# Patient Record
Sex: MALE | Race: WHITE | Employment: FULL TIME | URBAN - METROPOLITAN AREA
[De-identification: names, ages, dates, MRNs, and addresses within clinical notes are randomized per-mention and may not be internally consistent; named-entity substitution may affect disease eponyms.]

---

## 2018-05-28 ENCOUNTER — HOSPITAL ENCOUNTER (INPATIENT)
Age: 48
LOS: 4 days | Discharge: HOME | DRG: 310 | End: 2018-06-01
Attending: EMERGENCY MEDICINE | Admitting: FAMILY MEDICINE
Payer: COMMERCIAL

## 2018-05-28 ENCOUNTER — APPOINTMENT (OUTPATIENT)
Dept: GENERAL RADIOLOGY | Facility: CLINIC | Age: 48
DRG: 310 | End: 2018-05-28
Payer: COMMERCIAL

## 2018-05-28 DIAGNOSIS — R07.9 CHEST PAIN, UNSPECIFIED TYPE: ICD-10-CM

## 2018-05-28 DIAGNOSIS — I48.91 ATRIAL FIBRILLATION WITH RVR (HCC): Primary | ICD-10-CM

## 2018-05-28 LAB
ABSOLUTE EOS #: 0.1 K/UL (ref 0–0.4)
ABSOLUTE IMMATURE GRANULOCYTE: NORMAL K/UL (ref 0–0.3)
ABSOLUTE LYMPH #: 2.8 K/UL (ref 1–4.8)
ABSOLUTE MONO #: 0.8 K/UL (ref 0.1–1.2)
ANION GAP SERPL CALCULATED.3IONS-SCNC: 12 MMOL/L (ref 9–17)
BASOPHILS # BLD: 1 % (ref 0–2)
BASOPHILS ABSOLUTE: 0.1 K/UL (ref 0–0.2)
BUN BLDV-MCNC: 22 MG/DL (ref 6–20)
BUN/CREAT BLD: ABNORMAL (ref 9–20)
CALCIUM SERPL-MCNC: 9.5 MG/DL (ref 8.6–10.4)
CHLORIDE BLD-SCNC: 105 MMOL/L (ref 98–107)
CO2: 24 MMOL/L (ref 20–31)
CREAT SERPL-MCNC: 1.1 MG/DL (ref 0.7–1.2)
D-DIMER QUANTITATIVE: 0.19 MG/L FEU
DIFFERENTIAL TYPE: NORMAL
EOSINOPHILS RELATIVE PERCENT: 2 % (ref 1–4)
GFR AFRICAN AMERICAN: >60 ML/MIN
GFR NON-AFRICAN AMERICAN: >60 ML/MIN
GFR SERPL CREATININE-BSD FRML MDRD: ABNORMAL ML/MIN/{1.73_M2}
GFR SERPL CREATININE-BSD FRML MDRD: ABNORMAL ML/MIN/{1.73_M2}
GLUCOSE BLD-MCNC: 145 MG/DL (ref 70–99)
HCT VFR BLD CALC: 45.7 % (ref 41–53)
HEMOGLOBIN: 15.6 G/DL (ref 13.5–17.5)
IMMATURE GRANULOCYTES: NORMAL %
INR BLD: 1
LYMPHOCYTES # BLD: 31 % (ref 24–44)
MCH RBC QN AUTO: 30.7 PG (ref 26–34)
MCHC RBC AUTO-ENTMCNC: 34.1 G/DL (ref 31–37)
MCV RBC AUTO: 90.2 FL (ref 80–100)
MONOCYTES # BLD: 9 % (ref 2–11)
MYOGLOBIN: 64 NG/ML (ref 28–72)
NRBC AUTOMATED: NORMAL PER 100 WBC
PDW BLD-RTO: 13.2 % (ref 12.5–15.4)
PLATELET # BLD: 254 K/UL (ref 140–450)
PLATELET ESTIMATE: NORMAL
PMV BLD AUTO: 7.6 FL (ref 6–12)
POTASSIUM SERPL-SCNC: 4.2 MMOL/L (ref 3.7–5.3)
PROTHROMBIN TIME: 10.6 SEC (ref 9.4–12.6)
RBC # BLD: 5.06 M/UL (ref 4.5–5.9)
RBC # BLD: NORMAL 10*6/UL
SEG NEUTROPHILS: 57 % (ref 36–66)
SEGMENTED NEUTROPHILS ABSOLUTE COUNT: 5.2 K/UL (ref 1.8–7.7)
SODIUM BLD-SCNC: 141 MMOL/L (ref 135–144)
TROPONIN INTERP: NORMAL
TROPONIN T: <0.03 NG/ML
WBC # BLD: 9 K/UL (ref 3.5–11)
WBC # BLD: NORMAL 10*3/UL

## 2018-05-28 PROCEDURE — 84443 ASSAY THYROID STIM HORMONE: CPT

## 2018-05-28 PROCEDURE — 71045 X-RAY EXAM CHEST 1 VIEW: CPT

## 2018-05-28 PROCEDURE — 2500000003 HC RX 250 WO HCPCS: Performed by: EMERGENCY MEDICINE

## 2018-05-28 PROCEDURE — 2000000000 HC ICU R&B

## 2018-05-28 PROCEDURE — 93005 ELECTROCARDIOGRAM TRACING: CPT

## 2018-05-28 PROCEDURE — 96374 THER/PROPH/DIAG INJ IV PUSH: CPT

## 2018-05-28 PROCEDURE — 2580000003 HC RX 258: Performed by: EMERGENCY MEDICINE

## 2018-05-28 PROCEDURE — 6370000000 HC RX 637 (ALT 250 FOR IP): Performed by: EMERGENCY MEDICINE

## 2018-05-28 PROCEDURE — 83874 ASSAY OF MYOGLOBIN: CPT

## 2018-05-28 PROCEDURE — 85025 COMPLETE CBC W/AUTO DIFF WBC: CPT

## 2018-05-28 PROCEDURE — 96372 THER/PROPH/DIAG INJ SC/IM: CPT

## 2018-05-28 PROCEDURE — 6360000002 HC RX W HCPCS: Performed by: INTERNAL MEDICINE

## 2018-05-28 PROCEDURE — 6360000002 HC RX W HCPCS: Performed by: EMERGENCY MEDICINE

## 2018-05-28 PROCEDURE — 80048 BASIC METABOLIC PNL TOTAL CA: CPT

## 2018-05-28 PROCEDURE — 85610 PROTHROMBIN TIME: CPT

## 2018-05-28 PROCEDURE — 85379 FIBRIN DEGRADATION QUANT: CPT

## 2018-05-28 PROCEDURE — 99285 EMERGENCY DEPT VISIT HI MDM: CPT

## 2018-05-28 PROCEDURE — 36415 COLL VENOUS BLD VENIPUNCTURE: CPT

## 2018-05-28 PROCEDURE — 84484 ASSAY OF TROPONIN QUANT: CPT

## 2018-05-28 RX ORDER — ESMOLOL HYDROCHLORIDE 10 MG/ML
500 INJECTION INTRAVENOUS ONCE
Status: COMPLETED | OUTPATIENT
Start: 2018-05-28 | End: 2018-05-28

## 2018-05-28 RX ORDER — DILTIAZEM HYDROCHLORIDE 5 MG/ML
10 INJECTION INTRAVENOUS ONCE
Status: DISCONTINUED | OUTPATIENT
Start: 2018-05-28 | End: 2018-05-28

## 2018-05-28 RX ORDER — 0.9 % SODIUM CHLORIDE 0.9 %
1000 INTRAVENOUS SOLUTION INTRAVENOUS ONCE
Status: COMPLETED | OUTPATIENT
Start: 2018-05-28 | End: 2018-05-28

## 2018-05-28 RX ORDER — DILTIAZEM HYDROCHLORIDE 5 MG/ML
20 INJECTION INTRAVENOUS ONCE
Status: DISCONTINUED | OUTPATIENT
Start: 2018-05-28 | End: 2018-05-28

## 2018-05-28 RX ORDER — ROSUVASTATIN CALCIUM 10 MG/1
10 TABLET, COATED ORAL DAILY
COMMUNITY

## 2018-05-28 RX ORDER — ACETAMINOPHEN 325 MG/1
650 TABLET ORAL EVERY 4 HOURS PRN
Status: DISCONTINUED | OUTPATIENT
Start: 2018-05-28 | End: 2018-06-01 | Stop reason: HOSPADM

## 2018-05-28 RX ORDER — NIFEDIPINE 60 MG/1
60 TABLET, FILM COATED, EXTENDED RELEASE ORAL DAILY
Status: ON HOLD | COMMUNITY
End: 2018-06-01 | Stop reason: HOSPADM

## 2018-05-28 RX ORDER — ASPIRIN 81 MG/1
324 TABLET, CHEWABLE ORAL ONCE
Status: COMPLETED | OUTPATIENT
Start: 2018-05-28 | End: 2018-05-28

## 2018-05-28 RX ORDER — DIGOXIN 0.25 MG/ML
250 INJECTION INTRAMUSCULAR; INTRAVENOUS EVERY 8 HOURS
Status: COMPLETED | OUTPATIENT
Start: 2018-05-28 | End: 2018-05-28

## 2018-05-28 RX ORDER — SODIUM CHLORIDE 0.9 % (FLUSH) 0.9 %
10 SYRINGE (ML) INJECTION PRN
Status: DISCONTINUED | OUTPATIENT
Start: 2018-05-28 | End: 2018-06-01 | Stop reason: HOSPADM

## 2018-05-28 RX ORDER — METOPROLOL SUCCINATE 25 MG/1
25 TABLET, EXTENDED RELEASE ORAL DAILY
Status: ON HOLD | COMMUNITY
End: 2018-06-01 | Stop reason: HOSPADM

## 2018-05-28 RX ORDER — DIGOXIN 125 MCG
125 TABLET ORAL DAILY
Status: DISCONTINUED | OUTPATIENT
Start: 2018-05-29 | End: 2018-06-01 | Stop reason: HOSPADM

## 2018-05-28 RX ORDER — SODIUM CHLORIDE 0.9 % (FLUSH) 0.9 %
10 SYRINGE (ML) INJECTION EVERY 12 HOURS SCHEDULED
Status: DISCONTINUED | OUTPATIENT
Start: 2018-05-28 | End: 2018-06-01 | Stop reason: HOSPADM

## 2018-05-28 RX ORDER — ESMOLOL HYDROCHLORIDE 10 MG/ML
50 INJECTION, SOLUTION INTRAVENOUS CONTINUOUS
Status: DISCONTINUED | OUTPATIENT
Start: 2018-05-28 | End: 2018-05-29

## 2018-05-28 RX ORDER — DIGOXIN 0.25 MG/ML
250 INJECTION INTRAMUSCULAR; INTRAVENOUS EVERY 8 HOURS
Status: DISCONTINUED | OUTPATIENT
Start: 2018-05-28 | End: 2018-05-28

## 2018-05-28 RX ORDER — METOPROLOL TARTRATE 5 MG/5ML
5 INJECTION INTRAVENOUS EVERY 5 MIN PRN
Status: COMPLETED | OUTPATIENT
Start: 2018-05-28 | End: 2018-05-28

## 2018-05-28 RX ADMIN — ESMOLOL HYDROCHLORIDE 50 MCG/KG/MIN: 10 INJECTION INTRAVENOUS at 12:01

## 2018-05-28 RX ADMIN — METOPROLOL TARTRATE 5 MG: 5 INJECTION INTRAVENOUS at 09:00

## 2018-05-28 RX ADMIN — ESMOLOL HYDROCHLORIDE 150 MCG/KG/MIN: 10 INJECTION INTRAVENOUS at 21:57

## 2018-05-28 RX ADMIN — SODIUM CHLORIDE 100 ML: 9 INJECTION, SOLUTION INTRAVENOUS at 13:28

## 2018-05-28 RX ADMIN — ESMOLOL HYDROCHLORIDE 150 MCG/KG/MIN: 10 INJECTION INTRAVENOUS at 19:51

## 2018-05-28 RX ADMIN — ESMOLOL HYDROCHLORIDE 61 MG: 10 INJECTION INTRAVENOUS at 11:43

## 2018-05-28 RX ADMIN — SODIUM CHLORIDE 1000 ML: 9 INJECTION, SOLUTION INTRAVENOUS at 12:34

## 2018-05-28 RX ADMIN — SODIUM CHLORIDE 1000 ML: 9 INJECTION, SOLUTION INTRAVENOUS at 09:52

## 2018-05-28 RX ADMIN — ENOXAPARIN SODIUM 120 MG: 150 INJECTION SUBCUTANEOUS at 08:41

## 2018-05-28 RX ADMIN — DIGOXIN 250 MCG: 0.25 INJECTION INTRAMUSCULAR; INTRAVENOUS at 18:00

## 2018-05-28 RX ADMIN — ESMOLOL HYDROCHLORIDE 175 MCG/KG/MIN: 10 INJECTION INTRAVENOUS at 15:13

## 2018-05-28 RX ADMIN — METOPROLOL TARTRATE 5 MG: 5 INJECTION INTRAVENOUS at 09:11

## 2018-05-28 RX ADMIN — DIGOXIN 250 MCG: 0.25 INJECTION INTRAMUSCULAR; INTRAVENOUS at 23:25

## 2018-05-28 RX ADMIN — ASPIRIN 81 MG 324 MG: 81 TABLET ORAL at 08:40

## 2018-05-28 RX ADMIN — SODIUM CHLORIDE 1000 ML: 9 INJECTION, SOLUTION INTRAVENOUS at 11:47

## 2018-05-28 RX ADMIN — METOPROLOL TARTRATE 5 MG: 5 INJECTION INTRAVENOUS at 09:19

## 2018-05-28 ASSESSMENT — PAIN SCALES - GENERAL
PAINLEVEL_OUTOF10: 0
PAINLEVEL_OUTOF10: 3
PAINLEVEL_OUTOF10: 0
PAINLEVEL_OUTOF10: 0

## 2018-05-28 ASSESSMENT — ENCOUNTER SYMPTOMS
VOMITING: 0
ABDOMINAL PAIN: 0
DIARRHEA: 0
BACK PAIN: 0
SHORTNESS OF BREATH: 1
SORE THROAT: 0
TROUBLE SWALLOWING: 0
NAUSEA: 0
CONSTIPATION: 0
BLOOD IN STOOL: 0
WHEEZING: 0

## 2018-05-28 ASSESSMENT — PAIN DESCRIPTION - ORIENTATION: ORIENTATION: MID

## 2018-05-28 ASSESSMENT — PAIN DESCRIPTION - PAIN TYPE: TYPE: ACUTE PAIN

## 2018-05-28 ASSESSMENT — HEART SCORE: ECG: 1

## 2018-05-28 ASSESSMENT — PAIN DESCRIPTION - FREQUENCY: FREQUENCY: INTERMITTENT

## 2018-05-28 ASSESSMENT — PAIN DESCRIPTION - LOCATION: LOCATION: CHEST

## 2018-05-28 ASSESSMENT — PAIN DESCRIPTION - DESCRIPTORS: DESCRIPTORS: TIGHTNESS

## 2018-05-29 LAB
EKG ATRIAL RATE: 153 BPM
EKG ATRIAL RATE: 178 BPM
EKG Q-T INTERVAL: 274 MS
EKG Q-T INTERVAL: 320 MS
EKG QRS DURATION: 80 MS
EKG QRS DURATION: 80 MS
EKG QTC CALCULATION (BAZETT): 460 MS
EKG QTC CALCULATION (BAZETT): 495 MS
EKG R AXIS: 11 DEGREES
EKG R AXIS: 21 DEGREES
EKG T AXIS: -3 DEGREES
EKG T AXIS: 14 DEGREES
EKG VENTRICULAR RATE: 144 BPM
EKG VENTRICULAR RATE: 170 BPM
TSH SERPL DL<=0.05 MIU/L-ACNC: 1.65 MIU/L (ref 0.3–5)

## 2018-05-29 PROCEDURE — 2580000003 HC RX 258: Performed by: INTERNAL MEDICINE

## 2018-05-29 PROCEDURE — 6360000002 HC RX W HCPCS: Performed by: FAMILY MEDICINE

## 2018-05-29 PROCEDURE — 6360000002 HC RX W HCPCS: Performed by: INTERNAL MEDICINE

## 2018-05-29 PROCEDURE — 2500000003 HC RX 250 WO HCPCS: Performed by: INTERNAL MEDICINE

## 2018-05-29 PROCEDURE — 6370000000 HC RX 637 (ALT 250 FOR IP): Performed by: FAMILY MEDICINE

## 2018-05-29 PROCEDURE — 6370000000 HC RX 637 (ALT 250 FOR IP): Performed by: INTERNAL MEDICINE

## 2018-05-29 PROCEDURE — 2500000003 HC RX 250 WO HCPCS: Performed by: NURSE PRACTITIONER

## 2018-05-29 PROCEDURE — 6370000000 HC RX 637 (ALT 250 FOR IP): Performed by: NURSE PRACTITIONER

## 2018-05-29 PROCEDURE — 2000000000 HC ICU R&B

## 2018-05-29 RX ORDER — METOPROLOL TARTRATE 5 MG/5ML
5 INJECTION INTRAVENOUS EVERY 4 HOURS PRN
Status: DISCONTINUED | OUTPATIENT
Start: 2018-05-29 | End: 2018-05-29

## 2018-05-29 RX ORDER — LISINOPRIL 20 MG/1
20 TABLET ORAL DAILY
Status: DISCONTINUED | OUTPATIENT
Start: 2018-05-29 | End: 2018-06-01 | Stop reason: HOSPADM

## 2018-05-29 RX ORDER — METOPROLOL SUCCINATE 50 MG/1
50 TABLET, EXTENDED RELEASE ORAL DAILY
Status: DISCONTINUED | OUTPATIENT
Start: 2018-05-30 | End: 2018-05-30

## 2018-05-29 RX ORDER — METOPROLOL SUCCINATE 25 MG/1
25 TABLET, EXTENDED RELEASE ORAL DAILY
Status: DISCONTINUED | OUTPATIENT
Start: 2018-05-29 | End: 2018-05-29

## 2018-05-29 RX ORDER — METOPROLOL TARTRATE 5 MG/5ML
10 INJECTION INTRAVENOUS EVERY 4 HOURS PRN
Status: DISCONTINUED | OUTPATIENT
Start: 2018-05-29 | End: 2018-06-01 | Stop reason: HOSPADM

## 2018-05-29 RX ORDER — LORAZEPAM 0.5 MG/1
0.5 TABLET ORAL EVERY 6 HOURS PRN
Status: DISCONTINUED | OUTPATIENT
Start: 2018-05-29 | End: 2018-06-01 | Stop reason: HOSPADM

## 2018-05-29 RX ORDER — ROSUVASTATIN CALCIUM 10 MG/1
10 TABLET, COATED ORAL NIGHTLY
Status: DISCONTINUED | OUTPATIENT
Start: 2018-05-29 | End: 2018-06-01 | Stop reason: HOSPADM

## 2018-05-29 RX ORDER — ASPIRIN 81 MG/1
81 TABLET ORAL DAILY
Status: DISCONTINUED | OUTPATIENT
Start: 2018-05-29 | End: 2018-06-01 | Stop reason: HOSPADM

## 2018-05-29 RX ORDER — LISINOPRIL 40 MG/1
40 TABLET ORAL DAILY
Status: ON HOLD | COMMUNITY
End: 2019-06-07 | Stop reason: HOSPADM

## 2018-05-29 RX ORDER — DILTIAZEM HYDROCHLORIDE 240 MG/1
240 CAPSULE, COATED, EXTENDED RELEASE ORAL DAILY
Status: DISCONTINUED | OUTPATIENT
Start: 2018-05-29 | End: 2018-06-01 | Stop reason: HOSPADM

## 2018-05-29 RX ORDER — VERAPAMIL HYDROCHLORIDE 2.5 MG/ML
5 INJECTION, SOLUTION INTRAVENOUS ONCE
Status: COMPLETED | OUTPATIENT
Start: 2018-05-29 | End: 2018-05-29

## 2018-05-29 RX ADMIN — DIGOXIN 125 MCG: 125 TABLET ORAL at 08:26

## 2018-05-29 RX ADMIN — ACETAMINOPHEN 650 MG: 325 TABLET ORAL at 08:26

## 2018-05-29 RX ADMIN — METOROPROLOL TARTRATE 10 MG: 5 INJECTION, SOLUTION INTRAVENOUS at 05:11

## 2018-05-29 RX ADMIN — METOROPROLOL TARTRATE 10 MG: 5 INJECTION, SOLUTION INTRAVENOUS at 10:17

## 2018-05-29 RX ADMIN — ENOXAPARIN SODIUM 30 MG: 30 INJECTION SUBCUTANEOUS at 22:08

## 2018-05-29 RX ADMIN — AMIODARONE HYDROCHLORIDE 150 MG: 1.5 INJECTION, SOLUTION INTRAVENOUS at 02:08

## 2018-05-29 RX ADMIN — ENOXAPARIN SODIUM 30 MG: 30 INJECTION SUBCUTANEOUS at 09:16

## 2018-05-29 RX ADMIN — ASPIRIN 81 MG: 81 TABLET, COATED ORAL at 09:16

## 2018-05-29 RX ADMIN — METOPROLOL SUCCINATE 25 MG: 25 TABLET, FILM COATED, EXTENDED RELEASE ORAL at 09:16

## 2018-05-29 RX ADMIN — ACETAMINOPHEN 650 MG: 325 TABLET ORAL at 12:43

## 2018-05-29 RX ADMIN — ACETAMINOPHEN 650 MG: 325 TABLET ORAL at 18:59

## 2018-05-29 RX ADMIN — ROSUVASTATIN CALCIUM 10 MG: 10 TABLET, FILM COATED ORAL at 22:09

## 2018-05-29 RX ADMIN — AMIODARONE HYDROCHLORIDE 0.5 MG/MIN: 50 INJECTION, SOLUTION INTRAVENOUS at 19:14

## 2018-05-29 RX ADMIN — LISINOPRIL 20 MG: 20 TABLET ORAL at 09:16

## 2018-05-29 RX ADMIN — AMIODARONE HYDROCHLORIDE 1 MG/MIN: 1.8 INJECTION, SOLUTION INTRAVENOUS at 02:29

## 2018-05-29 RX ADMIN — DILTIAZEM HYDROCHLORIDE 240 MG: 240 CAPSULE, COATED, EXTENDED RELEASE ORAL at 14:31

## 2018-05-29 RX ADMIN — LORAZEPAM 0.5 MG: 0.5 TABLET ORAL at 18:55

## 2018-05-29 RX ADMIN — VERAPAMIL HYDROCHLORIDE 5 MG: 2.5 INJECTION, SOLUTION INTRAVENOUS at 14:32

## 2018-05-29 ASSESSMENT — PAIN SCALES - GENERAL
PAINLEVEL_OUTOF10: 2
PAINLEVEL_OUTOF10: 0
PAINLEVEL_OUTOF10: 2
PAINLEVEL_OUTOF10: 0
PAINLEVEL_OUTOF10: 1
PAINLEVEL_OUTOF10: 2

## 2018-05-29 ASSESSMENT — PAIN DESCRIPTION - PAIN TYPE
TYPE: ACUTE PAIN

## 2018-05-29 ASSESSMENT — PAIN DESCRIPTION - PROGRESSION: CLINICAL_PROGRESSION: GRADUALLY WORSENING

## 2018-05-29 ASSESSMENT — PAIN DESCRIPTION - DESCRIPTORS
DESCRIPTORS: ACHING

## 2018-05-29 ASSESSMENT — PAIN DESCRIPTION - ORIENTATION
ORIENTATION: OTHER (COMMENT)

## 2018-05-29 ASSESSMENT — PAIN DESCRIPTION - ONSET: ONSET: GRADUAL

## 2018-05-29 ASSESSMENT — PAIN DESCRIPTION - LOCATION
LOCATION: CHEST

## 2018-05-30 LAB
ANION GAP SERPL CALCULATED.3IONS-SCNC: 12 MMOL/L (ref 9–17)
BUN BLDV-MCNC: 12 MG/DL (ref 6–20)
BUN/CREAT BLD: 12 (ref 9–20)
CALCIUM SERPL-MCNC: 9.1 MG/DL (ref 8.6–10.4)
CHLORIDE BLD-SCNC: 102 MMOL/L (ref 98–107)
CO2: 25 MMOL/L (ref 20–31)
CREAT SERPL-MCNC: 1.02 MG/DL (ref 0.7–1.2)
GFR AFRICAN AMERICAN: >60 ML/MIN
GFR NON-AFRICAN AMERICAN: >60 ML/MIN
GFR SERPL CREATININE-BSD FRML MDRD: ABNORMAL ML/MIN/{1.73_M2}
GFR SERPL CREATININE-BSD FRML MDRD: ABNORMAL ML/MIN/{1.73_M2}
GLUCOSE BLD-MCNC: 118 MG/DL (ref 70–99)
LV EF: 53 %
LVEF MODALITY: NORMAL
MAGNESIUM: 2.1 MG/DL (ref 1.6–2.6)
POTASSIUM SERPL-SCNC: 3.9 MMOL/L (ref 3.7–5.3)
SODIUM BLD-SCNC: 139 MMOL/L (ref 135–144)

## 2018-05-30 PROCEDURE — 6370000000 HC RX 637 (ALT 250 FOR IP): Performed by: NURSE PRACTITIONER

## 2018-05-30 PROCEDURE — 2580000003 HC RX 258: Performed by: FAMILY MEDICINE

## 2018-05-30 PROCEDURE — 6370000000 HC RX 637 (ALT 250 FOR IP): Performed by: INTERNAL MEDICINE

## 2018-05-30 PROCEDURE — 2500000003 HC RX 250 WO HCPCS: Performed by: NURSE PRACTITIONER

## 2018-05-30 PROCEDURE — 2580000003 HC RX 258: Performed by: NURSE PRACTITIONER

## 2018-05-30 PROCEDURE — 2000000000 HC ICU R&B

## 2018-05-30 PROCEDURE — 6370000000 HC RX 637 (ALT 250 FOR IP): Performed by: FAMILY MEDICINE

## 2018-05-30 PROCEDURE — 6360000002 HC RX W HCPCS: Performed by: FAMILY MEDICINE

## 2018-05-30 PROCEDURE — 6360000002 HC RX W HCPCS: Performed by: INTERNAL MEDICINE

## 2018-05-30 PROCEDURE — 80048 BASIC METABOLIC PNL TOTAL CA: CPT

## 2018-05-30 PROCEDURE — 93306 TTE W/DOPPLER COMPLETE: CPT

## 2018-05-30 PROCEDURE — 83735 ASSAY OF MAGNESIUM: CPT

## 2018-05-30 PROCEDURE — 2580000003 HC RX 258: Performed by: INTERNAL MEDICINE

## 2018-05-30 PROCEDURE — 2500000003 HC RX 250 WO HCPCS: Performed by: INTERNAL MEDICINE

## 2018-05-30 PROCEDURE — 36415 COLL VENOUS BLD VENIPUNCTURE: CPT

## 2018-05-30 RX ORDER — METOPROLOL SUCCINATE 50 MG/1
100 TABLET, EXTENDED RELEASE ORAL DAILY
Status: DISCONTINUED | OUTPATIENT
Start: 2018-05-31 | End: 2018-06-01 | Stop reason: HOSPADM

## 2018-05-30 RX ORDER — METOPROLOL SUCCINATE 50 MG/1
50 TABLET, EXTENDED RELEASE ORAL ONCE
Status: COMPLETED | OUTPATIENT
Start: 2018-05-30 | End: 2018-05-30

## 2018-05-30 RX ADMIN — ROSUVASTATIN CALCIUM 10 MG: 10 TABLET, FILM COATED ORAL at 20:59

## 2018-05-30 RX ADMIN — DILTIAZEM HYDROCHLORIDE 240 MG: 240 CAPSULE, COATED, EXTENDED RELEASE ORAL at 09:41

## 2018-05-30 RX ADMIN — VERAPAMIL HYDROCHLORIDE 5 MG/HR: 2.5 INJECTION, SOLUTION INTRAVENOUS at 22:32

## 2018-05-30 RX ADMIN — METOROPROLOL TARTRATE 10 MG: 5 INJECTION, SOLUTION INTRAVENOUS at 01:19

## 2018-05-30 RX ADMIN — VERAPAMIL HYDROCHLORIDE 5 MG/HR: 2.5 INJECTION, SOLUTION INTRAVENOUS at 12:37

## 2018-05-30 RX ADMIN — SODIUM CHLORIDE, PRESERVATIVE FREE 10 ML: 5 INJECTION INTRAVENOUS at 22:16

## 2018-05-30 RX ADMIN — LORAZEPAM 0.5 MG: 0.5 TABLET ORAL at 18:58

## 2018-05-30 RX ADMIN — LORAZEPAM 0.5 MG: 0.5 TABLET ORAL at 01:19

## 2018-05-30 RX ADMIN — AMIODARONE HYDROCHLORIDE 0.5 MG/MIN: 50 INJECTION, SOLUTION INTRAVENOUS at 11:13

## 2018-05-30 RX ADMIN — METOPROLOL SUCCINATE 50 MG: 50 TABLET, FILM COATED, EXTENDED RELEASE ORAL at 09:41

## 2018-05-30 RX ADMIN — ENOXAPARIN SODIUM 30 MG: 30 INJECTION SUBCUTANEOUS at 20:59

## 2018-05-30 RX ADMIN — ACETAMINOPHEN 650 MG: 325 TABLET ORAL at 03:57

## 2018-05-30 RX ADMIN — ACETAMINOPHEN 650 MG: 325 TABLET ORAL at 17:37

## 2018-05-30 RX ADMIN — DIGOXIN 125 MCG: 125 TABLET ORAL at 09:41

## 2018-05-30 RX ADMIN — ENOXAPARIN SODIUM 30 MG: 30 INJECTION SUBCUTANEOUS at 09:40

## 2018-05-30 RX ADMIN — METOPROLOL SUCCINATE 50 MG: 50 TABLET, FILM COATED, EXTENDED RELEASE ORAL at 15:15

## 2018-05-30 RX ADMIN — METOROPROLOL TARTRATE 10 MG: 5 INJECTION, SOLUTION INTRAVENOUS at 11:13

## 2018-05-30 RX ADMIN — ASPIRIN 81 MG: 81 TABLET, COATED ORAL at 09:41

## 2018-05-30 RX ADMIN — LISINOPRIL 20 MG: 20 TABLET ORAL at 09:41

## 2018-05-30 ASSESSMENT — PAIN SCALES - GENERAL
PAINLEVEL_OUTOF10: 0
PAINLEVEL_OUTOF10: 2
PAINLEVEL_OUTOF10: 0
PAINLEVEL_OUTOF10: 3
PAINLEVEL_OUTOF10: 0

## 2018-05-30 ASSESSMENT — PAIN DESCRIPTION - FREQUENCY
FREQUENCY: INTERMITTENT
FREQUENCY: CONTINUOUS

## 2018-05-30 ASSESSMENT — PAIN DESCRIPTION - LOCATION
LOCATION: HEAD
LOCATION: HEAD

## 2018-05-30 ASSESSMENT — PAIN DESCRIPTION - DESCRIPTORS
DESCRIPTORS: HEADACHE
DESCRIPTORS: ACHING

## 2018-05-30 ASSESSMENT — PAIN DESCRIPTION - ONSET
ONSET: GRADUAL
ONSET: SUDDEN

## 2018-05-30 ASSESSMENT — PAIN DESCRIPTION - PAIN TYPE
TYPE: ACUTE PAIN
TYPE: ACUTE PAIN

## 2018-05-31 PROCEDURE — 2500000003 HC RX 250 WO HCPCS: Performed by: NURSE PRACTITIONER

## 2018-05-31 PROCEDURE — 6370000000 HC RX 637 (ALT 250 FOR IP): Performed by: FAMILY MEDICINE

## 2018-05-31 PROCEDURE — 6360000002 HC RX W HCPCS: Performed by: INTERNAL MEDICINE

## 2018-05-31 PROCEDURE — 6360000002 HC RX W HCPCS: Performed by: FAMILY MEDICINE

## 2018-05-31 PROCEDURE — 6370000000 HC RX 637 (ALT 250 FOR IP): Performed by: NURSE PRACTITIONER

## 2018-05-31 PROCEDURE — 6370000000 HC RX 637 (ALT 250 FOR IP): Performed by: INTERNAL MEDICINE

## 2018-05-31 PROCEDURE — 2000000000 HC ICU R&B

## 2018-05-31 PROCEDURE — 2580000003 HC RX 258: Performed by: NURSE PRACTITIONER

## 2018-05-31 PROCEDURE — 2580000003 HC RX 258: Performed by: FAMILY MEDICINE

## 2018-05-31 RX ADMIN — DILTIAZEM HYDROCHLORIDE 240 MG: 240 CAPSULE, COATED, EXTENDED RELEASE ORAL at 08:39

## 2018-05-31 RX ADMIN — VERAPAMIL HYDROCHLORIDE 5 MG/HR: 2.5 INJECTION, SOLUTION INTRAVENOUS at 09:33

## 2018-05-31 RX ADMIN — ENOXAPARIN SODIUM 30 MG: 30 INJECTION SUBCUTANEOUS at 20:56

## 2018-05-31 RX ADMIN — RIVAROXABAN 20 MG: 20 TABLET, FILM COATED ORAL at 19:49

## 2018-05-31 RX ADMIN — ENOXAPARIN SODIUM 30 MG: 30 INJECTION SUBCUTANEOUS at 08:46

## 2018-05-31 RX ADMIN — ROSUVASTATIN CALCIUM 10 MG: 10 TABLET, FILM COATED ORAL at 20:56

## 2018-05-31 RX ADMIN — DIGOXIN 125 MCG: 125 TABLET ORAL at 08:39

## 2018-05-31 RX ADMIN — ASPIRIN 81 MG: 81 TABLET, COATED ORAL at 08:39

## 2018-05-31 RX ADMIN — METOPROLOL SUCCINATE 100 MG: 50 TABLET, FILM COATED, EXTENDED RELEASE ORAL at 08:39

## 2018-05-31 RX ADMIN — LORAZEPAM 0.5 MG: 0.5 TABLET ORAL at 20:56

## 2018-05-31 RX ADMIN — LISINOPRIL 20 MG: 20 TABLET ORAL at 08:39

## 2018-05-31 RX ADMIN — SODIUM CHLORIDE, PRESERVATIVE FREE 10 ML: 5 INJECTION INTRAVENOUS at 21:01

## 2018-05-31 ASSESSMENT — PAIN SCALES - GENERAL
PAINLEVEL_OUTOF10: 0

## 2018-06-01 ENCOUNTER — ANESTHESIA EVENT (OUTPATIENT)
Dept: CARDIAC CATH/INVASIVE PROCEDURES | Age: 48
DRG: 310 | End: 2018-06-01
Payer: COMMERCIAL

## 2018-06-01 ENCOUNTER — ANESTHESIA (OUTPATIENT)
Dept: CARDIAC CATH/INVASIVE PROCEDURES | Age: 48
DRG: 310 | End: 2018-06-01
Payer: COMMERCIAL

## 2018-06-01 ENCOUNTER — APPOINTMENT (OUTPATIENT)
Dept: CARDIAC CATH/INVASIVE PROCEDURES | Age: 48
DRG: 310 | End: 2018-06-01
Payer: COMMERCIAL

## 2018-06-01 VITALS
SYSTOLIC BLOOD PRESSURE: 110 MMHG | DIASTOLIC BLOOD PRESSURE: 74 MMHG | RESPIRATION RATE: 19 BRPM | OXYGEN SATURATION: 94 %

## 2018-06-01 VITALS
BODY MASS INDEX: 32.38 KG/M2 | RESPIRATION RATE: 18 BRPM | SYSTOLIC BLOOD PRESSURE: 130 MMHG | OXYGEN SATURATION: 95 % | DIASTOLIC BLOOD PRESSURE: 97 MMHG | WEIGHT: 265.9 LBS | TEMPERATURE: 97.9 F | HEART RATE: 78 BPM | HEIGHT: 76 IN

## 2018-06-01 LAB
ANION GAP SERPL CALCULATED.3IONS-SCNC: 11 MMOL/L (ref 9–17)
BUN BLDV-MCNC: 15 MG/DL (ref 6–20)
BUN/CREAT BLD: 14 (ref 9–20)
CALCIUM SERPL-MCNC: 9 MG/DL (ref 8.6–10.4)
CHLORIDE BLD-SCNC: 104 MMOL/L (ref 98–107)
CO2: 26 MMOL/L (ref 20–31)
CREAT SERPL-MCNC: 1.07 MG/DL (ref 0.7–1.2)
GFR AFRICAN AMERICAN: >60 ML/MIN
GFR NON-AFRICAN AMERICAN: >60 ML/MIN
GFR SERPL CREATININE-BSD FRML MDRD: ABNORMAL ML/MIN/{1.73_M2}
GFR SERPL CREATININE-BSD FRML MDRD: ABNORMAL ML/MIN/{1.73_M2}
GLUCOSE BLD-MCNC: 102 MG/DL (ref 70–99)
MAGNESIUM: 2.3 MG/DL (ref 1.6–2.6)
POTASSIUM SERPL-SCNC: 3.9 MMOL/L (ref 3.7–5.3)
SODIUM BLD-SCNC: 141 MMOL/L (ref 135–144)

## 2018-06-01 PROCEDURE — 6370000000 HC RX 637 (ALT 250 FOR IP): Performed by: INTERNAL MEDICINE

## 2018-06-01 PROCEDURE — 94770 HC ETCO2 MONITOR DAILY: CPT

## 2018-06-01 PROCEDURE — 2580000003 HC RX 258: Performed by: NURSE ANESTHETIST, CERTIFIED REGISTERED

## 2018-06-01 PROCEDURE — 83735 ASSAY OF MAGNESIUM: CPT

## 2018-06-01 PROCEDURE — 80048 BASIC METABOLIC PNL TOTAL CA: CPT

## 2018-06-01 PROCEDURE — 36415 COLL VENOUS BLD VENIPUNCTURE: CPT

## 2018-06-01 PROCEDURE — 5A2204Z RESTORATION OF CARDIAC RHYTHM, SINGLE: ICD-10-PCS | Performed by: INTERNAL MEDICINE

## 2018-06-01 PROCEDURE — 6370000000 HC RX 637 (ALT 250 FOR IP): Performed by: FAMILY MEDICINE

## 2018-06-01 PROCEDURE — 2700000000 HC OXYGEN THERAPY PER DAY

## 2018-06-01 PROCEDURE — 93312 ECHO TRANSESOPHAGEAL: CPT

## 2018-06-01 PROCEDURE — 6360000002 HC RX W HCPCS: Performed by: NURSE ANESTHETIST, CERTIFIED REGISTERED

## 2018-06-01 PROCEDURE — 6370000000 HC RX 637 (ALT 250 FOR IP): Performed by: NURSE PRACTITIONER

## 2018-06-01 PROCEDURE — 3700000000 HC ANESTHESIA ATTENDED CARE

## 2018-06-01 PROCEDURE — 2500000003 HC RX 250 WO HCPCS: Performed by: NURSE ANESTHETIST, CERTIFIED REGISTERED

## 2018-06-01 PROCEDURE — 3700000001 HC ADD 15 MINUTES (ANESTHESIA)

## 2018-06-01 RX ORDER — PROPOFOL 10 MG/ML
INJECTION, EMULSION INTRAVENOUS PRN
Status: DISCONTINUED | OUTPATIENT
Start: 2018-06-01 | End: 2018-06-01 | Stop reason: SDUPTHER

## 2018-06-01 RX ORDER — DILTIAZEM HYDROCHLORIDE 240 MG/1
240 CAPSULE, COATED, EXTENDED RELEASE ORAL DAILY
Qty: 30 CAPSULE | Refills: 3 | Status: SHIPPED | OUTPATIENT
Start: 2018-06-02 | End: 2019-01-08 | Stop reason: ALTCHOICE

## 2018-06-01 RX ORDER — SODIUM CHLORIDE 9 MG/ML
INJECTION, SOLUTION INTRAVENOUS CONTINUOUS PRN
Status: DISCONTINUED | OUTPATIENT
Start: 2018-06-01 | End: 2018-06-01 | Stop reason: SDUPTHER

## 2018-06-01 RX ORDER — METOPROLOL SUCCINATE 100 MG/1
100 TABLET, EXTENDED RELEASE ORAL DAILY
Qty: 30 TABLET | Refills: 3 | Status: ON HOLD | OUTPATIENT
Start: 2018-06-02 | End: 2019-06-05 | Stop reason: SDUPTHER

## 2018-06-01 RX ORDER — LIDOCAINE HYDROCHLORIDE 20 MG/ML
INJECTION, SOLUTION INFILTRATION; PERINEURAL PRN
Status: DISCONTINUED | OUTPATIENT
Start: 2018-06-01 | End: 2018-06-01 | Stop reason: SDUPTHER

## 2018-06-01 RX ORDER — FENTANYL CITRATE 50 UG/ML
INJECTION, SOLUTION INTRAMUSCULAR; INTRAVENOUS PRN
Status: DISCONTINUED | OUTPATIENT
Start: 2018-06-01 | End: 2018-06-01 | Stop reason: SDUPTHER

## 2018-06-01 RX ORDER — DIGOXIN 125 MCG
125 TABLET ORAL DAILY
Qty: 30 TABLET | Refills: 3 | Status: SHIPPED | OUTPATIENT
Start: 2018-06-02 | End: 2019-06-05

## 2018-06-01 RX ADMIN — PROPOFOL 60 MG: 10 INJECTION, EMULSION INTRAVENOUS at 14:24

## 2018-06-01 RX ADMIN — PROPOFOL 40 MG: 10 INJECTION, EMULSION INTRAVENOUS at 14:18

## 2018-06-01 RX ADMIN — LIDOCAINE HYDROCHLORIDE 50 MG: 20 INJECTION, SOLUTION INFILTRATION; PERINEURAL at 14:15

## 2018-06-01 RX ADMIN — PROPOFOL 40 MG: 10 INJECTION, EMULSION INTRAVENOUS at 14:15

## 2018-06-01 RX ADMIN — SODIUM CHLORIDE: 9 INJECTION, SOLUTION INTRAVENOUS at 14:05

## 2018-06-01 RX ADMIN — PROPOFOL 40 MG: 10 INJECTION, EMULSION INTRAVENOUS at 14:21

## 2018-06-01 RX ADMIN — METOPROLOL SUCCINATE 100 MG: 50 TABLET, FILM COATED, EXTENDED RELEASE ORAL at 08:01

## 2018-06-01 RX ADMIN — LISINOPRIL 20 MG: 20 TABLET ORAL at 08:01

## 2018-06-01 RX ADMIN — DILTIAZEM HYDROCHLORIDE 240 MG: 240 CAPSULE, COATED, EXTENDED RELEASE ORAL at 08:01

## 2018-06-01 RX ADMIN — DIGOXIN 125 MCG: 125 TABLET ORAL at 08:01

## 2018-06-01 RX ADMIN — FENTANYL CITRATE 100 MCG: 50 INJECTION, SOLUTION INTRAMUSCULAR; INTRAVENOUS at 14:12

## 2018-06-01 RX ADMIN — ASPIRIN 81 MG: 81 TABLET, COATED ORAL at 08:01

## 2018-12-16 ENCOUNTER — NURSE TRIAGE (OUTPATIENT)
Dept: OTHER | Facility: CLINIC | Age: 48
End: 2018-12-16

## 2018-12-16 ENCOUNTER — APPOINTMENT (OUTPATIENT)
Dept: GENERAL RADIOLOGY | Facility: CLINIC | Age: 48
End: 2018-12-16
Payer: COMMERCIAL

## 2018-12-16 ENCOUNTER — HOSPITAL ENCOUNTER (EMERGENCY)
Facility: CLINIC | Age: 48
Discharge: HOME | End: 2018-12-16
Attending: EMERGENCY MEDICINE
Payer: COMMERCIAL

## 2018-12-16 ENCOUNTER — APPOINTMENT (OUTPATIENT)
Dept: CT IMAGING | Facility: CLINIC | Age: 48
End: 2018-12-16
Payer: COMMERCIAL

## 2018-12-16 VITALS
HEART RATE: 80 BPM | DIASTOLIC BLOOD PRESSURE: 91 MMHG | SYSTOLIC BLOOD PRESSURE: 151 MMHG | OXYGEN SATURATION: 97 % | TEMPERATURE: 98.1 F | RESPIRATION RATE: 16 BRPM

## 2018-12-16 DIAGNOSIS — I10 HYPERTENSION, UNSPECIFIED TYPE: Primary | ICD-10-CM

## 2018-12-16 LAB
-: ABNORMAL
ABSOLUTE EOS #: 0 K/UL (ref 0–0.4)
ABSOLUTE IMMATURE GRANULOCYTE: ABNORMAL K/UL (ref 0–0.3)
ABSOLUTE LYMPH #: 2.1 K/UL (ref 1–4.8)
ABSOLUTE MONO #: 0.7 K/UL (ref 0.1–1.2)
AMORPHOUS: ABNORMAL
ANION GAP SERPL CALCULATED.3IONS-SCNC: 13 MMOL/L (ref 9–17)
BACTERIA: ABNORMAL
BASOPHILS # BLD: 0 % (ref 0–2)
BASOPHILS ABSOLUTE: 0 K/UL (ref 0–0.2)
BILIRUBIN URINE: NEGATIVE
BUN BLDV-MCNC: 16 MG/DL (ref 6–20)
BUN/CREAT BLD: ABNORMAL (ref 9–20)
CALCIUM SERPL-MCNC: 9.9 MG/DL (ref 8.6–10.4)
CASTS UA: ABNORMAL /LPF (ref 0–2)
CHLORIDE BLD-SCNC: 104 MMOL/L (ref 98–107)
CO2: 23 MMOL/L (ref 20–31)
COLOR: YELLOW
COMMENT UA: ABNORMAL
CREAT SERPL-MCNC: 1 MG/DL (ref 0.7–1.2)
CRYSTALS, UA: ABNORMAL /HPF
DIFFERENTIAL TYPE: ABNORMAL
EKG ATRIAL RATE: 100 BPM
EKG P AXIS: 31 DEGREES
EKG P-R INTERVAL: 208 MS
EKG Q-T INTERVAL: 368 MS
EKG QRS DURATION: 84 MS
EKG QTC CALCULATION (BAZETT): 474 MS
EKG R AXIS: 10 DEGREES
EKG T AXIS: 3 DEGREES
EKG VENTRICULAR RATE: 100 BPM
EOSINOPHILS RELATIVE PERCENT: 1 % (ref 1–4)
EPITHELIAL CELLS UA: ABNORMAL /HPF (ref 0–5)
GFR AFRICAN AMERICAN: >60 ML/MIN
GFR NON-AFRICAN AMERICAN: >60 ML/MIN
GFR SERPL CREATININE-BSD FRML MDRD: ABNORMAL ML/MIN/{1.73_M2}
GFR SERPL CREATININE-BSD FRML MDRD: ABNORMAL ML/MIN/{1.73_M2}
GLUCOSE BLD-MCNC: 121 MG/DL (ref 70–99)
GLUCOSE URINE: NEGATIVE
HCT VFR BLD CALC: 47.5 % (ref 41–53)
HEMOGLOBIN: 16.5 G/DL (ref 13.5–17.5)
IMMATURE GRANULOCYTES: ABNORMAL %
KETONES, URINE: NEGATIVE
LEUKOCYTE ESTERASE, URINE: NEGATIVE
LYMPHOCYTES # BLD: 21 % (ref 24–44)
MCH RBC QN AUTO: 30.6 PG (ref 26–34)
MCHC RBC AUTO-ENTMCNC: 34.7 G/DL (ref 31–37)
MCV RBC AUTO: 88.1 FL (ref 80–100)
MONOCYTES # BLD: 7 % (ref 2–11)
MUCUS: ABNORMAL
NITRITE, URINE: NEGATIVE
NRBC AUTOMATED: ABNORMAL PER 100 WBC
OTHER OBSERVATIONS UA: ABNORMAL
PDW BLD-RTO: 12.6 % (ref 12.5–15.4)
PH UA: 6 (ref 5–8)
PLATELET # BLD: 238 K/UL (ref 140–450)
PLATELET ESTIMATE: ABNORMAL
PMV BLD AUTO: 7.1 FL (ref 6–12)
POTASSIUM SERPL-SCNC: 3.8 MMOL/L (ref 3.7–5.3)
PROTEIN UA: NEGATIVE
RBC # BLD: 5.39 M/UL (ref 4.5–5.9)
RBC # BLD: ABNORMAL 10*6/UL
RBC UA: ABNORMAL /HPF (ref 0–2)
RENAL EPITHELIAL, UA: ABNORMAL /HPF
SEG NEUTROPHILS: 71 % (ref 36–66)
SEGMENTED NEUTROPHILS ABSOLUTE COUNT: 7.3 K/UL (ref 1.8–7.7)
SODIUM BLD-SCNC: 140 MMOL/L (ref 135–144)
SPECIFIC GRAVITY UA: 1.01 (ref 1–1.03)
TRICHOMONAS: ABNORMAL
TROPONIN INTERP: NORMAL
TROPONIN T: <0.03 NG/ML
TURBIDITY: CLEAR
URINE HGB: ABNORMAL
UROBILINOGEN, URINE: NORMAL
WBC # BLD: 10.2 K/UL (ref 3.5–11)
WBC # BLD: ABNORMAL 10*3/UL
WBC UA: ABNORMAL /HPF (ref 0–5)
YEAST: ABNORMAL

## 2018-12-16 PROCEDURE — 70450 CT HEAD/BRAIN W/O DYE: CPT

## 2018-12-16 PROCEDURE — 99285 EMERGENCY DEPT VISIT HI MDM: CPT

## 2018-12-16 PROCEDURE — 85025 COMPLETE CBC W/AUTO DIFF WBC: CPT

## 2018-12-16 PROCEDURE — 81001 URINALYSIS AUTO W/SCOPE: CPT

## 2018-12-16 PROCEDURE — 36415 COLL VENOUS BLD VENIPUNCTURE: CPT

## 2018-12-16 PROCEDURE — 71045 X-RAY EXAM CHEST 1 VIEW: CPT

## 2018-12-16 PROCEDURE — 93005 ELECTROCARDIOGRAM TRACING: CPT

## 2018-12-16 PROCEDURE — 80048 BASIC METABOLIC PNL TOTAL CA: CPT

## 2018-12-16 PROCEDURE — 84484 ASSAY OF TROPONIN QUANT: CPT

## 2018-12-16 NOTE — TELEPHONE ENCOUNTER
Reason for Disposition   [7] Systolic BP  >= 093 OR Diastolic >= 120 AND [8] cardiac or neurologic symptoms (e.g., chest pain, difficulty breathing, unsteady gait, blurred vision)    Protocols used: HIGH BLOOD PRESSURE-ADULT-AH    Pt is experiencing Vertigo since upon waking this AM. Wife who is a RN took BP and it is 205/130. Wife will take pt to ER for further evaluation.

## 2018-12-16 NOTE — ED NOTES
Pt instructed on clean catch urine collection. Pt ambulates to bathroom with steady gait.       Sherman Melchor RN  12/16/18 5058

## 2019-06-05 ENCOUNTER — HOSPITAL ENCOUNTER (INPATIENT)
Age: 49
LOS: 2 days | Discharge: HOME | DRG: 310 | End: 2019-06-07
Attending: EMERGENCY MEDICINE | Admitting: INTERNAL MEDICINE
Payer: COMMERCIAL

## 2019-06-05 ENCOUNTER — APPOINTMENT (OUTPATIENT)
Dept: GENERAL RADIOLOGY | Age: 49
DRG: 310 | End: 2019-06-05
Payer: COMMERCIAL

## 2019-06-05 DIAGNOSIS — I48.91 ATRIAL FIBRILLATION WITH RAPID VENTRICULAR RESPONSE (HCC): Primary | ICD-10-CM

## 2019-06-05 PROBLEM — I95.2 HYPOTENSION DUE TO DRUGS: Status: ACTIVE | Noted: 2019-06-05

## 2019-06-05 PROBLEM — I10 ESSENTIAL HYPERTENSION: Status: ACTIVE | Noted: 2019-06-05

## 2019-06-05 LAB
ABSOLUTE EOS #: 0.11 K/UL (ref 0–0.44)
ABSOLUTE IMMATURE GRANULOCYTE: 0.04 K/UL (ref 0–0.3)
ABSOLUTE LYMPH #: 2.07 K/UL (ref 1.1–3.7)
ABSOLUTE MONO #: 0.82 K/UL (ref 0.1–1.2)
ALBUMIN SERPL-MCNC: 4.5 G/DL (ref 3.5–5.2)
ALBUMIN/GLOBULIN RATIO: ABNORMAL (ref 1–2.5)
ALP BLD-CCNC: 56 U/L (ref 40–129)
ALT SERPL-CCNC: 51 U/L (ref 5–41)
ANION GAP SERPL CALCULATED.3IONS-SCNC: 12 MMOL/L (ref 9–17)
AST SERPL-CCNC: 25 U/L
BASOPHILS # BLD: 1 % (ref 0–2)
BASOPHILS ABSOLUTE: 0.04 K/UL (ref 0–0.2)
BILIRUB SERPL-MCNC: 0.67 MG/DL (ref 0.3–1.2)
BUN BLDV-MCNC: 19 MG/DL (ref 6–20)
BUN/CREAT BLD: 16 (ref 9–20)
CALCIUM SERPL-MCNC: 9.7 MG/DL (ref 8.6–10.4)
CHLORIDE BLD-SCNC: 104 MMOL/L (ref 98–107)
CO2: 25 MMOL/L (ref 20–31)
CREAT SERPL-MCNC: 1.2 MG/DL (ref 0.7–1.2)
DIFFERENTIAL TYPE: ABNORMAL
DIGOXIN DATE LAST DOSE: ABNORMAL
DIGOXIN DOSE AMOUNT: ABNORMAL
DIGOXIN DOSE TIME: ABNORMAL
DIGOXIN LEVEL: <0.3 NG/ML (ref 0.5–2)
EKG ATRIAL RATE: 159 BPM
EKG Q-T INTERVAL: 280 MS
EKG QRS DURATION: 82 MS
EKG QTC CALCULATION (BAZETT): 433 MS
EKG R AXIS: 44 DEGREES
EKG T AXIS: -51 DEGREES
EKG VENTRICULAR RATE: 144 BPM
EOSINOPHILS RELATIVE PERCENT: 1 % (ref 1–4)
GFR AFRICAN AMERICAN: >60 ML/MIN
GFR NON-AFRICAN AMERICAN: >60 ML/MIN
GFR SERPL CREATININE-BSD FRML MDRD: ABNORMAL ML/MIN/{1.73_M2}
GFR SERPL CREATININE-BSD FRML MDRD: ABNORMAL ML/MIN/{1.73_M2}
GLUCOSE BLD-MCNC: 121 MG/DL (ref 70–99)
HCT VFR BLD CALC: 44.2 % (ref 40.7–50.3)
HEMOGLOBIN: 15 G/DL (ref 13–17)
IMMATURE GRANULOCYTES: 1 %
INR BLD: 1.1
LV EF: 45 %
LVEF MODALITY: NORMAL
LYMPHOCYTES # BLD: 24 % (ref 24–43)
MAGNESIUM: 2.2 MG/DL (ref 1.6–2.6)
MCH RBC QN AUTO: 30.7 PG (ref 25.2–33.5)
MCHC RBC AUTO-ENTMCNC: 33.9 G/DL (ref 28.4–34.8)
MCV RBC AUTO: 90.4 FL (ref 82.6–102.9)
MONOCYTES # BLD: 10 % (ref 3–12)
NRBC AUTOMATED: 0 PER 100 WBC
PARTIAL THROMBOPLASTIN TIME: 25.9 SEC (ref 23–31)
PDW BLD-RTO: 13 % (ref 11.8–14.4)
PLATELET # BLD: 272 K/UL (ref 138–453)
PLATELET ESTIMATE: ABNORMAL
PMV BLD AUTO: 9.2 FL (ref 8.1–13.5)
POTASSIUM SERPL-SCNC: 4.2 MMOL/L (ref 3.7–5.3)
PROTHROMBIN TIME: 11.3 SEC (ref 9.7–11.6)
RBC # BLD: 4.89 M/UL (ref 4.21–5.77)
RBC # BLD: ABNORMAL 10*6/UL
SEG NEUTROPHILS: 63 % (ref 36–65)
SEGMENTED NEUTROPHILS ABSOLUTE COUNT: 5.48 K/UL (ref 1.5–8.1)
SODIUM BLD-SCNC: 141 MMOL/L (ref 135–144)
TOTAL PROTEIN: 6.8 G/DL (ref 6.4–8.3)
TROPONIN INTERP: NORMAL
TROPONIN T: NORMAL NG/ML
TROPONIN, HIGH SENSITIVITY: 9 NG/L (ref 0–22)
TSH SERPL DL<=0.05 MIU/L-ACNC: 1.84 MIU/L (ref 0.3–5)
WBC # BLD: 8.6 K/UL (ref 3.5–11.3)
WBC # BLD: ABNORMAL 10*3/UL

## 2019-06-05 PROCEDURE — 85610 PROTHROMBIN TIME: CPT

## 2019-06-05 PROCEDURE — 6370000000 HC RX 637 (ALT 250 FOR IP): Performed by: INTERNAL MEDICINE

## 2019-06-05 PROCEDURE — 85025 COMPLETE CBC W/AUTO DIFF WBC: CPT

## 2019-06-05 PROCEDURE — 2580000003 HC RX 258: Performed by: EMERGENCY MEDICINE

## 2019-06-05 PROCEDURE — 71045 X-RAY EXAM CHEST 1 VIEW: CPT

## 2019-06-05 PROCEDURE — 99223 1ST HOSP IP/OBS HIGH 75: CPT | Performed by: INTERNAL MEDICINE

## 2019-06-05 PROCEDURE — 2000000000 HC ICU R&B

## 2019-06-05 PROCEDURE — 6370000000 HC RX 637 (ALT 250 FOR IP): Performed by: NURSE PRACTITIONER

## 2019-06-05 PROCEDURE — 2500000003 HC RX 250 WO HCPCS: Performed by: EMERGENCY MEDICINE

## 2019-06-05 PROCEDURE — 93005 ELECTROCARDIOGRAM TRACING: CPT | Performed by: EMERGENCY MEDICINE

## 2019-06-05 PROCEDURE — 85730 THROMBOPLASTIN TIME PARTIAL: CPT

## 2019-06-05 PROCEDURE — 80162 ASSAY OF DIGOXIN TOTAL: CPT

## 2019-06-05 PROCEDURE — 96365 THER/PROPH/DIAG IV INF INIT: CPT

## 2019-06-05 PROCEDURE — 80053 COMPREHEN METABOLIC PANEL: CPT

## 2019-06-05 PROCEDURE — 93306 TTE W/DOPPLER COMPLETE: CPT

## 2019-06-05 PROCEDURE — 84484 ASSAY OF TROPONIN QUANT: CPT

## 2019-06-05 PROCEDURE — 84443 ASSAY THYROID STIM HORMONE: CPT

## 2019-06-05 PROCEDURE — 83735 ASSAY OF MAGNESIUM: CPT

## 2019-06-05 PROCEDURE — 6370000000 HC RX 637 (ALT 250 FOR IP): Performed by: EMERGENCY MEDICINE

## 2019-06-05 PROCEDURE — 99285 EMERGENCY DEPT VISIT HI MDM: CPT

## 2019-06-05 RX ORDER — DILTIAZEM HYDROCHLORIDE 5 MG/ML
25 INJECTION INTRAVENOUS ONCE
Status: COMPLETED | OUTPATIENT
Start: 2019-06-05 | End: 2019-06-05

## 2019-06-05 RX ORDER — SODIUM CHLORIDE 0.9 % (FLUSH) 0.9 %
10 SYRINGE (ML) INJECTION PRN
Status: DISCONTINUED | OUTPATIENT
Start: 2019-06-05 | End: 2019-06-07 | Stop reason: HOSPADM

## 2019-06-05 RX ORDER — LANOLIN ALCOHOL/MO/W.PET/CERES
3 CREAM (GRAM) TOPICAL NIGHTLY PRN
Status: DISCONTINUED | OUTPATIENT
Start: 2019-06-05 | End: 2019-06-07 | Stop reason: HOSPADM

## 2019-06-05 RX ORDER — ONDANSETRON 2 MG/ML
4 INJECTION INTRAMUSCULAR; INTRAVENOUS EVERY 6 HOURS PRN
Status: DISCONTINUED | OUTPATIENT
Start: 2019-06-05 | End: 2019-06-05 | Stop reason: SDUPTHER

## 2019-06-05 RX ORDER — METOPROLOL SUCCINATE 50 MG/1
50 TABLET, EXTENDED RELEASE ORAL DAILY
Status: ON HOLD | COMMUNITY
End: 2019-06-07 | Stop reason: HOSPADM

## 2019-06-05 RX ORDER — ASPIRIN 81 MG/1
81 TABLET, CHEWABLE ORAL DAILY
Status: DISCONTINUED | OUTPATIENT
Start: 2019-06-05 | End: 2019-06-07 | Stop reason: HOSPADM

## 2019-06-05 RX ORDER — METOPROLOL SUCCINATE 50 MG/1
50 TABLET, EXTENDED RELEASE ORAL DAILY
Status: DISCONTINUED | OUTPATIENT
Start: 2019-06-05 | End: 2019-06-07 | Stop reason: HOSPADM

## 2019-06-05 RX ORDER — ONDANSETRON 4 MG/1
4 TABLET, ORALLY DISINTEGRATING ORAL EVERY 6 HOURS PRN
Status: DISCONTINUED | OUTPATIENT
Start: 2019-06-05 | End: 2019-06-07 | Stop reason: HOSPADM

## 2019-06-05 RX ORDER — ROSUVASTATIN CALCIUM 10 MG/1
10 TABLET, COATED ORAL NIGHTLY
Status: DISCONTINUED | OUTPATIENT
Start: 2019-06-05 | End: 2019-06-07 | Stop reason: HOSPADM

## 2019-06-05 RX ORDER — NIFEDIPINE 60 MG/1
60 TABLET, EXTENDED RELEASE ORAL DAILY
Status: ON HOLD | COMMUNITY
End: 2019-06-07 | Stop reason: HOSPADM

## 2019-06-05 RX ORDER — 0.9 % SODIUM CHLORIDE 0.9 %
500 INTRAVENOUS SOLUTION INTRAVENOUS ONCE
Status: COMPLETED | OUTPATIENT
Start: 2019-06-05 | End: 2019-06-05

## 2019-06-05 RX ORDER — SODIUM CHLORIDE 0.9 % (FLUSH) 0.9 %
10 SYRINGE (ML) INJECTION EVERY 12 HOURS SCHEDULED
Status: DISCONTINUED | OUTPATIENT
Start: 2019-06-05 | End: 2019-06-07 | Stop reason: HOSPADM

## 2019-06-05 RX ORDER — NICOTINE 21 MG/24HR
1 PATCH, TRANSDERMAL 24 HOURS TRANSDERMAL DAILY PRN
Status: DISCONTINUED | OUTPATIENT
Start: 2019-06-05 | End: 2019-06-07 | Stop reason: HOSPADM

## 2019-06-05 RX ORDER — ACETAMINOPHEN 325 MG/1
650 TABLET ORAL EVERY 4 HOURS PRN
Status: DISCONTINUED | OUTPATIENT
Start: 2019-06-05 | End: 2019-06-07 | Stop reason: HOSPADM

## 2019-06-05 RX ORDER — LORAZEPAM 0.5 MG/1
0.5 TABLET ORAL ONCE
Status: COMPLETED | OUTPATIENT
Start: 2019-06-05 | End: 2019-06-05

## 2019-06-05 RX ORDER — DIGOXIN 125 MCG
125 TABLET ORAL DAILY
Status: DISCONTINUED | OUTPATIENT
Start: 2019-06-05 | End: 2019-06-06

## 2019-06-05 RX ORDER — DILTIAZEM HYDROCHLORIDE 5 MG/ML
20 INJECTION INTRAVENOUS ONCE
Status: COMPLETED | OUTPATIENT
Start: 2019-06-05 | End: 2019-06-05

## 2019-06-05 RX ORDER — ONDANSETRON 2 MG/ML
4 INJECTION INTRAMUSCULAR; INTRAVENOUS EVERY 6 HOURS PRN
Status: DISCONTINUED | OUTPATIENT
Start: 2019-06-05 | End: 2019-06-07 | Stop reason: HOSPADM

## 2019-06-05 RX ORDER — METOPROLOL SUCCINATE 50 MG/1
50 TABLET, EXTENDED RELEASE ORAL ONCE
Status: COMPLETED | OUTPATIENT
Start: 2019-06-05 | End: 2019-06-05

## 2019-06-05 RX ADMIN — RIVAROXABAN 20 MG: 20 TABLET, FILM COATED ORAL at 09:03

## 2019-06-05 RX ADMIN — DILTIAZEM HYDROCHLORIDE 20 MG: 5 INJECTION INTRAVENOUS at 07:33

## 2019-06-05 RX ADMIN — SODIUM CHLORIDE 500 ML: 9 INJECTION, SOLUTION INTRAVENOUS at 09:17

## 2019-06-05 RX ADMIN — ROSUVASTATIN CALCIUM 10 MG: 10 TABLET, FILM COATED ORAL at 21:17

## 2019-06-05 RX ADMIN — DILTIAZEM HYDROCHLORIDE 25 MG: 5 INJECTION INTRAVENOUS at 08:22

## 2019-06-05 RX ADMIN — DILTIAZEM HYDROCHLORIDE 5 MG/HR: 5 INJECTION INTRAVENOUS at 07:44

## 2019-06-05 RX ADMIN — MELATONIN TAB 3 MG 3 MG: 3 TAB at 21:17

## 2019-06-05 RX ADMIN — LORAZEPAM 0.5 MG: 0.5 TABLET ORAL at 21:36

## 2019-06-05 RX ADMIN — DILTIAZEM HYDROCHLORIDE 15 MG/HR: 5 INJECTION INTRAVENOUS at 18:40

## 2019-06-05 RX ADMIN — DIGOXIN 125 MCG: 125 TABLET ORAL at 11:41

## 2019-06-05 RX ADMIN — METOPROLOL SUCCINATE 50 MG: 50 TABLET, FILM COATED, EXTENDED RELEASE ORAL at 18:40

## 2019-06-05 ASSESSMENT — ENCOUNTER SYMPTOMS
SHORTNESS OF BREATH: 1
NAUSEA: 0

## 2019-06-05 ASSESSMENT — PAIN SCALES - GENERAL: PAINLEVEL_OUTOF10: 0

## 2019-06-05 NOTE — FLOWSHEET NOTE
Writer rounding. Patient resting in bed and taking on phone. Patient interrupts call to speak briefly with writer. He expresses no needs at present, but thanks writer for stopping. Spiritual Care will follow as needed.        06/05/19 1123   Encounter Summary   Services provided to: Patient   Referral/Consult From: [facility]   (6/5/19)   Complexity of Encounter Low   Length of Encounter 15 minutes   Spiritual Assessment Completed Yes   Routine   Type Initial   Assessment Approachable   Intervention Active listening   Outcome Engaged in conversation

## 2019-06-05 NOTE — CONSULTS
[facility] Cardiology  Attending Consult Note    Reason for Consult:  Atrial fibrillation  Requesting Physician:  Oracio Taylor MD     CHIEF COMPLAINT:  Chest tightness    History of Present Illness:  Ashley Dickson is a 44 y.o. patient who presented to the hospital with the above chief complaint. He is known to Dr. Shawna Asencio and did have an episode with atrial fibrillation one year prior resulting in cardioversion. He was maintained on Zestril for some time but since he remained in sinus rhythm it was discontinued. He has done fairly well until last Sunday when he started experiencing chest discomfort. He stated it was mild, mostly when he lays down to sleep at night, not related to exertion. It was mostly in the anterior chest without radiation. It was associated with feeling fatigued and mild shortness of breath. Denies dizziness presyncope or syncope. He states that it is not similar to what he had one year prior. In the emergency room was found to be in atrial fibrillation with rapid ventricular response. Cardizem drip was initiated which seems to be helping him controlling his rate although it is not optimal yet. At the time of this evaluation he is feeling much better. Past Medical History:  Past Medical History:   Diagnosis Date    Atrial fibrillation ([facility] [address]     Hyperlipidemia     Hypertension     Skin cancer        Surgical History:   has a past surgical history that includes knee surgery. Social History:   reports that he has never smoked. He has never used smokeless tobacco. He reports that he drinks about 2.0 oz of alcohol per week. He reports that he does not use drugs. Family History:  family history includes Alzheimer's Disease in his mother. Home Medications:  Prior to Admission medications    Medication Sig Start Date End Date Taking?  Authorizing Provider   NIFEdipine (NIFEDICAL XL) 60 MG extended release tablet Take 60 mg by mouth daily   Yes Historical Provider, MD metoprolol succinate (TOPROL XL) 100 MG extended release tablet Take 1 tablet by mouth daily  Patient taking differently: Take 50 mg by mouth daily  6/2/18  Yes Qi Araujo MD   lisinopril (PRINIVIL;ZESTRIL) 20 MG tablet Take 40 mg by mouth daily    Yes Historical Provider, MD   Multiple Vitamins-Minerals (MULTIVITAMIN ADULT PO) Take by mouth   Yes Historical Provider, MD   aspirin 81 MG tablet Take 81 mg by mouth daily   Yes Historical Provider, MD   rosuvastatin (CRESTOR) 10 MG tablet Take 10 mg by mouth daily   Yes Historical Provider, MD        Current Medications:  Scheduled Meds:   [COMPLETED] sodium chloride  500 mL Intravenous Once     Continuous Infusions:   diltiazem (CARDIZEM) 125 mg in dextrose 5% 125 mL infusion 10 mg/hr (06/05/19 0824)     PRN Meds:. Allergies:  Patient has no known allergies. Review of Systems:   All 12 point review of symptoms completed. Pertinent positives identified in the HPI, all other review of symptoms negative as below. · Constitutional: there has been no unanticipated weight loss. There's been no change in energy level, sleep pattern, or activity level. · Eyes: No visual changes or diplopia. No scleral icterus. · ENT: No Headaches, hearing loss or vertigo. No mouth sores or sore throat. · Cardiovascular: +chest tightness. No palpitations or loss of consciousness. · Respiratory: + shortness of breath. No cough or wheezing, no sputum production. No hemoptysis. · Gastrointestinal: No nausea or vomiting. No abdominal pain, appetite loss, blood in stools. · Genitourinary: No dysuria, trouble voiding, or hematuria. · Musculoskeletal:  No gait disturbance, weakness or joint complaints. · Integumentary: No rash or pruritis. · Neurological: No headache, diplopia, change in muscle strength, numbness or tingling. No change in gait, balance, coordination, mood, affect, memory, mentation, behavior.   · Psychiatric: No anxiety or depression  · Endocrine: No temperature intolerance. No excessive thirst  · Hematologic/Lymphatic: No abnormal bruising or bleeding, or blood clots  · Allergic/Immunologic: No nasal congestion or hives. Physical Examination:    Vitals:    06/05/19 0923   BP: 93/73   Pulse: 114   Resp: 14   Temp:    SpO2: 93%       General: Alert, cooperative, no distress, appears stated age  Head:  Atraumatic, PERRL. Lips, mucosa, and tongue normal  Neck:  Supple, symmetric, no carotid bruit, no JVD  Lungs: Clear to auscultation bilaterally, respirations unlabored  Heart[de-identified]  Irregular rhythm, tachycardic, S1, S2 normal, no murmur, rub or gallop  Abdomen: Soft, non-tender, bowel sounds active all four quadrants  Extremities: Extremities normal, atraumatic, no cyanosis or edema  Skin:  Skin color, texture, turgor normal, no rashes or lesions  Neurologic: Alert and oriented x 3, normal gross motor and sensory exam.     Labs  CBC:  Recent Labs     06/05/19  0725   WBC 8.6   HGB 15.0   HCT 44.2        Magnesium:  Recent Labs     06/05/19  0725   MG 2.2     BMP:  Recent Labs     06/05/19  0725      K 4.2   CALCIUM 9.7   CO2 25   BUN 19   CREATININE 1.20   LABGLOM >60   GLUCOSE 121*     BNP:No results for input(s): BNP in the last 72 hours. PT/INR:  Recent Labs     06/05/19  0725   PROTIME 11.3   INR 1.1     APTT:  Recent Labs     06/05/19  0725   APTT 25.9     CARDIAC ENZYMES:  Recent Labs     06/05/19  0725   TROPHS 9     FASTING LIPID PANEL:  Lab Results   Component Value Date    HDL 24 01/20/2014    TRIG 259 01/20/2014     LIVER PROFILE:  Recent Labs     06/05/19  0725   AST 25   ALT 51*   LABALBU 4.5   ALKPHOS 56   BILITOT 0.67   PROT 6.8   ALBUMIN NOT REPORTED         EKG:  Atrial fibrillation with RVR    Echo: 5/30/2018  Summary  · Moderate left ventricular hypertrophy  · Global left ventricular systolic function is low normal. Estimated ejection fraction is 50-55 % . · Left atrium is moderately dilated. Right atrium is mildly dilated .   · No significant valvular regurgitation or stenosis seen. · No pericardial effusion seen. · Normal aortic root dimension. Stress test: 8/7/2018      Chest X-Ray: No acute findings    Assessment  1. Paroxysmal atrial fibrillation  2. Essential hypertension  3. Mixed hyperlipidemia      Plan:    I had the opportunity to review the clinical symptoms and presentation of Kierra Avila. Last time he was in the hospital medical therapy failed in controlling his heart rate and he ended up needing a KRISS/cardioversion. So far with initiation of diltiazem his heart rate is actually responding however his blood pressure did drop somewhat. We'll continue Cardizem drip, fluid bolus given in the ER and will assess response, we'll repeat an echocardiogram to assess his left ventricular function compared to last years. He did appear to have evidence of left ventricular hypertrophy which would make him preload dependent. He may end up needing another KRISS cardioversion, however at this stage would consider addition of antiarrhythmic drugs such as Multaq. Options also of ablation in the future should be considered. [id number] Score for Atrial Fibrillation Stroke Risk   Risk   Factors  Component Value   C CHF No 0   H HTN Yes 1   A2 Age >= 76 No,  (52 y.o.) 0   D DM No 0   S2 Prior Stroke/TIA No 0   V Vascular Disease No 0   A Age 74-69 No,  (52 y.o.) 0   Sc Sex male 0    [id number]  Score  1   Score last updated 6/5/19 9:53 AM    Given his chads score, anticoagulation will be initiated. All questions and concerns were addressed to the patient/family. Alternatives to my treatment were discussed. The note was completed using EMR. Every effort was made to ensure accuracy; however, inadvertent computerized transcription errors may be present. Thank you for allowing to us to participate in the care or Kierra Avila.  Further evaluation will be based upon the patient's clinical course and testing results.       Electronically signed by Juan F Segura MD on 6/5/2019 at 9:42 AM

## 2019-06-05 NOTE — CARE COORDINATION
[address] Associate (face to face visit only at this time, d/t E RRS/ CMRS)     2019    Patient: Ranjith Victoria Patient : 1970   MRN: [id number]  Reason for Admission: A fib with RVR  RARS: Readmission Risk Score: 8         Spoke with: Myron Bonilla    Met with pt in his room. Writer reviewed role of CTC following discharge- v/u. Contact information provided. States he has gone through the a fib last year and had been on meds/ anti coagulation for a few months, rhythm converted and has been off meds since.  States he had one cardioversion as well  States his wife works at [employer] provided- encouraged call after discharge if questions or concerns      Readmission Risk  Patient Active Problem List   Diagnosis    Hyperlipidemia    Atrial fibrillation with rapid ventricular response ([address]    Atrial fibrillation with RVR ([address]    Essential hypertension    Hypotension due to drugs       Inpatient Assessment  Care Transitions Summary    Care Transitions Inpatient Review  Medication Review  Housing Review  Social Support  Durable Medical Equipment  Functional Review  Hearing and Vision  Care Transitions Interventions         Follow Up  Future Appointments   Date Time Provider Abraham Moreno   6/10/2019  3:30 PM Jaylin Fang, [address] Maintenance  Health Maintenance Due   Topic Date Due    Lipid screen  2019       Murtaza Rivas RN

## 2019-06-05 NOTE — ED NOTES
ASSESSMENT:  Presents to Ed per self per pvt auto with c/o 2 -3 day hx intermittent chest tightness which seems to be worse at nite time when trying to sleep. Denies diff breathing , dizziness or lightheadedness. Has hx A fib. Last time 1 year ago requiring admission here in [facility] and needed cardioversion because was RVR and meds didn't work. Had had no further problems. Was on Xaralato but not now. And no longer takes digoxin. On admission points to mid ant chest as area of tightness. Very minimal now.  monio shows Afib with 's. Posted.      Herminio Hampton, RN  06/05/19 8633

## 2019-06-05 NOTE — PLAN OF CARE
Problem: Falls - Risk of: Intervention: Appropriate assistance to ensure safe transfer  6/5/2019 1225 by Nii Pollock, LARS  Note:   Continue fall precautions including arm band identification, falling star placed outside of the room and alarm at night and when unattended. Use of ambulatory aids when indicated and frequent visual checks. Monitored orientation status. Call light within reach at all times. Bed remains in lowest locked position. Frequent nursing rounds. Instructed to call with any needs for assistance. All clutter removed from room. All personal belongings within reach. Fall precautions remain in place. 6/5/2019 1056 by Nii Pollock RN  Note:   Continue fall precautions including arm band identification, falling star placed outside of the room and alarm at night and when unattended. Use of ambulatory aids when indicated and frequent visual checks. Monitored orientation status. Call light within reach at all times. Bed remains in lowest locked position. Frequent nursing rounds. Instructed to call for assistance prior to getting OOB. All clutter removed from room. All personal belongings in place. Problem: Cardiac Output - Decreased:  Intervention: Promote rest periods  Note:   Cardiac assessment performed and charted. Assessed for an apical/radial deficit. Assessed for c/o dizziness, lightheadedness . BP monitored every 30 minutes and PRN. Cardizem gtt maintained. Bed rest maintained at present time. Continuous cardiac monitoring.

## 2019-06-05 NOTE — PROGRESS NOTES
Patient resting in bed quietly at this time. Patient's heart rate continues to fluctuate between 90's -100's.  Patient remains in Atrial Fibrillation

## 2019-06-05 NOTE — PROGRESS NOTES
Transitions of Care Pharmacy Service   Medication Review    The patient's list of current home medications has been reviewed. Source(s) of information: Patient, [facility]     Based on information provided by the above source(s), I have updated the patient's home med list as described below. I changed or updated the following medications on the patient's home medication list:  Discontinued Digoxin 125mcg - pt no longer using, old prescription  Vitamin B12 - patient choice, no longer using     Added None      Adjusted   Lisinopril 20mg changed to 40mg (1QD)  Metoprolol Succinate XL 100mg changed to 50mg (1QD)  Multivitamin - added sig 1QD      Other Notes None            Please feel free to call me with any questions about this encounter. Thank you. This note will be reviewed and co-signed by the Transitions of Care Pharmacist. The pharmacist will review inpatient orders and contact the physician about any discrepancies. Dawson Arauz, pharmacy technician  Trans[facility] Service  Phone:  [phone]  Fax: [phone]      Electronically signed by Dawson Arauz on 6/5/2019 at 11:31 AM       Prior to Admission medications    Medication Sig Start Date End Date Taking?  Authorizing Provider   NIFEdipine (NIFEDICAL XL) 60 MG extended release tablet Take 60 mg by mouth daily       metoprolol succinate (TOPROL XL) 50 MG extended release tablet Take 50 mg by mouth daily       lisinopril (PRINIVIL;ZESTRIL) 40 MG tablet Take 40 mg by mouth daily        Multiple Vitamins-Minerals (MULTIVITAMIN ADULT PO) Take 1 tablet by mouth daily        aspirin 81 MG tablet Take 81 mg by mouth daily       rosuvastatin (CRESTOR) 10 MG tablet Take 10 mg by mouth daily

## 2019-06-05 NOTE — PLAN OF CARE
Problem: Falls - Risk of: Intervention: Appropriate assistance to ensure safe transfer  6/5/2019 1225 by Yari Costello RN  Note:   Continue fall precautions including arm band identification, falling star placed outside of the room and alarm at night and when unattended. Use of ambulatory aids when indicated and frequent visual checks. Monitored orientation status. Call light within reach at all times. Bed remains in lowest locked position. Frequent nursing rounds. Instructed to call with any needs for assistance. All clutter removed from room. All personal belongings within reach. Fall precautions remain in place. 6/5/2019 1056 by Yari Costello RN  Note:   Continue fall precautions including arm band identification, falling star placed outside of the room and alarm at night and when unattended. Use of ambulatory aids when indicated and frequent visual checks. Monitored orientation status. Call light within reach at all times. Bed remains in lowest locked position. Frequent nursing rounds. Instructed to call for assistance prior to getting OOB. All clutter removed from room. All personal belongings in place.

## 2019-06-05 NOTE — ED NOTES
Report to ICU and ready for patient.   Dr Malaika Lantigua still with patient     Brant Melendez, RN  06/05/19 7522

## 2019-06-05 NOTE — ED NOTES
HOB lowered from high fowlers to very low semi-fowlers.    Silvia Garza aware of low B/P     Matthew Ravi, LARS  06/05/19 5525

## 2019-06-05 NOTE — ED PROVIDER NOTES
EMERGENCY DEPARTMENT ENCOUNTER    Pt Name: Ashley Dickson  MRN: [id number]  [place] 1970  Date of evaluation: 6/5/19  CHIEF COMPLAINT       Chief Complaint   Patient presents with    Chest Pain     HISTORY OF PRESENT ILLNESS   Presents with retrosternal chest tightness for several days. No h/o MI with normal stress test 8/2018. Had A-fib in 5/2018. Required cardioversion for lack of response to Cardizem. Known to Dr. Shawna Asencio. Was on Xarelto and Digoxin for a few months, but taken off both by his Cardiologist. No episodes of A-fib since then. Takes ASA 81 mg daily, with last dose this morning. The history is provided by the patient. Chest Pain   Pain location:  Substernal area  Pain quality: tightness    Pain radiates to:  Does not radiate  Pain severity:  Mild  Onset quality:  Unable to specify  Duration:  5 days  Timing:  Intermittent  Progression:  Waxing and waning  Chronicity:  New  Relieved by:  Nothing  Exacerbated by: in bed at night. Ineffective treatments:  None tried  Associated symptoms: shortness of breath (mild)    Associated symptoms: no diaphoresis, no nausea and no near-syncope    Risk factors: no coronary artery disease        REVIEW OF SYSTEMS     Review of Systems   Constitutional: Negative for diaphoresis. HENT: Negative for congestion. Eyes: Negative for visual disturbance. Respiratory: Positive for shortness of breath (mild). Cardiovascular: Positive for chest pain. Negative for near-syncope. Gastrointestinal: Negative for nausea. Endocrine: Negative for polydipsia and polyuria. Musculoskeletal: Negative for neck pain. Skin: Negative for pallor. Neurological: Negative for light-headedness. Hematological: Does not bruise/bleed easily. Psychiatric/Behavioral: Negative for decreased concentration.      PASTMEDICAL HISTORY     Past Medical History:   Diagnosis Date    Atrial fibrillation ([facility] [address]     Hyperlipidemia     Hypertension     Skin cancer SURGICAL HISTORY       Past Surgical History:   Procedure Laterality Date    KNEE SURGERY       CURRENT MEDICATIONS       Previous Medications    ASPIRIN 81 MG TABLET    Take 81 mg by mouth daily    LISINOPRIL (PRINIVIL;ZESTRIL) 20 MG TABLET    Take 40 mg by mouth daily     METOPROLOL SUCCINATE (TOPROL XL) 100 MG EXTENDED RELEASE TABLET    Take 1 tablet by mouth daily    MULTIPLE VITAMINS-MINERALS (MULTIVITAMIN ADULT PO)    Take by mouth    NIFEDIPINE (NIFEDICAL XL) 60 MG EXTENDED RELEASE TABLET    Take 60 mg by mouth daily    ROSUVASTATIN (CRESTOR) 10 MG TABLET    Take 10 mg by mouth daily     ALLERGIES     has No Known Allergies. FAMILY HISTORY     indicated that the status of his mother is unknown. SOCIAL HISTORY       Social History     Tobacco Use    Smoking status: Never Smoker    Smokeless tobacco: Never Used   Substance Use Topics    Alcohol use: Yes     Alcohol/week: 2.0 oz     Types: 4 drink(s) per week     Comment: socially    Drug use: No     PHYSICAL EXAM     INITIAL VITALS: BP 93/73   Pulse 114   Temp 98.3 °F (36.8 °C) (Oral)   Resp 14   Ht 6' 4\" (1.93 m)   Wt 277 lb (125.6 kg)   SpO2 93%   BMI 33.72 kg/m²    Physical Exam   Constitutional: He appears well-developed and well-nourished. He appears distressed (mild). HENT:   Nose: No rhinorrhea. Mouth/Throat: Oropharynx is clear and moist.   Eyes: Conjunctivae are normal. No scleral icterus. Neck: No JVD present. No thyromegaly present. Cardiovascular: Intact distal pulses. An irregularly irregular rhythm present. Tachycardia present. Pulmonary/Chest: Effort normal and breath sounds normal. No respiratory distress. He has no rales. Abdominal: Soft. There is no tenderness. There is no guarding. Musculoskeletal: He exhibits no edema or tenderness. No calf tenderness or asymmetry   Neurological: He is alert. He has normal strength. No sensory deficit. He exhibits normal muscle tone.    Skin: Capillary refill takes less than 2 seconds. No erythema. No pallor. No erythema or warmth in lower legs   Psychiatric: He has a normal mood and affect. His behavior is normal.       MEDICAL DECISION MAKING:   Presents in A-fib with RVR. Plan Cardizem IV and ACS w/u. Will discuss Heparin vs Xarelto with Cardiologist after w/u complete. Patient agreeable. Patient given initial Cardizem bolus and drip. Labs reviewed. Negative troponin. Normal potassium and magnesium. Normal TSH. CXR is negative for pulmonary edema, as read by Radiologist and reviewed by me. I d/w Dr. Juan C Campos, covering for Dr. Mariusz Evangelista. Restart Xarelto. Patient agreeable. HR remains elevated with stable BP. Repeat Cardizem bolus. Drip increased to 10 mg/hr. PCP admits to [facility]. Jenniferd. HR now 90 bpm, but BP decreased to 80/60's    Accepted by [facility]. Likely place in ICU due to hypotension on Cardizem. If remains hypotensive after initial bolus, then will discuss options with Dr. Juan C Campos. Dr. Juan C Campos arrived at bedside. Recommends 500 mL bolus of NS to start. Considers starting an antiarrhythmic in ICU.          CRITICAL CARE:       PROCEDURES:    Critical Care  Performed by: Nighat Chaudhari MD  Authorized by: Nighat Chauhdari MD     Critical care provider statement:     Critical care time (minutes):  35    Critical care time was exclusive of:  Separately billable procedures and treating other patients    Critical care was necessary to treat or prevent imminent or life-threatening deterioration of the following conditions:  Cardiac failure    Critical care was time spent personally by me on the following activities:  Development of treatment plan with patient or surrogate, discussions with consultants, evaluation of patient's response to treatment, examination of patient, obtaining history from patient or surrogate, re-evaluation of patient's condition, pulse oximetry, ordering and review of laboratory studies, ordering and review of radiographic

## 2019-06-05 NOTE — H&P
[facility]    HISTORY AND PHYSICAL EXAMINATION            Date:   6/5/2019  Patient name:  Casey Pulido  Date of admission:  6/5/2019  7:09 AM  MRN:   [id number]  Account:  [de-identified]  YOB: 1970  PCP:    Chelsea Boyer MD  Room:   1627/7074-41  Code Status:    Full Code    Chief Complaint:     Chief Complaint   Patient presents with    Chest Pain       History Obtained From:     patient, electronic medical record    History of Present Illness: The patient is a 44 y.o. M with PMH significant for HTN, A fib, HLD who presented with chest pressure. Patient states he was having some chest tightness going on for the past 3 days. States symptoms associated with shortness of breath. In ER EKG demonstrating A fib with RVR. Labs and CXR unremarkable. Patient has history of A fib s/p cardioversion. Currently follows with cardiology. Was previously on Xarelto and Digoxin but taken off medications. Started on cardizem infusion in ER, admitted for further evaluation. Past Medical History:     Past Medical History:   Diagnosis Date    Atrial fibrillation ([address]     Hyperlipidemia     Hypertension     Skin cancer         Past Surgical History:     Past Surgical History:   Procedure Laterality Date    KNEE SURGERY          Medications Prior to Admission:     Prior to Admission medications    Medication Sig Start Date End Date Taking?  Authorizing Provider   NIFEdipine (NIFEDICAL XL) 60 MG extended release tablet Take 60 mg by mouth daily   Yes Historical Provider, MD   metoprolol succinate (TOPROL XL) 50 MG extended release tablet Take 50 mg by mouth daily   Yes Historical Provider, MD   lisinopril (PRINIVIL;ZESTRIL) 40 MG tablet Take 40 mg by mouth daily    Yes Historical Provider, MD   Multiple Vitamins-Minerals (MULTIVITAMIN ADULT PO) Take 1 tablet by mouth daily    Yes Historical Provider, MD   aspirin 81 MG tablet Take 81 mg by mouth daily   Yes Historical Provider, MD   rosuvastatin (CRESTOR) 10 MG tablet Take 10 mg by mouth daily   Yes Historical Provider, MD        Allergies:     Patient has no known allergies. Social History:     Tobacco:    reports that he has never smoked. He has never used smokeless tobacco.  Alcohol:      reports that he drinks about 2.0 oz of alcohol per week. Drug Use:  reports that he does not use drugs. Family History:     Family History   Problem Relation Age of Onset    Alzheimer's Disease Mother        Review of Systems:     Positive and Negative as described in HPI. CONSTITUTIONAL:  negative for fevers, chills, sweats, fatigue, weight loss  HEENT:  negative for vision, hearing changes, runny nose, throat pain  RESPIRATORY:  Positive for shortness of breath  CARDIOVASCULAR:  Positive for chest tignthess  GASTROINTESTINAL:  negative for nausea, vomiting, diarrhea, constipation, change in bowel habits, abdominal pain   GENITOURINARY:  negative for difficulty of urination, burning with urination, frequency   INTEGUMENT:  negative for rash, skin lesions, easy bruising   HEMATOLOGIC/LYMPHATIC:  negative for swelling/edema   ALLERGIC/IMMUNOLOGIC:  negative for urticaria, itching  ENDOCRINE:  negative increase in drinking, increase in urination, hot or cold intolerance  MUSCULOSKELETAL:  negative joint pains, muscle aches, swelling of joints  NEUROLOGICAL:  negative for headaches, dizziness, lightheadedness, numbness, pain, tingling extremities  BEHAVIOR/PSYCH:  negative for depression, anxiety    Physical Exam:   /70   Pulse 104   Temp 98.4 °F (36.9 °C) (Oral)   Resp 16   Ht 6' 4\" (1.93 m)   Wt 277 lb (125.6 kg)   SpO2 93%   BMI 33.72 kg/m²   Temp (24hrs), Av.4 °F (36.9 °C), Min:98.3 °F (36.8 °C), Max:98.4 °F (36.9 °C)    No results for input(s): POCGLU in the last 72 hours.   No intake or output data in the 24 hours ending 19 1229    General Appearance:  alert, well appearing, and in no acute distress  Mental status: oriented to person, place, and time with normal affect  Head:  normocephalic, atraumatic.   Eye: no icterus, redness, pupils equal and reactive, extraocular eye movements intact, conjunctiva clear  Ear: normal external ear, no discharge, hearing intact  Nose:  no drainage noted  Mouth: mucous membranes moist  Neck: supple, no carotid bruits  Lungs: Bilateral equal air entry, clear to auscultation, no wheezing  Cardiovascular: tachycardic, irregular rhythm   Abdomen: Soft, nontender, nondistended, normal bowel sounds  Neurologic: There are no new focal motor or sensory deficits, normal muscle tone and bulk, no abnormal sensation, normal speech, cranial nerves II through XII grossly intact  Skin: No gross lesions, rashes, bruising or bleeding on exposed skin area  Extremities:  peripheral pulses palpable, no edema or calf pain with palpation  Psych: normal affect     Investigations:      Laboratory Testing:  Recent Results (from the past 24 hour(s))   EKG 12 Lead    Collection Time: 06/05/19  7:14 AM   Result Value Ref Range    Ventricular Rate 144 BPM    Atrial Rate 159 BPM    QRS Duration 82 ms    Q-T Interval 280 ms    QTc Calculation (Bazett) 433 ms    R Axis 44 degrees    T Axis -51 degrees   CBC Auto Differential    Collection Time: 06/05/19  7:25 AM   Result Value Ref Range    WBC 8.6 3.5 - 11.3 k/uL    RBC 4.89 4.21 - 5.77 m/uL    Hemoglobin 15.0 13.0 - 17.0 g/dL    Hematocrit 44.2 40.7 - 50.3 %    MCV 90.4 82.6 - 102.9 fL    MCH 30.7 25.2 - 33.5 pg    MCHC 33.9 28.4 - 34.8 g/dL    RDW 13.0 11.8 - 14.4 %    Platelets 548 701 - 534 k/uL    MPV 9.2 8.1 - 13.5 fL    NRBC Automated 0.0 0.0 per 100 WBC    Differential Type NOT REPORTED     Seg Neutrophils 63 36 - 65 %    Lymphocytes 24 24 - 43 %    Monocytes 10 3 - 12 %    Eosinophils % 1 1 - 4 %    Basophils 1 0 - 2 %    Immature Granulocytes 1 (H) 0 %    Segs Absolute 5.48 1.50 - 8.10 k/uL    Absolute Lymph # 2. 07 1.10 - 3.70 k/uL    Absolute Mono # 0.82 0.10 - 1.20 k/uL    Absolute Eos # 0.11 0.00 - 0.44 k/uL    Basophils # 0.04 0.00 - 0.20 k/uL    Absolute Immature Granulocyte 0.04 0.00 - 0.30 k/uL    WBC Morphology NOT REPORTED     RBC Morphology NOT REPORTED     Platelet Estimate NOT REPORTED    Comprehensive Metabolic Panel    Collection Time: 06/05/19  7:25 AM   Result Value Ref Range    Glucose 121 (H) 70 - 99 mg/dL    BUN 19 6 - 20 mg/dL    CREATININE 1.20 0.70 - 1.20 mg/dL    Bun/Cre Ratio 16 9 - 20    Calcium 9.7 8.6 - 10.4 mg/dL    Sodium 141 135 - 144 mmol/L    Potassium 4.2 3.7 - 5.3 mmol/L    Chloride 104 98 - 107 mmol/L    CO2 25 20 - 31 mmol/L    Anion Gap 12 9 - 17 mmol/L    Alkaline Phosphatase 56 40 - 129 U/L    ALT 51 (H) 5 - 41 U/L    AST 25 <40 U/L    Total Bilirubin 0.67 0.3 - 1.2 mg/dL    Total Protein 6.8 6.4 - 8.3 g/dL    Alb 4.5 3.5 - 5.2 g/dL    Albumin/Globulin Ratio NOT REPORTED 1.0 - 2.5    GFR Non-African American >60 >60 mL/min    GFR African American >60 >60 mL/min    GFR Comment          GFR Staging NOT REPORTED    APTT    Collection Time: 06/05/19  7:25 AM   Result Value Ref Range    PTT 25.9 23 - 31 sec   Protime-INR    Collection Time: 06/05/19  7:25 AM   Result Value Ref Range    Protime 11.3 9.7 - 11.6 sec    INR 1.1    TSH with Reflex    Collection Time: 06/05/19  7:25 AM   Result Value Ref Range    TSH 1.84 0.30 - 5.00 mIU/L   Magnesium    Collection Time: 06/05/19  7:25 AM   Result Value Ref Range    Magnesium 2.2 1.6 - 2.6 mg/dL   Troponin    Collection Time: 06/05/19  7:25 AM   Result Value Ref Range    Troponin, High Sensitivity 9 0 - 22 ng/L    Troponin T NOT REPORTED <0.03 ng/mL    Troponin Interp NOT REPORTED    Digoxin Level    Collection Time: 06/05/19  7:25 AM   Result Value Ref Range    Digoxin Lvl <0.3 (L) 0.5 - 2.0 ng/mL    Digoxin Dose Amount NOT REPORTED     Digoxin Date Last Dose NOT REPORTED     Digoxin Dose Time NOT REPORTED        Imaging/Diagnostics:    Xr Chest Portable    Result Date: 6/5/2019  No acute findings. Assessment :      Primary Problem  Atrial fibrillation with RVR [facility])    Active Hospital Problems    Diagnosis Date Noted    Atrial fibrillation with RVR [address] [address] [I48.91] 06/05/2019    Essential hypertension [I10] 06/05/2019    Hypotension due to drugs [I95.2] 06/05/2019    Hyperlipidemia [E78.5] 07/08/2013       Plan:     Patient status Admit as inpatient in the  Cardiovascular ICU    - Labs, imaging, EKG reviewed  - Continue cardizem infusion  - Cardiology consulted  - Xarelto for anticoagulation  - Continue digoxin, BB  - Possible antiarrhythmic per cardiology  - IVF bolus PRN for hypotension  - Telemetry monitoring  - Further medications adjustments and need for intervention per cardiology       Consultations:   IP CONSULT TO CARDIOLOGY  IP CONSULT TO INTERNAL MEDICINE  IP CONSULT TO SOCIAL WORK     Patient is admitted as inpatient status because of co-morbidities listed above, severity of signs and symptoms as outlined, requirement for current medical therapies and most importantly because of direct risk to patient if care not provided in a hospital setting.     Wanda Aguirre MD  6/5/2019  12:29 PM    Copy sent to Dr. Vinay Hawk MD

## 2019-06-06 LAB
ALBUMIN SERPL-MCNC: 4 G/DL (ref 3.5–5.2)
ALBUMIN/GLOBULIN RATIO: ABNORMAL (ref 1–2.5)
ALP BLD-CCNC: 42 U/L (ref 40–129)
ALT SERPL-CCNC: 43 U/L (ref 5–41)
ANION GAP SERPL CALCULATED.3IONS-SCNC: 11 MMOL/L (ref 9–17)
AST SERPL-CCNC: 27 U/L
BILIRUB SERPL-MCNC: 1.11 MG/DL (ref 0.3–1.2)
BNP INTERPRETATION: ABNORMAL
BUN BLDV-MCNC: 17 MG/DL (ref 6–20)
BUN/CREAT BLD: 15 (ref 9–20)
CALCIUM SERPL-MCNC: 8.9 MG/DL (ref 8.6–10.4)
CHLORIDE BLD-SCNC: 104 MMOL/L (ref 98–107)
CO2: 24 MMOL/L (ref 20–31)
CREAT SERPL-MCNC: 1.16 MG/DL (ref 0.7–1.2)
GFR AFRICAN AMERICAN: >60 ML/MIN
GFR NON-AFRICAN AMERICAN: >60 ML/MIN
GFR SERPL CREATININE-BSD FRML MDRD: ABNORMAL ML/MIN/{1.73_M2}
GFR SERPL CREATININE-BSD FRML MDRD: ABNORMAL ML/MIN/{1.73_M2}
GLUCOSE BLD-MCNC: 103 MG/DL (ref 70–99)
HCT VFR BLD CALC: 39.4 % (ref 40.7–50.3)
HEMOGLOBIN: 13.4 G/DL (ref 13–17)
MAGNESIUM: 2.2 MG/DL (ref 1.6–2.6)
MCH RBC QN AUTO: 31 PG (ref 25.2–33.5)
MCHC RBC AUTO-ENTMCNC: 34 G/DL (ref 28.4–34.8)
MCV RBC AUTO: 91.2 FL (ref 82.6–102.9)
NRBC AUTOMATED: 0 PER 100 WBC
PDW BLD-RTO: 13.1 % (ref 11.8–14.4)
PLATELET # BLD: 231 K/UL (ref 138–453)
PMV BLD AUTO: 9.4 FL (ref 8.1–13.5)
POTASSIUM SERPL-SCNC: 4.7 MMOL/L (ref 3.7–5.3)
PRO-BNP: 765 PG/ML
RBC # BLD: 4.32 M/UL (ref 4.21–5.77)
SODIUM BLD-SCNC: 139 MMOL/L (ref 135–144)
TOTAL PROTEIN: 6.1 G/DL (ref 6.4–8.3)
TSH SERPL DL<=0.05 MIU/L-ACNC: 1.63 MIU/L (ref 0.3–5)
WBC # BLD: 8.8 K/UL (ref 3.5–11.3)

## 2019-06-06 PROCEDURE — 2500000003 HC RX 250 WO HCPCS: Performed by: INTERNAL MEDICINE

## 2019-06-06 PROCEDURE — 6370000000 HC RX 637 (ALT 250 FOR IP): Performed by: INTERNAL MEDICINE

## 2019-06-06 PROCEDURE — 36415 COLL VENOUS BLD VENIPUNCTURE: CPT

## 2019-06-06 PROCEDURE — 83735 ASSAY OF MAGNESIUM: CPT

## 2019-06-06 PROCEDURE — 2580000003 HC RX 258: Performed by: EMERGENCY MEDICINE

## 2019-06-06 PROCEDURE — 2580000003 HC RX 258: Performed by: INTERNAL MEDICINE

## 2019-06-06 PROCEDURE — 85027 COMPLETE CBC AUTOMATED: CPT

## 2019-06-06 PROCEDURE — 99232 SBSQ HOSP IP/OBS MODERATE 35: CPT | Performed by: INTERNAL MEDICINE

## 2019-06-06 PROCEDURE — 84443 ASSAY THYROID STIM HORMONE: CPT

## 2019-06-06 PROCEDURE — 80053 COMPREHEN METABOLIC PANEL: CPT

## 2019-06-06 PROCEDURE — 2500000003 HC RX 250 WO HCPCS: Performed by: EMERGENCY MEDICINE

## 2019-06-06 PROCEDURE — 2000000000 HC ICU R&B

## 2019-06-06 PROCEDURE — 83880 ASSAY OF NATRIURETIC PEPTIDE: CPT

## 2019-06-06 PROCEDURE — 6360000002 HC RX W HCPCS: Performed by: INTERNAL MEDICINE

## 2019-06-06 RX ORDER — ZOLPIDEM TARTRATE 5 MG/1
5 TABLET ORAL NIGHTLY PRN
Status: DISCONTINUED | OUTPATIENT
Start: 2019-06-06 | End: 2019-06-07 | Stop reason: HOSPADM

## 2019-06-06 RX ORDER — FLUTICASONE PROPIONATE 50 MCG
2 SPRAY, SUSPENSION (ML) NASAL DAILY
Status: DISCONTINUED | OUTPATIENT
Start: 2019-06-06 | End: 2019-06-07 | Stop reason: HOSPADM

## 2019-06-06 RX ORDER — DIGOXIN 250 MCG
250 TABLET ORAL DAILY
Status: DISCONTINUED | OUTPATIENT
Start: 2019-06-07 | End: 2019-06-07 | Stop reason: HOSPADM

## 2019-06-06 RX ORDER — DILTIAZEM HYDROCHLORIDE 60 MG/1
60 TABLET, FILM COATED ORAL EVERY 8 HOURS SCHEDULED
Status: DISCONTINUED | OUTPATIENT
Start: 2019-06-06 | End: 2019-06-07 | Stop reason: HOSPADM

## 2019-06-06 RX ORDER — DIGOXIN 0.25 MG/ML
125 INJECTION INTRAMUSCULAR; INTRAVENOUS ONCE
Status: DISCONTINUED | OUTPATIENT
Start: 2019-06-06 | End: 2019-06-06

## 2019-06-06 RX ORDER — DIGOXIN 0.25 MG/ML
250 INJECTION INTRAMUSCULAR; INTRAVENOUS ONCE
Status: COMPLETED | OUTPATIENT
Start: 2019-06-06 | End: 2019-06-06

## 2019-06-06 RX ADMIN — ASPIRIN 81 MG 81 MG: 81 TABLET ORAL at 07:39

## 2019-06-06 RX ADMIN — DILTIAZEM HYDROCHLORIDE 10 MG/HR: 5 INJECTION INTRAVENOUS at 18:04

## 2019-06-06 RX ADMIN — DILTIAZEM HYDROCHLORIDE 7.5 MG/HR: 5 INJECTION INTRAVENOUS at 06:28

## 2019-06-06 RX ADMIN — ZOLPIDEM TARTRATE 5 MG: 5 TABLET ORAL at 20:47

## 2019-06-06 RX ADMIN — DIGOXIN 250 MCG: 0.25 INJECTION INTRAMUSCULAR; INTRAVENOUS at 18:04

## 2019-06-06 RX ADMIN — DIGOXIN 250 MCG: 0.25 INJECTION INTRAMUSCULAR; INTRAVENOUS at 17:33

## 2019-06-06 RX ADMIN — FLUTICASONE PROPIONATE 2 SPRAY: 50 SPRAY, METERED NASAL at 18:10

## 2019-06-06 RX ADMIN — DILTIAZEM HYDROCHLORIDE 60 MG: 60 TABLET, FILM COATED ORAL at 20:47

## 2019-06-06 RX ADMIN — METOPROLOL SUCCINATE 50 MG: 50 TABLET, FILM COATED, EXTENDED RELEASE ORAL at 07:39

## 2019-06-06 RX ADMIN — ROSUVASTATIN CALCIUM 10 MG: 10 TABLET, FILM COATED ORAL at 20:47

## 2019-06-06 RX ADMIN — DIGOXIN 125 MCG: 125 TABLET ORAL at 07:39

## 2019-06-06 RX ADMIN — RIVAROXABAN 20 MG: 20 TABLET, FILM COATED ORAL at 17:45

## 2019-06-06 NOTE — PROGRESS NOTES
Physical Therapy  DATE: 2019    NAME: Miguelito Taylor  MRN: [id number]   : 1970    Patient not seen this date for Physical Therapy due to:  [] Blood transfusion in progress  [] Hemodialysis  [x]  Patient Declined -- states he is IND and safe with all mobility. RN in agreement.  [] Spine Precautions   [] Strict Bedrest  [] Surgery/ Procedure  [] Testing      [] Other        [x] PT being discontinued at this time. Patient independent. No further needs. [] PT being discontinued at this time as the patient has been transferred to palliative care. No further needs.     Gagan Serrano, PT

## 2019-06-06 NOTE — PROGRESS NOTES
Heart rate still not under great control, telemetry showing afib 110-120's still, cardizem turned up to 15ml/hr, blood pressure stable, will continue to monitor

## 2019-06-06 NOTE — PROGRESS NOTES
Dr. Bernice Perez in to see pt, updated on pt condition, informed about increasing IV cardizem during day, multiple new orders received, md will given IV dig now x 2 and start po cardizem tonight and try and wean IV cardizem off, keep NPO after midnight in case of cardioversion tomorrow    1750 Dr. [facility] Come back in to see pt, updated on pt condition and new medication changes, md would like pt to stay overnight in ICU still with new medication changes

## 2019-06-06 NOTE — PROGRESS NOTES
acetaminophen, ondansetron **OR** ondansetron, melatonin    Data:     Past Medical History:   has a past medical history of Atrial fibrillation ([address] Hyperlipidemia, Hypertension, and Skin cancer. Social History:   reports that he has never smoked. He has never used smokeless tobacco. He reports that he drinks about 2.0 oz of alcohol per week. He reports that he does not use drugs. Family History:   Family History   Problem Relation Age of Onset    Alzheimer's Disease Mother        Vitals:  BP (!) 120/90   Pulse 115   Temp 98.8 °F (37.1 °C)   Resp 21   Ht 6' 4\" (1.93 m)   Wt 277 lb (125.6 kg)   SpO2 95%   BMI 33.72 kg/m²   Temp (24hrs), Av.1 °F (36.7 °C), Min:97.8 °F (36.6 °C), Max:98.8 °F (37.1 °C)    No results for input(s): POCGLU in the last 72 hours. I/O (24Hr):     Intake/Output Summary (Last 24 hours) at 2019 1259  Last data filed at 2019 1834  Gross per 24 hour   Intake 116.67 ml   Output 350 ml   Net -233.33 ml       Labs:    Hematology:  Recent Labs     19   WBC 8.6 8.8   RBC 4.89 4.32   HGB 15.0 13.4   HCT 44.2 39.4*   MCV 90.4 91.2   MCH 30.7 31.0   MCHC 33.9 34.0   RDW 13.0 13.1    231   MPV 9.2 9.4   INR 1.1  --      Chemistry:  Recent Labs     19  0719    139   K 4.2 4.7    104   CO2 25 24   GLUCOSE 121* 103*   BUN 19 17   CREATININE 1.20 1.16   MG 2.2 2.2   ANIONGAP 12 11   LABGLOM >60 >60   GFRAA >60 >60   CALCIUM 9.7 8.9   PROBNP  --  765*   TROPHS 9  --    DIGOXIN <0.3*  --      Recent Labs     19   PROT 6.8 6.1*   LABALBU 4.5 4.0   TSH 1.84 1.63   AST 25 27   ALT 51* 43*   ALKPHOS 56 42   BILITOT 0.67 1.11         No results found for: SPECIAL  No results found for: CULTURE    No results found for: POCPH, PHART, PH, POCPCO2, [id number], PCO2, POCPO2, PO2ART, PO2, POCHCO3, MZS5WVC, HCO3, NBEA, PBEA, BEART, BE, THGBART, THB, HUW5XPE, [id number], Q1OZOXSX, O2SAT,

## 2019-06-06 NOTE — PLAN OF CARE
Patient has remained free from falls this shift. Patient is alert and oriented times four. Bed to lowest position with door open. Patient care items and call light in reach. Patient uses call light appropriately for assist. Will continue to monitor. Please see fall assessment.       Careplan reviewed with pt and family

## 2019-06-06 NOTE — CARE COORDINATION
Case Management Initial Discharge Plan  Car Louder,         Readmission Risk              Risk of Unplanned Readmission:        8             Met with:patient to discuss discharge plans. Information verified: address, contacts, phone number, , insurance Yes  PCP: Berenice Hou MD  Date of last visit: January    Insurance Provider: VINAYAK    Discharge Planning  Current Residence:  house  Living Arrangements:  Alone   Home has 2 stories/1 flight stairs to climb  Support Systems:  Family Members  Current Services PTA:   none Agency:  none  Patient able to perform ADL's:Independent  DME in home:   none  DME used to aid ambulation prior to admission:    none  DME used during admission:   none    Potential Assistance Needed:  N/A    Pharmacy: [facility]   Potential Assistance Purchasing Medications:  No  Does patient want to participate in local refill/ meds to beds program?  Not Assessed    Patient agreeable to home care: No  [place] of choice provided:  n/a      Type of Home Care Services:  None  Patient expects to be discharged to:  home    Prior SNF/Rehab Placement and Facility:  none  Agreeable to SNF/Rehab: No  [place] of choice provided: n/a   Evaluation: no    Expected Discharge date:  19  Follow Up Appointment: Best Day/ Time: Monday AM    Transportation provider: self  Transportation arrangements needed for discharge: No    Discharge Plan: Met with pt at bedside       Pt admitted with afib. Currently on IV cardigem gtt. Await cardio plan for cardioversion if pt doesn't convert. Pt has had one dose of xarelto today. Await script. Pt has history of afib and was on xarelto at home in the past.  Had previous cardioversion and xarelto was stopped about 6 months ago. Pt uses [facility]. States he had $10 copay. [address] [facility]. Pt will need script with new coupon at discharge.      Pt lives with his spouse and stepchildren and is

## 2019-06-06 NOTE — PROGRESS NOTES
systolic function is mildly decreased with an  estimated ejection fraction is 45% . Function assessment is difficult due to irregular rhythm  Left atrium is moderately dilated. Right atrial dilatation. No significant valvular regurgitation or stenosis seen. No pericardial effusion seen. Normal aortic root dimension. Stress Test: 8-7-18-negative for ischemia    Labs:   CBC:   Recent Labs     06/05/19  0725 06/06/19  0425   WBC 8.6 8.8   HGB 15.0 13.4   HCT 44.2 39.4*    231     BMP:   Recent Labs     06/05/19  0725 06/06/19  0425    139   K 4.2 4.7   CO2 25 24   BUN 19 17   CREATININE 1.20 1.16   LABGLOM >60 >60   GLUCOSE 121* 103*     No results found for: BNP  PT/INR:   Recent Labs     06/05/19 0725   PROTIME 11.3   INR 1.1     APTT:  Recent Labs     06/05/19 0725   APTT 25.9     CARDIAC ENZYMES:  Recent Labs     06/05/19 0725   TROPONINT NOT REPORTED     FASTING LIPID PANEL:  Lab Results   Component Value Date    HDL 24 01/20/2014    TRIG 259 01/20/2014     LIVER PROFILE:  Recent Labs     06/05/19  0725 06/06/19  0425   AST 25 27   ALT 51* 43*   LABALBU 4.5 4.0       ASSESSMENT:  1. Paroxysmal atrial fibrillation, rate 100-130, on Dig, Cardizem gtt, and Lopressor  2. Essential hypertension- BP controlled  3. Mixed hyperlipidemia- on Crestor  4. Hx of PAF with KRISS/CV 2018  5. CHADS-VASC Score:1  6. EF 45%, mild LVH  7. Moderate LA dilatation, Mild RA dilatation           Patient Active Problem List   Diagnosis    Hyperlipidemia    Atrial fibrillation with rapid ventricular response (HCC)    Atrial fibrillation with RVR (HCC)    Essential hypertension    Hypotension due to drugs       PLAN:    1. Pt only received one dose of Xarelto and it has not been re-ordered. Continue ASA    Discussed with patient and nursing.     I have discussed the care of Mr. Miguel Toro, including pertinent history and exam findings with my [address]. I have seen and examined Oli and the

## 2019-06-06 NOTE — PLAN OF CARE
Problem: Falls - Risk of:  Goal: Will remain free from falls  Description  Will remain free from falls  6/5/2019 2038 by Demetra Hewitt RN  Outcome: Ongoing  Note:   Pt fall risk, fall band present, falling star, safety alarm activated and in use as needed. Hourly rounding performed. Pt encouraged to use call light. See Annalisa Zenda fall risk assessment. Problem: Cardiac Output - Decreased:  Goal: Hemodynamic stability will improve  Description  Hemodynamic stability will improve  6/5/2019 2038 by Demetra Hewitt RN  Outcome: Ongoing  Note:   BP WNL. HR irregular ranging from . Telemetry on, atrial fibrillation. Pulses radial pulses equal bilaterally. S1 and S2 sounds present, no adventitious heart sound noted. No report from pt of light headed/dizziness. No SOB. Capillary refill is less than three seconds. Will continue to monitor patient closely. Problem: Activity:  Goal: Physical symptoms of sleep deprivation will improve  Description  Physical symptoms of sleep deprivation will improve  Outcome: Ongoing  Note:   The problem of recurrent sleep distubance is discussed. Avoidance of caffeine sources is strongly encouraged. Room door closed to decrease external stimuli. Curtain pulled to decrease stimuli. Sleep mask and ear plug provided. Sleep hygiene issues are reviewed. The use of melatonin for temporary relief.

## 2019-06-07 ENCOUNTER — ANESTHESIA (OUTPATIENT)
Dept: CARDIAC CATH/INVASIVE PROCEDURES | Age: 49
DRG: 310 | End: 2019-06-07
Payer: COMMERCIAL

## 2019-06-07 ENCOUNTER — ANESTHESIA EVENT (OUTPATIENT)
Dept: CARDIAC CATH/INVASIVE PROCEDURES | Age: 49
DRG: 310 | End: 2019-06-07
Payer: COMMERCIAL

## 2019-06-07 ENCOUNTER — APPOINTMENT (OUTPATIENT)
Dept: CARDIAC CATH/INVASIVE PROCEDURES | Age: 49
DRG: 310 | End: 2019-06-07
Payer: COMMERCIAL

## 2019-06-07 VITALS
BODY MASS INDEX: 33.73 KG/M2 | HEIGHT: 76 IN | RESPIRATION RATE: 25 BRPM | DIASTOLIC BLOOD PRESSURE: 110 MMHG | SYSTOLIC BLOOD PRESSURE: 140 MMHG | TEMPERATURE: 97.4 F | OXYGEN SATURATION: 91 % | HEART RATE: 89 BPM | WEIGHT: 277 LBS

## 2019-06-07 VITALS
SYSTOLIC BLOOD PRESSURE: 131 MMHG | DIASTOLIC BLOOD PRESSURE: 101 MMHG | OXYGEN SATURATION: 91 % | RESPIRATION RATE: 25 BRPM

## 2019-06-07 LAB
LV EF: 60 %
LVEF MODALITY: NORMAL

## 2019-06-07 PROCEDURE — 5A2204Z RESTORATION OF CARDIAC RHYTHM, SINGLE: ICD-10-PCS | Performed by: INTERNAL MEDICINE

## 2019-06-07 PROCEDURE — 7100000000 HC PACU RECOVERY - FIRST 15 MIN

## 2019-06-07 PROCEDURE — 6370000000 HC RX 637 (ALT 250 FOR IP): Performed by: INTERNAL MEDICINE

## 2019-06-07 PROCEDURE — 3700000000 HC ANESTHESIA ATTENDED CARE

## 2019-06-07 PROCEDURE — B24BZZ4 ULTRASONOGRAPHY OF HEART WITH AORTA, TRANSESOPHAGEAL: ICD-10-PCS | Performed by: INTERNAL MEDICINE

## 2019-06-07 PROCEDURE — 99232 SBSQ HOSP IP/OBS MODERATE 35: CPT | Performed by: INTERNAL MEDICINE

## 2019-06-07 PROCEDURE — 6360000002 HC RX W HCPCS: Performed by: NURSE ANESTHETIST, CERTIFIED REGISTERED

## 2019-06-07 PROCEDURE — 3700000001 HC ADD 15 MINUTES (ANESTHESIA)

## 2019-06-07 PROCEDURE — 7100000001 HC PACU RECOVERY - ADDTL 15 MIN

## 2019-06-07 PROCEDURE — 93312 ECHO TRANSESOPHAGEAL: CPT

## 2019-06-07 RX ORDER — METOPROLOL SUCCINATE 50 MG/1
50 TABLET, EXTENDED RELEASE ORAL DAILY
Qty: 30 TABLET | Refills: 3 | Status: ON HOLD | OUTPATIENT
Start: 2019-06-08 | End: 2019-07-31 | Stop reason: HOSPADM

## 2019-06-07 RX ORDER — MIDAZOLAM HYDROCHLORIDE 1 MG/ML
INJECTION INTRAMUSCULAR; INTRAVENOUS PRN
Status: DISCONTINUED | OUTPATIENT
Start: 2019-06-07 | End: 2019-06-07 | Stop reason: SDUPTHER

## 2019-06-07 RX ORDER — DIGOXIN 250 MCG
250 TABLET ORAL DAILY
Qty: 30 TABLET | Refills: 3 | Status: ON HOLD | OUTPATIENT
Start: 2019-06-08 | End: 2019-07-31 | Stop reason: HOSPADM

## 2019-06-07 RX ORDER — DILTIAZEM HYDROCHLORIDE 60 MG/1
60 TABLET, FILM COATED ORAL EVERY 8 HOURS SCHEDULED
Qty: 120 TABLET | Refills: 3 | Status: SHIPPED | OUTPATIENT
Start: 2019-06-07 | End: 2019-06-28 | Stop reason: ALTCHOICE

## 2019-06-07 RX ORDER — SOTALOL HYDROCHLORIDE 120 MG/1
120 TABLET ORAL ONCE
Status: COMPLETED | OUTPATIENT
Start: 2019-06-07 | End: 2019-06-07

## 2019-06-07 RX ORDER — PROPOFOL 10 MG/ML
INJECTION, EMULSION INTRAVENOUS PRN
Status: DISCONTINUED | OUTPATIENT
Start: 2019-06-07 | End: 2019-06-07 | Stop reason: SDUPTHER

## 2019-06-07 RX ADMIN — ASPIRIN 81 MG 81 MG: 81 TABLET ORAL at 08:50

## 2019-06-07 RX ADMIN — SOTALOL HYDROCHLORIDE 120 MG: 120 TABLET ORAL at 11:03

## 2019-06-07 RX ADMIN — PROPOFOL 40 MG: 10 INJECTION, EMULSION INTRAVENOUS at 16:04

## 2019-06-07 RX ADMIN — RIVAROXABAN 20 MG: 20 TABLET, FILM COATED ORAL at 17:58

## 2019-06-07 RX ADMIN — DILTIAZEM HYDROCHLORIDE 60 MG: 60 TABLET, FILM COATED ORAL at 14:13

## 2019-06-07 RX ADMIN — PROPOFOL 40 MG: 10 INJECTION, EMULSION INTRAVENOUS at 16:13

## 2019-06-07 RX ADMIN — DILTIAZEM HYDROCHLORIDE 60 MG: 60 TABLET, FILM COATED ORAL at 05:48

## 2019-06-07 RX ADMIN — PROPOFOL 40 MG: 10 INJECTION, EMULSION INTRAVENOUS at 16:09

## 2019-06-07 RX ADMIN — PROPOFOL 40 MG: 10 INJECTION, EMULSION INTRAVENOUS at 16:11

## 2019-06-07 RX ADMIN — FLUTICASONE PROPIONATE 2 SPRAY: 50 SPRAY, METERED NASAL at 08:54

## 2019-06-07 RX ADMIN — METOPROLOL SUCCINATE 50 MG: 50 TABLET, FILM COATED, EXTENDED RELEASE ORAL at 08:51

## 2019-06-07 RX ADMIN — PROPOFOL 40 MG: 10 INJECTION, EMULSION INTRAVENOUS at 16:06

## 2019-06-07 RX ADMIN — MIDAZOLAM 2 MG: 1 INJECTION INTRAMUSCULAR; INTRAVENOUS at 16:00

## 2019-06-07 RX ADMIN — DIGOXIN 250 MCG: 250 TABLET ORAL at 08:50

## 2019-06-07 ASSESSMENT — PAIN SCALES - GENERAL
PAINLEVEL_OUTOF10: 0

## 2019-06-07 NOTE — PLAN OF CARE
Gait steady demonstrates understanding of safety continues A-fib verbalizes sleeping fair last night

## 2019-06-07 NOTE — PROGRESS NOTES
diltiazem (CARDIZEM) 125 mg in dextrose 5% 125 mL infusion 2.5 mg/hr (19 0603)     PRN Meds: zolpidem, sodium chloride flush, magnesium hydroxide, nicotine, acetaminophen, ondansetron **OR** ondansetron, melatonin    Data:     Past Medical History:   has a past medical history of Atrial fibrillation ([address] Hyperlipidemia, Hypertension, and Skin cancer. Social History:   reports that he has never smoked. He has never used smokeless tobacco. He reports that he drinks about 2.0 oz of alcohol per week. He reports that he does not use drugs. Family History:   Family History   Problem Relation Age of Onset    Alzheimer's Disease Mother        Vitals:  BP (!) 121/99   Pulse 112   Temp 98.8 °F (37.1 °C) (Oral)   Resp 13   Ht 6' 4\" (1.93 m)   Wt 277 lb (125.6 kg)   SpO2 95%   BMI 33.72 kg/m²   Temp (24hrs), Av.8 °F (37.1 °C), Min:98.6 °F (37 °C), Max:99 °F (37.2 °C)    No results for input(s): POCGLU in the last 72 hours. I/O (24Hr):     Intake/Output Summary (Last 24 hours) at 2019 0847  Last data filed at 2019 0551  Gross per 24 hour   Intake 1730 ml   Output --   Net 1730 ml       Labs:    Hematology:  Recent Labs     19  0719   WBC 8.6 8.8   RBC 4.89 4.32   HGB 15.0 13.4   HCT 44.2 39.4*   MCV 90.4 91.2   MCH 30.7 31.0   MCHC 33.9 34.0   RDW 13.0 13.1    231   MPV 9.2 9.4   INR 1.1  --      Chemistry:  Recent Labs     19  0719    139   K 4.2 4.7    104   CO2 25 24   GLUCOSE 121* 103*   BUN 19 17   CREATININE 1.20 1.16   MG 2.2 2.2   ANIONGAP 12 11   LABGLOM >60 >60   GFRAA >60 >60   CALCIUM 9.7 8.9   PROBNP  --  765*   TROPHS 9  --    DIGOXIN <0.3*  --      Recent Labs     19  0725 19  0425   PROT 6.8 6.1*   LABALBU 4.5 4.0   TSH 1.84 1.63   AST 25 27   ALT 51* 43*   ALKPHOS 56 42   BILITOT 0.67 1.11         No results found for: SPECIAL  No results found for: CULTURE    No results found for: POCPH, PHART, PH, POCPCO2, VEE7OLG, PCO2, POCPO2, PO2ART, PO2, POCHCO3, KUM5GCQ, HCO3, NBEA, PBEA, BEART, BE, THGBART, THB, NKP7AVY, ZIHX4CUD, V7UVTBZL, O2SAT, FIO2    Radiology:    Xr Chest Portable    Result Date: 6/5/2019  No acute findings.        Physical Examination:        General appearance:  alert, cooperative and no distress  Mental Status:  oriented to person, place and time and normal affect  Lungs:  clear to auscultation bilaterally, normal effort  Heart:  regular rate and irrgular rhythm   Abdomen:  soft, nontender, nondistended, normal bowel sounds  Extremities:  no edema, redness, tenderness in the calves  Skin:  no gross lesions, rashes, induration    Assessment:        Primary Problem  Atrial fibrillation with RVR [facility])    Active Hospital Problems    Diagnosis Date Noted    Atrial fibrillation with RVR ([facility] [address] [I48.91] 06/05/2019    Essential hypertension [I10] 06/05/2019    Hypotension due to drugs [I95.2] 06/05/2019    Hyperlipidemia [E78.5] 07/08/2013       Plan:        - Cardiology following - possible KRISS/CV today  - Continue cardizem infusion   - Xarelto for anticoagulation  - Continue digoxin, BB  - ECHO completed  - Possible KRISS CV today per cardiology       Braden Mayes MD  6/7/2019  8:47 AM

## 2019-06-07 NOTE — CARE COORDINATION
Discharge Planning    Patient given commercial insurance co pay card to activate for his Xarelto. Pt has been on Xarelto in the past and knows how to activate the co pay card.

## 2019-06-07 NOTE — DISCHARGE INSTR - DIET

## 2019-06-07 NOTE — ANESTHESIA PRE PROCEDURE
Department of Anesthesiology  Preprocedure Note       Name:  Mariola Kim   Age:  44 y.o.  :  1970                                          MRN:  [id number]         Date:  2019      Surgeon: * No surgeons listed *    Procedure: CARDIOVERSION    Medications prior to admission:   Prior to Admission medications    Medication Sig Start Date End Date Taking?  Authorizing Provider   NIFEdipine (NIFEDICAL XL) 60 MG extended release tablet Take 60 mg by mouth daily   Yes Historical Provider, MD   metoprolol succinate (TOPROL XL) 50 MG extended release tablet Take 50 mg by mouth daily   Yes Historical Provider, MD   lisinopril (PRINIVIL;ZESTRIL) 40 MG tablet Take 40 mg by mouth daily    Yes Historical Provider, MD   Multiple Vitamins-Minerals (MULTIVITAMIN ADULT PO) Take 1 tablet by mouth daily    Yes Historical Provider, MD   aspirin 81 MG tablet Take 81 mg by mouth daily   Yes Historical Provider, MD   rosuvastatin (CRESTOR) 10 MG tablet Take 10 mg by mouth daily   Yes Historical Provider, MD       Current medications:    Current Facility-Administered Medications   Medication Dose Route Frequency Provider Last Rate Last Dose    digoxin (LANOXIN) tablet 250 mcg  250 mcg Oral Daily Mary Acosta MD   250 mcg at 19 0850    diltiazem (CARDIZEM) tablet 60 mg  60 mg Oral 3 times per day Mary Acosta MD   60 mg at 19 1413    rivaroxaban (XARELTO) tablet 20 mg  20 mg Oral Dinner Mary Acosta MD   20 mg at 19 1745    zolpidem (AMBIEN) tablet 5 mg  5 mg Oral Nightly PRN Kasi MD Vimal   5 mg at 19    fluticasone (FLONASE) 50 MCG/ACT nasal spray 2 spray  2 spray Each Nostril Daily Kasi MD Vimal   2 spray at 19 0854    diltiazem 125 mg in dextrose 5 % 125 mL infusion  5 mg/hr Intravenous Continuous Mary Acosta MD   Stopped at 19 1414    sodium chloride flush 0.9 % injection 10 mL  10 mL Intravenous 2 times per day Kasi Celis MD        sodium chloride flush 0.9 % injection 10 mL  10 mL Intravenous PRN Osiel Long MD        magnesium hydroxide (MILK OF MAGNESIA) 400 MG/5ML suspension 30 mL  30 mL Oral Daily PRN Osiel Long MD        nicotine (NICODERM CQ) 21 MG/24HR 1 patch  1 patch Transdermal Daily PRN Osiel Long MD        acetaminophen (TYLENOL) tablet 650 mg  650 mg Oral Q4H PRN Osiel Long MD        aspirin chewable tablet 81 mg  81 mg Oral Daily Osiel Long MD   81 mg at 06/07/19 0850    metoprolol succinate (TOPROL XL) extended release tablet 50 mg  50 mg Oral Daily Ramila Sanabria MD   50 mg at 06/07/19 0851    rosuvastatin (CRESTOR) tablet 10 mg  10 mg Oral Nightly Ramila Sanabria MD   10 mg at 06/06/19 2047    ondansetron (ZOFRAN-ODT) disintegrating tablet 4 mg  4 mg Oral Q6H PRN Osiel Long MD        Or    ondansetron [facility]) injection 4 mg  4 mg Intravenous Q6H PRN Osiel Long MD        melatonin tablet 3 mg  3 mg Oral Nightly PRN Osiel Long MD   3 mg at 06/05/19 2117     Facility-Administered Medications Ordered in Other Encounters   Medication Dose Route Frequency Provider Last Rate Last Dose    midazolam (VERSED) injection    PRN Dennis Brazil, APRN - CRNA   2 mg at 06/07/19 1600    propofol injection    PRN Dennis Brazil, APRN - CRNA   40 mg at 06/07/19 1613       Allergies:  No Known Allergies    Problem List:    Patient Active Problem List   Diagnosis Code    Hyperlipidemia E78.5    Atrial fibrillation with rapid ventricular response (HCC) I48.91    Atrial fibrillation with RVR (HCC) I48.91    Essential hypertension I10    Hypotension due to drugs I95.2       Past Medical History:        Diagnosis Date    Atrial fibrillation ([facility] [address]     Hyperlipidemia     Hypertension     Skin cancer        Past Surgical History:        Procedure Laterality Date    KNEE SURGERY         Social History:    Social History     Tobacco Use    Smoking status: Never Smoker    Smokeless tobacco: Never Used Substance Use Topics    Alcohol use: Yes     Alcohol/week: 2.0 oz     Types: 4 drink(s) per week     Comment: socially                                Counseling given: Not Answered      Vital Signs (Current):   Vitals:    06/07/19 1000 06/07/19 1100 06/07/19 1200 06/07/19 1300   BP: (!) 123/91 (!) 137/92 (!) 140/95    Pulse: 105 102 93 82   Resp: 20 21 25 22   Temp:   36.7 °C (98.1 °F)    TempSrc:   Oral    SpO2:       Weight:       Height:                                                  BP Readings from Last 3 Encounters:   06/07/19 (!) 140/95   06/07/19 (!) 129/98   01/08/19 (!) 150/84       NPO Status:                                                                                 BMI:   Wt Readings from Last 3 Encounters:   06/05/19 277 lb (125.6 kg)   01/08/19 274 lb (124.3 kg)   05/31/18 265 lb 14.4 oz (120.6 kg)     Body mass index is 33.72 kg/m². CBC:   Lab Results   Component Value Date    WBC 8.8 06/06/2019    RBC 4.32 06/06/2019    HGB 13.4 06/06/2019    HCT 39.4 06/06/2019    MCV 91.2 06/06/2019    RDW 13.1 06/06/2019     06/06/2019       CMP:   Lab Results   Component Value Date     06/06/2019    K 4.7 06/06/2019     06/06/2019    CO2 24 06/06/2019    BUN 17 06/06/2019    CREATININE 1.16 06/06/2019    GFRAA >60 06/06/2019    LABGLOM >60 06/06/2019    GLUCOSE 103 06/06/2019    PROT 6.1 06/06/2019    CALCIUM 8.9 06/06/2019    BILITOT 1.11 06/06/2019    ALKPHOS 42 06/06/2019    AST 27 06/06/2019    ALT 43 06/06/2019       POC Tests: No results for input(s): POCGLU, POCNA, POCK, POCCL, POCBUN, POCHEMO, POCHCT in the last 72 hours.     Coags:   Lab Results   Component Value Date    PROTIME 11.3 06/05/2019    INR 1.1 06/05/2019    APTT 25.9 06/05/2019       HCG (If Applicable): No results found for: PREGTESTUR, PREGSERUM, HCG, HCGQUANT     ABGs: No results found for: PHART, PO2ART, VCY1CGL, NWI2PWG, BEART, J9GHHINK     Type & Screen (If Applicable):  No results found for: LABABO, [address]    Anesthesia Evaluation  Patient summary reviewed and Nursing notes reviewed  Airway: Mallampati: II  TM distance: >3 FB   Neck ROM: full  Mouth opening: > = 3 FB Dental:          Pulmonary:Negative Pulmonary ROS and normal exam  breath sounds clear to auscultation                             Cardiovascular:  Exercise tolerance: good (>4 METS),   (+) hypertension: moderate,       ECG reviewed  Rhythm: irregular  Rate: normal                    Neuro/Psych:   Negative Neuro/Psych ROS              GI/Hepatic/Renal: Neg GI/Hepatic/Renal ROS            Endo/Other: Negative Endo/Other ROS                    Abdominal:           Vascular: negative vascular ROS. Anesthesia Plan      MAC     ASA 2       Induction: intravenous. Anesthetic plan and risks discussed with patient. Plan discussed with attending.                   Nico Elizabeth, APRN - CRNA   6/7/2019

## 2019-06-07 NOTE — PROCEDURES
[address] [facility],[address]                [address] [place] [address]                            CARDIAC CATHETERIZATION    PATIENT NAME: Joaquin Hickey            :        1970  MED REC NO:   [id number]                             ROOM:       1104  ACCOUNT NO:   [de-identified]                           ADMIT DATE: 2019  PROVIDER:     Joanne Aldana    DATE OF PROCEDURE:  2019    CARDIOVERSION    REASON:  Atrial fibrillation with uncontrolled ventricular rate. POST DIAGNOSIS:  Successful electrocardioversion to normal sinus rhythm. COMPLICATIONS:  None. PROCEDURE:  After obtaining informed consent, the patient was brought to  recovery room and prepped in usual manner. He had transesophageal  echocardiogram, which showed no intracardiac clotting. Subsequently, he  received Propofol and Versed by Anesthesia and 200 synchronized biphasic  joules were delivered and the patient promptly converted to normal sinus  rhythm. Blood pressure and heart rate remained normal.  The procedure  was concluded. CONCLUSION:  Successful electrocardioversion from atrial fibrillation to  normal sinus rhythm. PLAN:  Continue anticoagulation and beta-blocker therapy. The patient  will be transferred back to his room. If he remains stable, he can be  discharged home this evening.   He will follow with his own cardiologist.        Adela Nunez    D: 2019 17:29:17       T: 2019 19:50:22     [email or web address]  Job#: [id number]     Doc#: [id number]    CC:

## 2019-06-07 NOTE — PLAN OF CARE
Problem: Falls - Risk of:  Goal: Will remain free from falls  Description  Will remain free from falls  6/7/2019 0207 by Audie Bird RN  Outcome: Ongoing     Pt. Will remain free from falls immediately during shift. Call light within reach. Surrounding free and clear from clutter. Pt. Encouraged to use call light when ambulating. Will continue to monitor. Problem: Cardiac Output - Decreased:  Goal: Hemodynamic stability will improve  Description  Hemodynamic stability will improve  Outcome: Ongoing     Patient was weaned off of cardizem gtt at 2350. PO cardizem started 6/6 Pm. Patient's HR remains stable in 70s/80s. BP remains stable. RN will continue to monitor.

## 2019-06-07 NOTE — PROGRESS NOTES
[facility]   Section of Cardiology  Progress Note      Date:  6/7/2019  Patient: Kala Denver  Admission:  6/5/2019  7:09 AM  Admit DX: Atrial fibrillation with RVR ([address] [I48.91]  Age:  44 y.o., 1970     LOS: 2 days     Reason for evaluation:   arrhythmia      SUBJECTIVE:     The patient was seen and examined. Notes and labs reviewed. There were not complications over night. Patient's cardiac review of systems: Pt laying in bed. He denies any CP, palpitations, SOB, or dizziness. He is anxious to go home. .  The patient is generally feeling unchanged. OBJECTIVE:    Telemetry: A-fib 's  BP (!) 120/90   Pulse 102   Temp 96.7 °F (35.9 °C) (Oral)   Resp 21   Ht 6' 4\" (1.93 m)   Wt 277 lb (125.6 kg)   SpO2 95%   BMI 33.72 kg/m²     Intake/Output Summary (Last 24 hours) at 6/7/2019 0957  Last data filed at 6/7/2019 0551  Gross per 24 hour   Intake 1730 ml   Output --   Net 1730 ml       EXAM:   CONSTITUTIONAL:  awake, alert, cooperative, no apparent distress, and appears stated age. HEENT: Normal jugular venous pulsations  LUNGS: Good respiratory effort. No for increased work of breathing. On auscultation: clear to auscultation bilaterally  CARDIOVASCULAR:  Normal apical impulse, irregular rate and rhythm, normal S1 and S2, no S3 or S4,   ABDOMEN: Soft, nontender, nondistended. Bowel sounds present. SKIN: Warm and dry. EXTREMITIES: No lower extremity edema. Motor movement grossly intact. No cyanosis or clubbing.     Current Inpatient Medications:   digoxin  250 mcg Oral Daily    diltiazem  60 mg Oral 3 times per day    rivaroxaban  20 mg Oral Dinner    fluticasone  2 spray Each Nostril Daily    sodium chloride flush  10 mL Intravenous 2 times per day    aspirin  81 mg Oral Daily    metoprolol succinate  50 mg Oral Daily    rosuvastatin  10 mg Oral Nightly       IV Infusions (if any):   diltiazem (CARDIZEM) 125 mg in dextrose 5% 125 mL infusion 2.5 mg/hr (06/07/19 4441)       Diagnostics:   ECHO: 6-5-19  Summary  Mild left ventricular hypertrophy  Global left ventricular systolic function is mildly decreased with an  estimated ejection fraction is 45% . Function assessment is difficult due to irregular rhythm  Left atrium is moderately dilated. Right atrial dilatation. No significant valvular regurgitation or stenosis seen. No pericardial effusion seen. Normal aortic root dimension.      Stress Test: 8-7-18-negative for ischemia       Labs:   CBC:   Recent Labs     06/05/19  0725 06/06/19  0425   WBC 8.6 8.8   HGB 15.0 13.4   HCT 44.2 39.4*    231     BMP:   Recent Labs     06/05/19  0725 06/06/19  0425    139   K 4.2 4.7   CO2 25 24   BUN 19 17   CREATININE 1.20 1.16   LABGLOM >60 >60   GLUCOSE 121* 103*     No results found for: BNP  PT/INR:   Recent Labs     06/05/19  0725   PROTIME 11.3   INR 1.1     APTT:  Recent Labs     06/05/19  0725   APTT 25.9     CARDIAC ENZYMES:  Recent Labs     06/05/19  0725   TROPONINT NOT REPORTED     FASTING LIPID PANEL:  Lab Results   Component Value Date    HDL 24 01/20/2014    TRIG 259 01/20/2014     LIVER PROFILE:  Recent Labs     06/05/19 0725 06/06/19  0425   AST 25 27   ALT 51* 43*   LABALBU 4.5 4.0       ASSESSMENT:  1. Paroxysmal atrial fibrillation, rate , on Dig, Cardizem PO, weaning Cardizem gtt, and Toprol  2. Essential hypertension- BP controlled  3. Mixed hyperlipidemia- on Crestor  4. Hx of PAF with KRISS/CV 2018  5. CHADS-VASC Score:1  6. EF 45%, mild LVH  7. Moderate LA dilatation, Mild RA dilatation  8. Negative stress test 8-2018    Patient Active Problem List   Diagnosis    Hyperlipidemia    Atrial fibrillation with rapid ventricular response ([address]    Atrial fibrillation with RVR ([address]    Essential hypertension    Hypotension due to drugs       PLAN:    1. Keep NPO for now    Discussed with patient and nursing.     I have discussed the care of Mr. Diego Rockwell, including pertinent history and exam findings with my Vee Dutton R.N. I have seen and examined Oli and the elements of all parts of the encounter have been performed by me. I agree with the assessment, plan, and orders as documented by Emiliana Meléndez R.N., after I have modified the exam findings, the plan of treatment, and the final version is my approved version of the assessment.     SHAN Solorzano, RN, Edith Mathew M.D.    Electronically signed by Ameena Faye RN on 6/7/2019 at 9:57 AM next time patient seen and examined. Today he was tachycardic. He did not respond well to the treatment I instated yesterday. He is anxious and wants to have cardioversion as done last year. I scheduled to have transesophageal echocardiogram and subsequent cardioversion however I ordered Betapace 120 mg to be given as a single dose for now. He already received anticoagulation. Further recommendations after cardioversion and transesophageal echocardiogram is done. Consents were obtained.

## 2019-06-07 NOTE — ANESTHESIA POSTPROCEDURE EVALUATION
Department of Anesthesiology  Postprocedure Note    Patient: Fany Long  MRN: [id number]  YOB: 1970  Date of evaluation: 6/7/2019  Time:  5:10 PM     Procedure Summary     Date:  06/07/19 Room / Location:  [facility]    Anesthesia Start:  4040 Anesthesia Stop:  1624    Procedure:  CARDIOVERSION Diagnosis:      Scheduled Providers:   Responsible Provider:  Dee Nguyen DO    Anesthesia Type:  MAC ASA Status:  2          Anesthesia Type: No value filed. Adeline Phase I: Adeline Score: 8    Adeline Phase II:      Last vitals: Reviewed and per EMR flowsheets.        Anesthesia Post Evaluation    Patient location during evaluation: PACU  Patient participation: complete - patient participated  Level of consciousness: awake and alert  Airway patency: patent  Nausea & Vomiting: no nausea and no vomiting  Complications: no  Cardiovascular status: hemodynamically stable  Respiratory status: acceptable  Hydration status: stable

## 2019-06-08 NOTE — DISCHARGE SUMMARY
K 4.7 06/06/2019     06/06/2019    CO2 24 06/06/2019    ANIONGAP 11 06/06/2019    BUN 17 06/06/2019    CREATININE 1.16 06/06/2019    BUNCRER 15 06/06/2019    CALCIUM 8.9 06/06/2019    LABGLOM >60 06/06/2019    GFRAA >60 06/06/2019    GFR      06/06/2019    GFR NOT REPORTED 06/06/2019     HFP:    Lab Results   Component Value Date    PROT 6.1 06/06/2019     CMP:    Lab Results   Component Value Date    GLUCOSE 103 06/06/2019     06/06/2019    K 4.7 06/06/2019     06/06/2019    CO2 24 06/06/2019    BUN 17 06/06/2019    CREATININE 1.16 06/06/2019    ANIONGAP 11 06/06/2019    ALKPHOS 42 06/06/2019    ALT 43 06/06/2019    AST 27 06/06/2019    BILITOT 1.11 06/06/2019    LABALBU 4.0 06/06/2019    ALBUMIN NOT REPORTED 06/06/2019    LABGLOM >60 06/06/2019    GFRAA >60 06/06/2019    GFR      06/06/2019    GFR NOT REPORTED 06/06/2019    PROT 6.1 06/06/2019    CALCIUM 8.9 06/06/2019     PT/INR:  No results found for: PTINR  PTT:   Lab Results   Component Value Date    APTT 25.9 06/05/2019     FLP:    Lab Results   Component Value Date    CHOL 103 01/20/2014    CHOL 149 06/27/2013    TRIG 259 01/20/2014    HDL 24 01/20/2014     U/A:    Lab Results   Component Value Date    COLORU YELLOW 12/16/2018    TURBIDITY CLEAR 12/16/2018    SPECGRAV 1.015 12/16/2018    HGBUR SMALL 12/16/2018    PHUR 6.0 12/16/2018    PROTEINU NEGATIVE 12/16/2018    GLUCOSEU NEGATIVE 12/16/2018    KETUA NEGATIVE 12/16/2018    BILIRUBINUR NEGATIVE 12/16/2018    UROBILINOGEN Normal 12/16/2018    NITRU NEGATIVE 12/16/2018    LEUKOCYTESUR NEGATIVE 12/16/2018     TSH:    Lab Results   Component Value Date    TSH 1.63 06/06/2019        Radiology:  Xr Chest Portable    Result Date: 6/5/2019  No acute findings.        Consultations:    Consults:     Final Specialist Recommendations/Findings:   IP CONSULT TO CARDIOLOGY  IP CONSULT TO INTERNAL MEDICINE  IP CONSULT TO SOCIAL WORK      The patient was seen and examined on day of discharge and this discharge summary is in conjunction with any daily progress note from day of discharge. Discharge plan:     Disposition: Home    Physician Follow Up:     MD Festus Live Garrett  [phone]             Requiring Further Evaluation/Follow Up POST HOSPITALIZATION/Incidental Findings:     Diet: regular diet    Activity: As tolerated    Instructions to Patient: follow up with cardiology     Discharge Medications:      Medication List      START taking these medications    diltiazem 60 MG tablet  Commonly known as:  CARDIZEM  Take 1 tablet by mouth every 8 hours     rivaroxaban 20 MG Tabs tablet  Commonly known as:  XARELTO  Take 1 tablet by mouth Daily with supper        CHANGE how you take these medications    digoxin 250 MCG tablet  Commonly known as:  LANOXIN  Take 1 tablet by mouth daily  What changed:    · medication strength  · how much to take     metoprolol succinate 50 MG extended release tablet  Commonly known as:  TOPROL XL  Take 1 tablet by mouth daily  What changed:    · medication strength  · how much to take  · Another medication with the same name was removed. Continue taking this medication, and follow the directions you see here.         CONTINUE taking these medications    aspirin 81 MG tablet     MULTIVITAMIN ADULT PO     rosuvastatin 10 MG tablet  Commonly known as:  CRESTOR        STOP taking these medications    B-12 PO     lisinopril 40 MG tablet  Commonly known as:  PRINIVIL;ZESTRIL     NIFEDICAL XL 60 MG extended release tablet  Generic drug:  NIFEdipine           Where to Get Your Medications      These medications were sent to [facility] [address], [address] [facility] [phone] - F [phone]  [address] [place] [address],  [facility] [address]    Phone:  [phone]   · digoxin 250 MCG tablet  · diltiazem 60 MG tablet  · metoprolol succinate 50 MG extended release tablet  · rivaroxaban 20 MG Tabs tablet Time Spent on discharge is  30 in patient examination, evaluation, counseling as well as medication reconciliation, prescriptions for required medications, discharge plan and follow up. Electronically signed by   Jordan Guzman MD  6/8/2019  7:26 AM      Thank you Dr. Yaritza Parks MD for the opportunity to be involved in this patient's care.

## 2019-06-10 ENCOUNTER — OFFICE VISIT (OUTPATIENT)
Dept: ORTHOPEDIC SURGERY | Age: 49
End: 2019-06-10
Payer: COMMERCIAL

## 2019-06-10 VITALS — BODY MASS INDEX: 31.78 KG/M2 | WEIGHT: 261 LBS | HEIGHT: 76 IN

## 2019-06-10 DIAGNOSIS — M17.12 PRIMARY OSTEOARTHRITIS OF LEFT KNEE: Primary | ICD-10-CM

## 2019-06-10 PROCEDURE — 99203 OFFICE O/P NEW LOW 30 MIN: CPT | Performed by: ORTHOPAEDIC SURGERY

## 2019-06-10 PROCEDURE — 1036F TOBACCO NON-USER: CPT | Performed by: ORTHOPAEDIC SURGERY

## 2019-06-10 PROCEDURE — 20611 DRAIN/INJ JOINT/BURSA W/US: CPT | Performed by: ORTHOPAEDIC SURGERY

## 2019-06-10 PROCEDURE — G8417 CALC BMI ABV UP PARAM F/U: HCPCS | Performed by: ORTHOPAEDIC SURGERY

## 2019-06-10 PROCEDURE — 1111F DSCHRG MED/CURRENT MED MERGE: CPT | Performed by: ORTHOPAEDIC SURGERY

## 2019-06-10 PROCEDURE — 3017F COLORECTAL CA SCREEN DOC REV: CPT | Performed by: ORTHOPAEDIC SURGERY

## 2019-06-10 PROCEDURE — G8427 DOCREV CUR MEDS BY ELIG CLIN: HCPCS | Performed by: ORTHOPAEDIC SURGERY

## 2019-06-10 RX ORDER — LIDOCAINE HYDROCHLORIDE 10 MG/ML
4 INJECTION, SOLUTION INFILTRATION; PERINEURAL ONCE
Status: COMPLETED | OUTPATIENT
Start: 2019-06-10 | End: 2019-06-11

## 2019-06-10 RX ORDER — METHYLPREDNISOLONE ACETATE 40 MG/ML
40 INJECTION, SUSPENSION INTRA-ARTICULAR; INTRALESIONAL; INTRAMUSCULAR; SOFT TISSUE ONCE
Status: COMPLETED | OUTPATIENT
Start: 2019-06-10 | End: 2019-06-11

## 2019-06-10 ASSESSMENT — ENCOUNTER SYMPTOMS
NAUSEA: 0
SHORTNESS OF BREATH: 0
RESPIRATORY NEGATIVE: 1
COLOR CHANGE: 0
VOMITING: 0
APNEA: 0
DIARRHEA: 0
COUGH: 0
CONSTIPATION: 0
ABDOMINAL DISTENTION: 0
CHEST TIGHTNESS: 0
ABDOMINAL PAIN: 0

## 2019-06-10 NOTE — PROGRESS NOTES
[facility] AND SPORTS MEDICINE  [address] [address]  Dept: [phone]  Dept Fax: [phone]          Left Knee - New Patient     Subjective:     Chief Complaint   Patient presents with    Knee Pain     LT knee pain since 1993. Multiple aspirations, cortisone injection, Meniscus repair 1993 and bakers cyst and scrape was 2015. Last cortisone 7/2017. Taking Tylenol for pain. HPI:     Marquise Coronado presents today for Left knee pain. The pain has been present for years but has gotten worse over the last 2-3 months. The patient recalls a no specific injury. The patient has tried ice, Tylenol, and brace with mild improvement. The pain is now described as Stabbing  and Sharp . There is  pain on weight bearing. The knee has not swelled. There is not painful popping and clicking. The knee has caught or locked up. The knee has not given out. It is  stiff upon arising from sitting. It is  painful to go up and down stairs and sit for a prolonged time. The patient has had a cortisone injection with the last being in July of 2017 with good relief. The patient has not tried a lubrication injection. The patient has tried physical therapy after surgery. The patient has had surgery x2- Jewel Lizette 11/93 and Fernando 10/15- debridement. The opposite knee is  Okay. He states he just got out of the hospital due to his Afib. Review of Systems   Constitutional: Positive for activity change. Negative for appetite change, fatigue and fever. Respiratory: Negative. Negative for apnea, cough, chest tightness and shortness of breath. Cardiovascular: Negative. Negative for chest pain, palpitations and leg swelling. Gastrointestinal: Negative for abdominal distention, abdominal pain, constipation, diarrhea, nausea and vomiting. Genitourinary: Negative for difficulty urinating, dysuria and hematuria.    Musculoskeletal: Positive for arthralgias and gait problem. Negative for joint swelling and myalgias. Skin: Negative for color change and rash. Neurological: Negative for dizziness, weakness, numbness and headaches. Psychiatric/Behavioral: Positive for sleep disturbance. Past Medical History:    Past Medical History:   Diagnosis Date    Atrial fibrillation ([address]     Hyperlipidemia     Hypertension     Skin cancer        Past Surgical History:    Past Surgical History:   Procedure Laterality Date    CARDIOVERSION  06/07/2019    KNEE SURGERY      TRANSESOPHAGEAL ECHOCARDIOGRAM  06/07/2019       CurrentMedications:   Current Outpatient Medications   Medication Sig Dispense Refill    rivaroxaban (XARELTO) 20 MG TABS tablet Take 1 tablet by mouth Daily with supper 30 tablet 1    metoprolol succinate (TOPROL XL) 50 MG extended release tablet Take 1 tablet by mouth daily 30 tablet 3    diltiazem (CARDIZEM) 60 MG tablet Take 1 tablet by mouth every 8 hours 120 tablet 3    digoxin (LANOXIN) 250 MCG tablet Take 1 tablet by mouth daily 30 tablet 3    Multiple Vitamins-Minerals (MULTIVITAMIN ADULT PO) Take 1 tablet by mouth daily       aspirin 81 MG tablet Take 81 mg by mouth daily      rosuvastatin (CRESTOR) 10 MG tablet Take 10 mg by mouth daily       No current facility-administered medications for this visit. Allergies:    Patient has no known allergies. Social History:   Social History     Socioeconomic History    Marital status: Single     Spouse name: None    Number of children: None    Years of education: None    Highest education level: None   Occupational History    None   Social Needs    Financial resource strain: None    Food insecurity:     Worry: None     Inability: None    Transportation needs:     Medical: None     Non-medical: None   Tobacco Use    Smoking status: Never Smoker    Smokeless tobacco: Never Used   Substance and Sexual Activity    Alcohol use:  Yes     Alcohol/week: 2.0 oz Types: 4 drink(s) per week     Comment: socially    Drug use: No    Sexual activity: None   Lifestyle    Physical activity:     Days per week: None     Minutes per session: None    Stress: None   Relationships    Social connections:     Talks on phone: None     Gets together: None     Attends Hinduism service: None     Active member of club or organization: None     Attends meetings of clubs or organizations: None     Relationship status: None    Intimate partner violence:     Fear of current or ex partner: None     Emotionally abused: None     Physically abused: None     Forced sexual activity: None   Other Topics Concern    None   Social History Narrative    None       Family History:  Family History   Problem Relation Age of Onset    Alzheimer's Disease Mother        I have reviewed the CC, HPI, ROS, PMH, FHX, Social History, and if not present in this note, I have reviewed in the patient's chart. I agree with the documentation provided by other staff and have reviewed their documentation prior to providing my signature indicating agreement. Vitals:   Ht 6' 4\" (1.93 m)   Wt 261 lb (118.4 kg)   BMI 31.77 kg/m²  Body mass index is 31.77 kg/m². Physical Examination:     Orthopedics:    GENERAL: Alert and oriented X3 in no acute distress. SKIN: Intact without lesions or ulcerations. NEURO: Musculoskeletal and axillary nerves intact to sensory and motor testing. VASC: Capillary refill is less than 3 seconds. Left Knee     GEN:  Alert and oriented X 3, in no acute distress. GAIT:  The patient's gait was observed while entering the exam room and was noted to be antalgic. The extremity is in anatomic alignment. SKIN:  Intact without rashes, lesions, or ulcerations. No obvious deformity or swelling. NEURO:  The patient responds to light touch throughout bilateral LE. Patellar and Achilles reflexes are 2/4. VASC:  The bilateral LE is neurovascularly intact with  2/4 DP and  2/4 PT pulses. Brisk capillary refill. ROM: 0/115.  no knee effusion   MUSC:   good quad tone  LIGAMENT:  Lachman's test is Negative with Good endpoint. Anterior drawer Negative. Posterior drawer Negative. There is  No varus instability at 0 degrees and No varus instability at 30 degrees. There is No valgus instability at 0 degrees and No valgus instability at 30 degrees. SPECIAL:  German test is positive for pain with hyperflexion and crepitation  PALP: There is Lateral joint line pain    Assessment:     1. Primary osteoarthritis of left knee        Procedures:    Procedure yes    Regular Knee Injection    Location: Left knee  Procedure: Corticosteroid injection into the knee. the patient was placed in the supine position on the exam table. The superior lateral portal was identified and marked. the skin was prepped with betadine in a sterile fashion. Utilizing ultrasound for precise placement and clean technique with sterile gloves a 5cc solution containing 4cc of 1.0% Lidocaine with 1cc containing 40mg of Depo-medrol was injected. There was no resistance to the injection. The wound was cleansed and a band-aid was placed. the patient tolerated the procedure without difficulty. Adverse reactions to the injection were discussed with the patient including signs of infection (increasing pain, redness, swelling) and the patient was instructed to call immediately if experiencing any of these symptoms. Radiology:   Xr Knee Left (min 4 Views)    Result Date: 6/11/2019  KNEE X-RAY 4 views of the left knee including AP, bilateral tunnel, and lateral in the upright position, and skyline views reveal slight varus alignment with no fracture or dislocation. Kellgren grade 3 changes of osteoarthritis (joint space narrowing, osteophyte, subchondral sclerosis, bony deformity/cyst) of the tri compartment(s). No osseous loose bodies. No bony erosion or periosteal reaction. No soft tissue masses.   Calcification within the femoral metaphysis. Impression: moderate to severe osteoarthritis of the left knee. Plan:   Treatment : I reviewed the X-ray with the patient and I informed them that the patient has moderate OA . We discussed the etiologies and natural histories of primary osteoarthritis. We discussed the various treatment alternatives including anti-inflammatory medications, physical therapy, injections, further imaging studies and as a last result surgery. During today's visit, we discussed the options of doing a third knee arthroscopy versus conservative treatment including cortisone injections, bracing, etc.  Eventually the only thing that will fix the patient's knee is a left total knee replacement. The patient is not interested in having a total knee replacement at this time and would like to continue conservative therapies especially since his last injection lasted almost 2 years. the patient has opted for a left knee cortisone injection. Patient should return to the clinic as needed to follow up with  Bob Lundberg PA-C, ATC. The patient will call the office immediately with any problems. Orders Placed This Encounter   Medications    lidocaine 1 % injection 4 mL    methylPREDNISolone acetate (DEPO-MEDROL) injection 40 mg       Orders Placed This Encounter   Procedures    XR KNEE LEFT (MIN 4 VIEWS)     Standing Status:   Future     Number of Occurrences:   1     Standing Expiration Date:   6/10/2020     Order Specific Question:   Reason for exam:     Answer:   pain    SD ARTHROCENTESIS ASPIR&/INJ MAJOR JT/BURSA W/US       Bbo ARNOLD PA-C, ATC, am scribing for and in the presence of Wilkins Buerger D.O.. 6/16/2019  12:10 PM      I, Wilkins Buerger DO, have personally seen this patient, reviewed the chart including history, and imaging if done. I personally  performed the physical exam and obtained any needed additional history.  I placed orders, performed or supervised procedures and developed the treatment

## 2019-06-11 RX ADMIN — LIDOCAINE HYDROCHLORIDE 4 ML: 10 INJECTION, SOLUTION INFILTRATION; PERINEURAL at 08:13

## 2019-06-11 RX ADMIN — METHYLPREDNISOLONE ACETATE 40 MG: 40 INJECTION, SUSPENSION INTRA-ARTICULAR; INTRALESIONAL; INTRAMUSCULAR; SOFT TISSUE at 08:13

## 2019-06-28 RX ORDER — DILTIAZEM HYDROCHLORIDE 240 MG/1
240 CAPSULE, COATED, EXTENDED RELEASE ORAL DAILY
COMMUNITY
End: 2021-11-15

## 2019-06-28 RX ORDER — PROPAFENONE HYDROCHLORIDE 225 MG/1
225 CAPSULE, EXTENDED RELEASE ORAL 2 TIMES DAILY
COMMUNITY
End: 2021-11-15

## 2019-07-01 ENCOUNTER — HOSPITAL ENCOUNTER (OUTPATIENT)
Dept: CARDIAC CATH/INVASIVE PROCEDURES | Age: 49
Discharge: HOME | End: 2019-07-01
Attending: INTERNAL MEDICINE | Admitting: INTERNAL MEDICINE
Payer: COMMERCIAL

## 2019-07-01 ENCOUNTER — ANESTHESIA EVENT (OUTPATIENT)
Dept: CARDIAC CATH/INVASIVE PROCEDURES | Age: 49
End: 2019-07-01
Payer: COMMERCIAL

## 2019-07-01 ENCOUNTER — ANESTHESIA (OUTPATIENT)
Dept: CARDIAC CATH/INVASIVE PROCEDURES | Age: 49
End: 2019-07-01
Payer: COMMERCIAL

## 2019-07-01 VITALS
HEIGHT: 76 IN | DIASTOLIC BLOOD PRESSURE: 81 MMHG | SYSTOLIC BLOOD PRESSURE: 136 MMHG | OXYGEN SATURATION: 95 % | BODY MASS INDEX: 32.63 KG/M2 | HEART RATE: 65 BPM | RESPIRATION RATE: 16 BRPM | WEIGHT: 268 LBS | TEMPERATURE: 97.2 F

## 2019-07-01 VITALS — OXYGEN SATURATION: 93 % | DIASTOLIC BLOOD PRESSURE: 101 MMHG | SYSTOLIC BLOOD PRESSURE: 141 MMHG

## 2019-07-01 PROCEDURE — 7100000010 HC PHASE II RECOVERY - FIRST 15 MIN

## 2019-07-01 PROCEDURE — 6360000002 HC RX W HCPCS: Performed by: NURSE ANESTHETIST, CERTIFIED REGISTERED

## 2019-07-01 PROCEDURE — 3700000000 HC ANESTHESIA ATTENDED CARE

## 2019-07-01 PROCEDURE — 7100000011 HC PHASE II RECOVERY - ADDTL 15 MIN

## 2019-07-01 PROCEDURE — 2580000003 HC RX 258: Performed by: INTERNAL MEDICINE

## 2019-07-01 PROCEDURE — 92960 CARDIOVERSION ELECTRIC EXT: CPT | Performed by: INTERNAL MEDICINE

## 2019-07-01 PROCEDURE — 2580000003 HC RX 258: Performed by: NURSE ANESTHETIST, CERTIFIED REGISTERED

## 2019-07-01 PROCEDURE — 93005 ELECTROCARDIOGRAM TRACING: CPT | Performed by: INTERNAL MEDICINE

## 2019-07-01 PROCEDURE — 3700000001 HC ADD 15 MINUTES (ANESTHESIA)

## 2019-07-01 RX ORDER — ACETAMINOPHEN 500 MG
500 TABLET ORAL EVERY 6 HOURS PRN
COMMUNITY

## 2019-07-01 RX ORDER — SODIUM CHLORIDE 9 MG/ML
INJECTION, SOLUTION INTRAVENOUS CONTINUOUS PRN
Status: DISCONTINUED | OUTPATIENT
Start: 2019-07-01 | End: 2019-07-01 | Stop reason: SDUPTHER

## 2019-07-01 RX ORDER — SODIUM CHLORIDE 9 MG/ML
INJECTION, SOLUTION INTRAVENOUS CONTINUOUS
Status: DISCONTINUED | OUTPATIENT
Start: 2019-07-01 | End: 2019-07-01 | Stop reason: HOSPADM

## 2019-07-01 RX ORDER — PROPOFOL 10 MG/ML
INJECTION, EMULSION INTRAVENOUS PRN
Status: DISCONTINUED | OUTPATIENT
Start: 2019-07-01 | End: 2019-07-01 | Stop reason: SDUPTHER

## 2019-07-01 RX ADMIN — SODIUM CHLORIDE: 9 INJECTION, SOLUTION INTRAVENOUS at 08:54

## 2019-07-01 RX ADMIN — SODIUM CHLORIDE: 9 INJECTION, SOLUTION INTRAVENOUS at 08:03

## 2019-07-01 RX ADMIN — PROPOFOL 90 MG: 10 INJECTION, EMULSION INTRAVENOUS at 09:00

## 2019-07-01 ASSESSMENT — PULMONARY FUNCTION TESTS
PIF_VALUE: 0
PIF_VALUE: 1
PIF_VALUE: 0
PIF_VALUE: 1
PIF_VALUE: 0
PIF_VALUE: 1
PIF_VALUE: 0
PIF_VALUE: 1
PIF_VALUE: 0
PIF_VALUE: 1
PIF_VALUE: 0
PIF_VALUE: 0
PIF_VALUE: 1
PIF_VALUE: 0
PIF_VALUE: 1
PIF_VALUE: 0
PIF_VALUE: 1
PIF_VALUE: 0
PIF_VALUE: 1
PIF_VALUE: 1
PIF_VALUE: 0
PIF_VALUE: 1
PIF_VALUE: 0
PIF_VALUE: 1
PIF_VALUE: 0
PIF_VALUE: 3
PIF_VALUE: 30
PIF_VALUE: 0
PIF_VALUE: 27
PIF_VALUE: 1
PIF_VALUE: 1
PIF_VALUE: 0
PIF_VALUE: 0
PIF_VALUE: 1
PIF_VALUE: 0
PIF_VALUE: 1
PIF_VALUE: 1
PIF_VALUE: 0
PIF_VALUE: 0
PIF_VALUE: 1

## 2019-07-01 ASSESSMENT — PAIN SCALES - GENERAL
PAINLEVEL_OUTOF10: 0
PAINLEVEL_OUTOF10: 0

## 2019-07-01 NOTE — ANESTHESIA PRE PROCEDURE
Diagnosis Date    Arthritis     Atrial [address] ([facility] [address]     Hyperlipidemia     Hypertension     Skin cancer        Past Surgical History:        Procedure Laterality Date    CARDIOVERSION  06/07/2019    CARDIOVERSION  07/01/2019    by Dr. Venancio Sullivan. 1 shock @ 200 converted to NSR    KNEE SURGERY      MOHS SURGERY      1st stage head and neck    TRANSESOPHAGEAL ECHOCARDIOGRAM  06/07/2019       Social History:    Social History     Tobacco Use    Smoking status: Never Smoker    Smokeless tobacco: Never Used   Substance Use Topics    Alcohol use: Yes     Alcohol/week: 2.0 oz     Types: 4 drink(s) per week     Comment: socially                                Counseling given: Not Answered      Vital Signs (Current):   Vitals:    07/01/19 0906 07/01/19 0915 07/01/19 0930 07/01/19 0945   BP: 129/88 129/82 136/81    Pulse: 66 64 63 65   Resp: 19 12 17 16   Temp: 97.2 °F (36.2 °C)      TempSrc: Temporal      SpO2: 97% 98% 95% 95%   Weight:       Height:                                                  BP Readings from Last 3 Encounters:   07/01/19 136/81   07/01/19 (!) 141/101   06/07/19 (!) 140/110       NPO Status: Time of last liquid consumption: 2359                        Time of last solid consumption: 1900                        Date of last liquid consumption: 06/30/19                        Date of last solid food consumption: 06/30/19    BMI:   Wt Readings from Last 3 Encounters:   07/01/19 268 lb (121.6 kg)   06/10/19 261 lb (118.4 kg)   06/05/19 277 lb (125.6 kg)     Body mass index is 32.62 kg/m².     CBC:   Lab Results   Component Value Date    WBC 8.8 06/06/2019    RBC 4.32 06/06/2019    HGB 13.4 06/06/2019    HCT 39.4 06/06/2019    MCV 91.2 06/06/2019    RDW 13.1 06/06/2019     06/06/2019       CMP:   Lab Results   Component Value Date     06/06/2019    K 4.7 06/06/2019     06/06/2019    CO2 24 06/06/2019    BUN 17 06/06/2019    CREATININE 1.16 06/06/2019    GFRAA >60

## 2019-07-01 NOTE — ANESTHESIA POSTPROCEDURE EVALUATION
Department of Anesthesiology  Postprocedure Note    Patient: Reagan Hernandez  MRN: [id number]  YOB: 1970  Date of evaluation: 7/1/2019  Time:  6:55 PM     Procedure Summary     Date:  07/01/19 Room / Location:  [facility]    Anesthesia Start:  8322 Anesthesia Stop:  7112    Procedure:  CARDIOVERSION Diagnosis:      Scheduled Providers:   Responsible Provider:  Soheila Russell DO    Anesthesia Type:  MAC ASA Status:  3          Anesthesia Type: MAC    Adeline Phase I: Adeline Score: 10    Adeline Phase II: Adeline Score: 10    Last vitals: Reviewed and per EMR flowsheets.        Anesthesia Post Evaluation    Patient location during evaluation: PACU  Patient participation: complete - patient participated  Level of consciousness: awake and alert  Airway patency: patent  Nausea & Vomiting: no nausea and no vomiting  Complications: no  Cardiovascular status: hemodynamically stable  Respiratory status: acceptable  Hydration status: stable

## 2019-07-02 LAB
EKG ATRIAL RATE: 71 BPM
EKG Q-T INTERVAL: 374 MS
EKG QRS DURATION: 84 MS
EKG QTC CALCULATION (BAZETT): 450 MS
EKG R AXIS: 17 DEGREES
EKG T AXIS: -42 DEGREES
EKG VENTRICULAR RATE: 87 BPM

## 2019-07-29 ENCOUNTER — ANESTHESIA EVENT (OUTPATIENT)
Dept: CARDIAC CATH/INVASIVE PROCEDURES | Age: 49
End: 2019-07-29

## 2019-07-30 ENCOUNTER — ANESTHESIA (OUTPATIENT)
Dept: CARDIAC CATH/INVASIVE PROCEDURES | Age: 49
End: 2019-07-30

## 2019-07-30 ENCOUNTER — HOSPITAL ENCOUNTER (INPATIENT)
Dept: CARDIAC CATH/INVASIVE PROCEDURES | Age: 49
LOS: 1 days | Discharge: HOME | DRG: 274 | End: 2019-07-31
Attending: INTERNAL MEDICINE | Admitting: INTERNAL MEDICINE
Payer: COMMERCIAL

## 2019-07-30 VITALS — OXYGEN SATURATION: 98 % | SYSTOLIC BLOOD PRESSURE: 105 MMHG | DIASTOLIC BLOOD PRESSURE: 80 MMHG | TEMPERATURE: 95.3 F

## 2019-07-30 DIAGNOSIS — Z98.890 S/P ABLATION OF ATRIAL FIBRILLATION: ICD-10-CM

## 2019-07-30 DIAGNOSIS — Z86.79 S/P ABLATION OF ATRIAL FIBRILLATION: ICD-10-CM

## 2019-07-30 PROBLEM — I48.91 ATRIAL FIBRILLATION (HCC): Status: ACTIVE | Noted: 2019-07-30

## 2019-07-30 LAB
ACTIVATED CLOTTING TIME: 284 SEC (ref 79–149)
ACTIVATED CLOTTING TIME: 288 SEC (ref 79–149)
ACTIVATED CLOTTING TIME: 308 SEC (ref 79–149)
ACTIVATED CLOTTING TIME: 348 SEC (ref 79–149)
EKG ATRIAL RATE: 159 BPM
EKG Q-T INTERVAL: 372 MS
EKG QRS DURATION: 84 MS
EKG QTC CALCULATION (BAZETT): 437 MS
EKG R AXIS: 22 DEGREES
EKG T AXIS: -34 DEGREES
EKG VENTRICULAR RATE: 83 BPM
LV EF: 55 %
LVEF MODALITY: NORMAL

## 2019-07-30 PROCEDURE — 7100000011 HC PHASE II RECOVERY - ADDTL 15 MIN

## 2019-07-30 PROCEDURE — 93662 INTRACARDIAC ECG (ICE): CPT | Performed by: INTERNAL MEDICINE

## 2019-07-30 PROCEDURE — 93613 INTRACARDIAC EPHYS 3D MAPG: CPT | Performed by: INTERNAL MEDICINE

## 2019-07-30 PROCEDURE — 2500000003 HC RX 250 WO HCPCS

## 2019-07-30 PROCEDURE — C1759 CATH, INTRA ECHOCARDIOGRAPHY: HCPCS

## 2019-07-30 PROCEDURE — 3700000000 HC ANESTHESIA ATTENDED CARE

## 2019-07-30 PROCEDURE — 2060000000 HC ICU INTERMEDIATE R&B

## 2019-07-30 PROCEDURE — 93005 ELECTROCARDIOGRAM TRACING: CPT | Performed by: INTERNAL MEDICINE

## 2019-07-30 PROCEDURE — 2580000003 HC RX 258: Performed by: INTERNAL MEDICINE

## 2019-07-30 PROCEDURE — 02K83ZZ MAP CONDUCTION MECHANISM, PERCUTANEOUS APPROACH: ICD-10-PCS | Performed by: INTERNAL MEDICINE

## 2019-07-30 PROCEDURE — 2500000003 HC RX 250 WO HCPCS: Performed by: NURSE ANESTHETIST, CERTIFIED REGISTERED

## 2019-07-30 PROCEDURE — 93312 ECHO TRANSESOPHAGEAL: CPT

## 2019-07-30 PROCEDURE — 93312 ECHO TRANSESOPHAGEAL: CPT | Performed by: INTERNAL MEDICINE

## 2019-07-30 PROCEDURE — 4A023FZ MEASUREMENT OF CARDIAC RHYTHM, PERCUTANEOUS APPROACH: ICD-10-PCS | Performed by: INTERNAL MEDICINE

## 2019-07-30 PROCEDURE — 93325 DOPPLER ECHO COLOR FLOW MAPG: CPT

## 2019-07-30 PROCEDURE — C1731 CATH, EP, 20 OR MORE ELEC: HCPCS

## 2019-07-30 PROCEDURE — 6370000000 HC RX 637 (ALT 250 FOR IP): Performed by: INTERNAL MEDICINE

## 2019-07-30 PROCEDURE — 6360000004 HC RX CONTRAST MEDICATION

## 2019-07-30 PROCEDURE — 4A0234Z MEASUREMENT OF CARDIAC ELECTRICAL ACTIVITY, PERCUTANEOUS APPROACH: ICD-10-PCS | Performed by: INTERNAL MEDICINE

## 2019-07-30 PROCEDURE — C1764 EVENT RECORDER, CARDIAC: HCPCS

## 2019-07-30 PROCEDURE — C1894 INTRO/SHEATH, NON-LASER: HCPCS

## 2019-07-30 PROCEDURE — 3700000001 HC ADD 15 MINUTES (ANESTHESIA)

## 2019-07-30 PROCEDURE — 02583ZZ DESTRUCTION OF CONDUCTION MECHANISM, PERCUTANEOUS APPROACH: ICD-10-PCS | Performed by: INTERNAL MEDICINE

## 2019-07-30 PROCEDURE — C1730 CATH, EP, 19 OR FEW ELECT: HCPCS

## 2019-07-30 PROCEDURE — 0JH632Z INSERTION OF MONITORING DEVICE INTO CHEST SUBCUTANEOUS TISSUE AND FASCIA, PERCUTANEOUS APPROACH: ICD-10-PCS | Performed by: INTERNAL MEDICINE

## 2019-07-30 PROCEDURE — 7100000010 HC PHASE II RECOVERY - FIRST 15 MIN

## 2019-07-30 PROCEDURE — 2580000003 HC RX 258: Performed by: NURSE ANESTHETIST, CERTIFIED REGISTERED

## 2019-07-30 PROCEDURE — 2720000010 HC SURG SUPPLY STERILE

## 2019-07-30 PROCEDURE — 93656 COMPRE EP EVAL ABLTJ ATR FIB: CPT | Performed by: INTERNAL MEDICINE

## 2019-07-30 PROCEDURE — C1769 GUIDE WIRE: HCPCS

## 2019-07-30 PROCEDURE — 85347 COAGULATION TIME ACTIVATED: CPT

## 2019-07-30 PROCEDURE — 2709999900 HC NON-CHARGEABLE SUPPLY

## 2019-07-30 PROCEDURE — 94760 N-INVAS EAR/PLS OXIMETRY 1: CPT

## 2019-07-30 PROCEDURE — 93325 DOPPLER ECHO COLOR FLOW MAPG: CPT | Performed by: INTERNAL MEDICINE

## 2019-07-30 PROCEDURE — 6360000002 HC RX W HCPCS: Performed by: NURSE ANESTHETIST, CERTIFIED REGISTERED

## 2019-07-30 PROCEDURE — C1733 CATH, EP, OTHR THAN COOL-TIP: HCPCS

## 2019-07-30 PROCEDURE — G0378 HOSPITAL OBSERVATION PER HR: HCPCS

## 2019-07-30 PROCEDURE — 6360000002 HC RX W HCPCS

## 2019-07-30 PROCEDURE — 33285 INSJ SUBQ CAR RHYTHM MNTR: CPT | Performed by: INTERNAL MEDICINE

## 2019-07-30 RX ORDER — ROCURONIUM BROMIDE 10 MG/ML
INJECTION, SOLUTION INTRAVENOUS PRN
Status: DISCONTINUED | OUTPATIENT
Start: 2019-07-30 | End: 2019-07-30 | Stop reason: SDUPTHER

## 2019-07-30 RX ORDER — FENTANYL CITRATE 50 UG/ML
INJECTION, SOLUTION INTRAMUSCULAR; INTRAVENOUS PRN
Status: DISCONTINUED | OUTPATIENT
Start: 2019-07-30 | End: 2019-07-30 | Stop reason: SDUPTHER

## 2019-07-30 RX ORDER — DIGOXIN 250 MCG
250 TABLET ORAL DAILY
Status: DISCONTINUED | OUTPATIENT
Start: 2019-07-30 | End: 2019-07-31 | Stop reason: HOSPADM

## 2019-07-30 RX ORDER — PROPOFOL 10 MG/ML
INJECTION, EMULSION INTRAVENOUS PRN
Status: DISCONTINUED | OUTPATIENT
Start: 2019-07-30 | End: 2019-07-30 | Stop reason: SDUPTHER

## 2019-07-30 RX ORDER — ONDANSETRON 2 MG/ML
INJECTION INTRAMUSCULAR; INTRAVENOUS PRN
Status: DISCONTINUED | OUTPATIENT
Start: 2019-07-30 | End: 2019-07-30 | Stop reason: SDUPTHER

## 2019-07-30 RX ORDER — OXYCODONE HYDROCHLORIDE AND ACETAMINOPHEN 5; 325 MG/1; MG/1
1 TABLET ORAL EVERY 4 HOURS PRN
Status: DISCONTINUED | OUTPATIENT
Start: 2019-07-30 | End: 2019-07-31 | Stop reason: HOSPADM

## 2019-07-30 RX ORDER — PROTAMINE SULFATE 10 MG/ML
INJECTION, SOLUTION INTRAVENOUS PRN
Status: DISCONTINUED | OUTPATIENT
Start: 2019-07-30 | End: 2019-07-30 | Stop reason: SDUPTHER

## 2019-07-30 RX ORDER — ASPIRIN 81 MG/1
81 TABLET, CHEWABLE ORAL DAILY
Status: DISCONTINUED | OUTPATIENT
Start: 2019-07-30 | End: 2019-07-31 | Stop reason: HOSPADM

## 2019-07-30 RX ORDER — GLYCOPYRROLATE 1 MG/5 ML
SYRINGE (ML) INTRAVENOUS PRN
Status: DISCONTINUED | OUTPATIENT
Start: 2019-07-30 | End: 2019-07-30 | Stop reason: SDUPTHER

## 2019-07-30 RX ORDER — PHENYLEPHRINE HCL IN 0.9% NACL 0.5 MG/5ML
SYRINGE (ML) INTRAVENOUS PRN
Status: DISCONTINUED | OUTPATIENT
Start: 2019-07-30 | End: 2019-07-30 | Stop reason: SDUPTHER

## 2019-07-30 RX ORDER — SODIUM CHLORIDE 0.9 % (FLUSH) 0.9 %
10 SYRINGE (ML) INJECTION PRN
Status: DISCONTINUED | OUTPATIENT
Start: 2019-07-30 | End: 2019-07-31 | Stop reason: HOSPADM

## 2019-07-30 RX ORDER — SODIUM CHLORIDE 0.9 % (FLUSH) 0.9 %
10 SYRINGE (ML) INJECTION EVERY 12 HOURS SCHEDULED
Status: DISCONTINUED | OUTPATIENT
Start: 2019-07-30 | End: 2019-07-31 | Stop reason: HOSPADM

## 2019-07-30 RX ORDER — PROPAFENONE HYDROCHLORIDE 225 MG/1
225 CAPSULE, EXTENDED RELEASE ORAL 2 TIMES DAILY
Status: DISCONTINUED | OUTPATIENT
Start: 2019-07-30 | End: 2019-07-31 | Stop reason: HOSPADM

## 2019-07-30 RX ORDER — ACETAMINOPHEN 325 MG/1
650 TABLET ORAL EVERY 4 HOURS PRN
Status: DISCONTINUED | OUTPATIENT
Start: 2019-07-30 | End: 2019-07-31 | Stop reason: HOSPADM

## 2019-07-30 RX ORDER — LIDOCAINE HYDROCHLORIDE 10 MG/ML
INJECTION, SOLUTION EPIDURAL; INFILTRATION; INTRACAUDAL; PERINEURAL PRN
Status: DISCONTINUED | OUTPATIENT
Start: 2019-07-30 | End: 2019-07-30 | Stop reason: SDUPTHER

## 2019-07-30 RX ORDER — NEOSTIGMINE METHYLSULFATE 5 MG/5 ML
SYRINGE (ML) INTRAVENOUS PRN
Status: DISCONTINUED | OUTPATIENT
Start: 2019-07-30 | End: 2019-07-30 | Stop reason: SDUPTHER

## 2019-07-30 RX ORDER — DEXAMETHASONE SODIUM PHOSPHATE 4 MG/ML
INJECTION, SOLUTION INTRA-ARTICULAR; INTRALESIONAL; INTRAMUSCULAR; INTRAVENOUS; SOFT TISSUE PRN
Status: DISCONTINUED | OUTPATIENT
Start: 2019-07-30 | End: 2019-07-30 | Stop reason: SDUPTHER

## 2019-07-30 RX ORDER — HEPARIN SODIUM 1000 [USP'U]/ML
INJECTION, SOLUTION INTRAVENOUS; SUBCUTANEOUS PRN
Status: DISCONTINUED | OUTPATIENT
Start: 2019-07-30 | End: 2019-07-30 | Stop reason: SDUPTHER

## 2019-07-30 RX ORDER — ROSUVASTATIN CALCIUM 10 MG/1
10 TABLET, COATED ORAL DAILY
Status: DISCONTINUED | OUTPATIENT
Start: 2019-07-30 | End: 2019-07-31 | Stop reason: HOSPADM

## 2019-07-30 RX ORDER — SODIUM CHLORIDE 9 MG/ML
INJECTION, SOLUTION INTRAVENOUS CONTINUOUS
Status: DISCONTINUED | OUTPATIENT
Start: 2019-07-30 | End: 2019-07-31 | Stop reason: HOSPADM

## 2019-07-30 RX ADMIN — OXYCODONE HYDROCHLORIDE AND ACETAMINOPHEN 1 TABLET: 5; 325 TABLET ORAL at 18:39

## 2019-07-30 RX ADMIN — Medication 100 MCG: at 10:02

## 2019-07-30 RX ADMIN — PROTAMINE SULFATE 10 MG: 10 INJECTION, SOLUTION INTRAVENOUS at 11:34

## 2019-07-30 RX ADMIN — PROTAMINE SULFATE 90 MG: 10 INJECTION, SOLUTION INTRAVENOUS at 11:36

## 2019-07-30 RX ADMIN — Medication 100 MCG: at 10:32

## 2019-07-30 RX ADMIN — Medication 100 MCG: at 11:06

## 2019-07-30 RX ADMIN — Medication 3 MG: at 11:43

## 2019-07-30 RX ADMIN — SODIUM CHLORIDE: 9 INJECTION, SOLUTION INTRAVENOUS at 11:00

## 2019-07-30 RX ADMIN — Medication 50 MCG: at 10:25

## 2019-07-30 RX ADMIN — Medication 10 ML: at 20:45

## 2019-07-30 RX ADMIN — Medication 0.6 MG: at 11:42

## 2019-07-30 RX ADMIN — HEPARIN SODIUM 15000 UNITS: 1000 INJECTION INTRAVENOUS; SUBCUTANEOUS at 10:00

## 2019-07-30 RX ADMIN — FENTANYL CITRATE 50 MCG: 50 INJECTION INTRAMUSCULAR; INTRAVENOUS at 09:22

## 2019-07-30 RX ADMIN — PROPAFENONE HYDROCHLORIDE 225 MG: 225 CAPSULE, EXTENDED RELEASE ORAL at 16:03

## 2019-07-30 RX ADMIN — PROPAFENONE HYDROCHLORIDE 225 MG: 225 CAPSULE, EXTENDED RELEASE ORAL at 20:44

## 2019-07-30 RX ADMIN — PROPOFOL 100 MG: 10 INJECTION, EMULSION INTRAVENOUS at 10:29

## 2019-07-30 RX ADMIN — Medication 100 MCG: at 10:31

## 2019-07-30 RX ADMIN — SODIUM CHLORIDE: 9 INJECTION, SOLUTION INTRAVENOUS at 09:20

## 2019-07-30 RX ADMIN — Medication 100 MCG: at 09:57

## 2019-07-30 RX ADMIN — FENTANYL CITRATE 50 MCG: 50 INJECTION INTRAMUSCULAR; INTRAVENOUS at 10:39

## 2019-07-30 RX ADMIN — DIGOXIN 250 MCG: 250 TABLET ORAL at 17:38

## 2019-07-30 RX ADMIN — PHENYLEPHRINE HYDROCHLORIDE 50 MCG/MIN: 10 INJECTION INTRAVENOUS at 10:20

## 2019-07-30 RX ADMIN — DEXAMETHASONE SODIUM PHOSPHATE 4 MG: 4 INJECTION, SOLUTION INTRAMUSCULAR; INTRAVENOUS at 09:35

## 2019-07-30 RX ADMIN — Medication 100 MCG: at 10:13

## 2019-07-30 RX ADMIN — SODIUM CHLORIDE: 9 INJECTION, SOLUTION INTRAVENOUS at 18:15

## 2019-07-30 RX ADMIN — Medication 100 MCG: at 10:29

## 2019-07-30 RX ADMIN — HEPARIN SODIUM 2000 UNITS: 1000 INJECTION INTRAVENOUS; SUBCUTANEOUS at 11:06

## 2019-07-30 RX ADMIN — SODIUM CHLORIDE: 9 INJECTION, SOLUTION INTRAVENOUS at 07:42

## 2019-07-30 RX ADMIN — FENTANYL CITRATE 50 MCG: 50 INJECTION INTRAMUSCULAR; INTRAVENOUS at 10:54

## 2019-07-30 RX ADMIN — Medication 100 MCG: at 11:21

## 2019-07-30 RX ADMIN — FENTANYL CITRATE 50 MCG: 50 INJECTION INTRAMUSCULAR; INTRAVENOUS at 09:15

## 2019-07-30 RX ADMIN — ROSUVASTATIN CALCIUM 10 MG: 10 TABLET, COATED ORAL at 16:03

## 2019-07-30 RX ADMIN — HEPARIN SODIUM 5000 UNITS: 1000 INJECTION INTRAVENOUS; SUBCUTANEOUS at 10:17

## 2019-07-30 RX ADMIN — ASPIRIN 81 MG: 81 TABLET, CHEWABLE ORAL at 17:38

## 2019-07-30 RX ADMIN — Medication 100 MCG: at 09:48

## 2019-07-30 RX ADMIN — ROCURONIUM BROMIDE 50 MG: 10 INJECTION INTRAVENOUS at 09:22

## 2019-07-30 RX ADMIN — PROPOFOL 200 MG: 10 INJECTION, EMULSION INTRAVENOUS at 09:22

## 2019-07-30 RX ADMIN — ONDANSETRON 4 MG: 2 INJECTION, SOLUTION INTRAMUSCULAR; INTRAVENOUS at 11:44

## 2019-07-30 RX ADMIN — Medication 100 MCG: at 11:18

## 2019-07-30 RX ADMIN — Medication 100 MCG: at 10:30

## 2019-07-30 RX ADMIN — RIVAROXABAN 20 MG: 20 TABLET, FILM COATED ORAL at 18:40

## 2019-07-30 RX ADMIN — OXYCODONE HYDROCHLORIDE AND ACETAMINOPHEN 1 TABLET: 5; 325 TABLET ORAL at 14:29

## 2019-07-30 RX ADMIN — Medication 100 MCG: at 11:30

## 2019-07-30 RX ADMIN — Medication 100 MCG: at 10:20

## 2019-07-30 RX ADMIN — LIDOCAINE HYDROCHLORIDE 50 MG: 10 INJECTION, SOLUTION EPIDURAL; INFILTRATION; INTRACAUDAL at 09:22

## 2019-07-30 ASSESSMENT — PULMONARY FUNCTION TESTS
PIF_VALUE: 19
PIF_VALUE: 0
PIF_VALUE: 19
PIF_VALUE: 18
PIF_VALUE: 19
PIF_VALUE: 19
PIF_VALUE: 2
PIF_VALUE: 10
PIF_VALUE: 19
PIF_VALUE: 19
PIF_VALUE: 0
PIF_VALUE: 19
PIF_VALUE: 20
PIF_VALUE: 19
PIF_VALUE: 19
PIF_VALUE: 18
PIF_VALUE: 15
PIF_VALUE: 15
PIF_VALUE: 0
PIF_VALUE: 18
PIF_VALUE: 19
PIF_VALUE: 10
PIF_VALUE: 20
PIF_VALUE: 1
PIF_VALUE: 18
PIF_VALUE: 18
PIF_VALUE: 19
PIF_VALUE: 14
PIF_VALUE: 1
PIF_VALUE: 5
PIF_VALUE: 16
PIF_VALUE: 21
PIF_VALUE: 10
PIF_VALUE: 20
PIF_VALUE: 19
PIF_VALUE: 16
PIF_VALUE: 0
PIF_VALUE: 19
PIF_VALUE: 18
PIF_VALUE: 20
PIF_VALUE: 19
PIF_VALUE: 26
PIF_VALUE: 19
PIF_VALUE: 15
PIF_VALUE: 18
PIF_VALUE: 19
PIF_VALUE: 1
PIF_VALUE: 27
PIF_VALUE: 0
PIF_VALUE: 38
PIF_VALUE: 19
PIF_VALUE: 0
PIF_VALUE: 15
PIF_VALUE: 19
PIF_VALUE: 18
PIF_VALUE: 36
PIF_VALUE: 19
PIF_VALUE: 18
PIF_VALUE: 19
PIF_VALUE: 19
PIF_VALUE: 10
PIF_VALUE: 5
PIF_VALUE: 20
PIF_VALUE: 18
PIF_VALUE: 28
PIF_VALUE: 19
PIF_VALUE: 19
PIF_VALUE: 24
PIF_VALUE: 3
PIF_VALUE: 19
PIF_VALUE: 20
PIF_VALUE: 20
PIF_VALUE: 19
PIF_VALUE: 15
PIF_VALUE: 15
PIF_VALUE: 19
PIF_VALUE: 20
PIF_VALUE: 10
PIF_VALUE: 16
PIF_VALUE: 1
PIF_VALUE: 19
PIF_VALUE: 20
PIF_VALUE: 20
PIF_VALUE: 16
PIF_VALUE: 18
PIF_VALUE: 19
PIF_VALUE: 16
PIF_VALUE: 1
PIF_VALUE: 19
PIF_VALUE: 1
PIF_VALUE: 11
PIF_VALUE: 18
PIF_VALUE: 21
PIF_VALUE: 1
PIF_VALUE: 19
PIF_VALUE: 23
PIF_VALUE: 20
PIF_VALUE: 18
PIF_VALUE: 19
PIF_VALUE: 0
PIF_VALUE: 2
PIF_VALUE: 20
PIF_VALUE: 3
PIF_VALUE: 19
PIF_VALUE: 15
PIF_VALUE: 16
PIF_VALUE: 2
PIF_VALUE: 20
PIF_VALUE: 1
PIF_VALUE: 24
PIF_VALUE: 1
PIF_VALUE: 18
PIF_VALUE: 19
PIF_VALUE: 17
PIF_VALUE: 19
PIF_VALUE: 20
PIF_VALUE: 19
PIF_VALUE: 0
PIF_VALUE: 19
PIF_VALUE: 16
PIF_VALUE: 25
PIF_VALUE: 20
PIF_VALUE: 16
PIF_VALUE: 19
PIF_VALUE: 20
PIF_VALUE: 19
PIF_VALUE: 19
PIF_VALUE: 7
PIF_VALUE: 20
PIF_VALUE: 18
PIF_VALUE: 14
PIF_VALUE: 19
PIF_VALUE: 16
PIF_VALUE: 19
PIF_VALUE: 19
PIF_VALUE: 20
PIF_VALUE: 1
PIF_VALUE: 20
PIF_VALUE: 0
PIF_VALUE: 19
PIF_VALUE: 18
PIF_VALUE: 19
PIF_VALUE: 19
PIF_VALUE: 15
PIF_VALUE: 19
PIF_VALUE: 19
PIF_VALUE: 10
PIF_VALUE: 19
PIF_VALUE: 18
PIF_VALUE: 0
PIF_VALUE: 19
PIF_VALUE: 18
PIF_VALUE: 20
PIF_VALUE: 1
PIF_VALUE: 18
PIF_VALUE: 0
PIF_VALUE: 1
PIF_VALUE: 16
PIF_VALUE: 19
PIF_VALUE: 0
PIF_VALUE: 18
PIF_VALUE: 20
PIF_VALUE: 19
PIF_VALUE: 20
PIF_VALUE: 19
PIF_VALUE: 20
PIF_VALUE: 20
PIF_VALUE: 19
PIF_VALUE: 19
PIF_VALUE: 1
PIF_VALUE: 19
PIF_VALUE: 20
PIF_VALUE: 18
PIF_VALUE: 1
PIF_VALUE: 0
PIF_VALUE: 3
PIF_VALUE: 19
PIF_VALUE: 19
PIF_VALUE: 1

## 2019-07-30 ASSESSMENT — PAIN SCALES - GENERAL
PAINLEVEL_OUTOF10: 5
PAINLEVEL_OUTOF10: 2
PAINLEVEL_OUTOF10: 0
PAINLEVEL_OUTOF10: 5
PAINLEVEL_OUTOF10: 4

## 2019-07-30 ASSESSMENT — PAIN DESCRIPTION - ORIENTATION
ORIENTATION: LEFT;ANTERIOR
ORIENTATION: LEFT;ANTERIOR

## 2019-07-30 ASSESSMENT — PAIN DESCRIPTION - PAIN TYPE
TYPE: SURGICAL PAIN

## 2019-07-30 ASSESSMENT — PAIN DESCRIPTION - FREQUENCY: FREQUENCY: CONTINUOUS

## 2019-07-30 ASSESSMENT — PAIN DESCRIPTION - LOCATION
LOCATION: CHEST
LOCATION: CHEST

## 2019-07-30 ASSESSMENT — PAIN DESCRIPTION - DESCRIPTORS: DESCRIPTORS: ACHING

## 2019-07-30 NOTE — ANESTHESIA PRE PROCEDURE
Value Date    WBC 8.8 06/06/2019    RBC 4.32 06/06/2019    HGB 13.4 06/06/2019    HCT 39.4 06/06/2019    MCV 91.2 06/06/2019    RDW 13.1 06/06/2019     06/06/2019       CMP:   Lab Results   Component Value Date     06/06/2019    K 4.7 06/06/2019     06/06/2019    CO2 24 06/06/2019    BUN 17 06/06/2019    CREATININE 1.16 06/06/2019    GFRAA >60 06/06/2019    LABGLOM >60 06/06/2019    GLUCOSE 103 06/06/2019    PROT 6.1 06/06/2019    CALCIUM 8.9 06/06/2019    BILITOT 1.11 06/06/2019    ALKPHOS 42 06/06/2019    AST 27 06/06/2019    ALT 43 06/06/2019       POC Tests: No results for input(s): POCGLU, POCNA, POCK, POCCL, POCBUN, POCHEMO, POCHCT in the last 72 hours. Coags:   Lab Results   Component Value Date    PROTIME 11.3 06/05/2019    INR 1.1 06/05/2019    APTT 25.9 06/05/2019       HCG (If Applicable): No results found for: PREGTESTUR, PREGSERUM, HCG, HCGQUANT     ABGs: No results found for: PHART, PO2ART, JZK8KUE, RCI3UFL, BEART, G1ARMHZX     Type & Screen (If Applicable):  No results found for: LABABO, [address]    Anesthesia Evaluation  Patient summary reviewed no history of anesthetic complications:   Airway: Mallampati: III  TM distance: >3 FB   Neck ROM: full  Mouth opening: > = 3 FB Dental: normal exam         Pulmonary:Negative Pulmonary ROS and normal exam                              ROS comment: occasional SOB   Cardiovascular:    (+) hypertension: no interval change, dysrhythmias: atrial fibrillation, hyperlipidemia      ECG reviewed  Rhythm: irregular  Rate: normal  Echocardiogram reviewed         Beta Blocker:  Not on Beta Blocker         Neuro/Psych:   Negative Neuro/Psych ROS              GI/Hepatic/Renal:             Endo/Other:                     Abdominal:           Vascular: negative vascular ROS. Anesthesia Plan      general     ASA 4       Induction: intravenous. arterial line    Anesthetic plan and risks discussed with patient.       Plan discussed with CRNA. Mild left ventricular hypertrophy  Global left ventricular systolic function is mildly decreased with an  estimated ejection fraction is 45% . Function assessment is difficult due to irregular rhythm  Left atrium is moderately dilated. Right atrial dilatation. No significant valvular regurgitation or stenosis seen. No pericardial effusion seen. Normal aortic root dimension.     Yarely Rodríguez MD   7/30/2019

## 2019-07-30 NOTE — ANESTHESIA POSTPROCEDURE EVALUATION
Department of Anesthesiology  Postprocedure Note    Patient: Christopher Erazo  MRN: [id number]  YOB: 1970  Date of evaluation: 7/30/2019  Time:  1:28 PM     Procedure Summary     Date:  07/30/19 Room / Location:  [facility] Cath Lab    Anesthesia Start:  5502 Anesthesia Stop:  1219    Procedure:  KRISS A-FIB ABLATION W/ANES Diagnosis:             S/P ablation of atrial fibrillation      Atrial fibrillation ([facility] [address]    Scheduled Providers:  Lazarus Ropes, MD Responsible Provider:  Lazarus Ropes, MD    Anesthesia Type:  general ASA Status:  4          Anesthesia Type: general    Adeline Phase I:      Adeline Phase II:      Last vitals: Reviewed and per EMR flowsheets.        Anesthesia Post Evaluation    Patient location during evaluation: PACU  Patient participation: complete - patient participated  Level of consciousness: awake and alert  Pain score: 2  Airway patency: patent  Nausea & Vomiting: no nausea and no vomiting  Complications: no  Cardiovascular status: hemodynamically stable  Respiratory status: acceptable, nasal cannula and room air  Hydration status: stable

## 2019-07-30 NOTE — CARE COORDINATION
Case Management Initial Discharge Plan  Garth Tafoya,             Met with:patient to discuss discharge plans. Information verified: address, contacts, phone number, , insurance Yes  PCP: Taco Vidales MD  Date of last visit:     Insurance Provider: MMO    Discharge Planning    Living Arrangements:  Spouse/Significant Other   Support Systems:  Spouse/Significant Other    Home has 2 stories  1 stairs to climb to get into front door, 1 flight stairs to climb to reach second floor  Location of bedroom/bathroom in home main    Patient able to perform ADL's:Independent    Current Services (outpatient & in home) none  DME equipment: none  DME provider: none    Pharmacy: [facility]   Potential Assistance Purchasing Medications:  No  Does patient want to participate in local refill/ meds to beds program?  Yes    Potential Assistance Needed:  N/A    Patient agreeable to home care: No  [place] of choice provided:  n/a    Prior SNF/Rehab Placement and Facility: none  Agreeable to SNF/Rehab: No  [place] of choice provided: n/a   Evaluation: n/a    Expected Discharge date:     Patient expects to be discharged to: Follow Up Appointment: Best Day/ Time:      Transportation provider: wife  Transportation arrangements needed for discharge: No    Readmission Risk              Risk of Unplanned Readmission:        9             Does patient have a readmission risk score greater than 14?: No  If yes, follow-up appointment must be made within 7 days of discharge.      Discharge Plan: home independent          Electronically signed by Pinky Acevedo RN on 19 at 5:26 PM

## 2019-07-30 NOTE — PROGRESS NOTES
Report called to [facility] per writer. Patient assessment unchanged from before.

## 2019-07-31 VITALS
BODY MASS INDEX: 32.27 KG/M2 | TEMPERATURE: 98.4 F | HEART RATE: 94 BPM | DIASTOLIC BLOOD PRESSURE: 94 MMHG | OXYGEN SATURATION: 93 % | HEIGHT: 76 IN | WEIGHT: 265 LBS | RESPIRATION RATE: 18 BRPM | SYSTOLIC BLOOD PRESSURE: 159 MMHG

## 2019-07-31 LAB
HCT VFR BLD CALC: 40.2 % (ref 40.7–50.3)
HEMOGLOBIN: 14.1 G/DL (ref 13–17)
MCH RBC QN AUTO: 31.7 PG (ref 25.2–33.5)
MCHC RBC AUTO-ENTMCNC: 35.1 G/DL (ref 28.4–34.8)
MCV RBC AUTO: 90.3 FL (ref 82.6–102.9)
NRBC AUTOMATED: 0 PER 100 WBC
PDW BLD-RTO: 13.2 % (ref 11.8–14.4)
PLATELET # BLD: 212 K/UL (ref 138–453)
PMV BLD AUTO: 9.2 FL (ref 8.1–13.5)
RBC # BLD: 4.45 M/UL (ref 4.21–5.77)
WBC # BLD: 17.7 K/UL (ref 3.5–11.3)

## 2019-07-31 PROCEDURE — 85027 COMPLETE CBC AUTOMATED: CPT

## 2019-07-31 PROCEDURE — 94760 N-INVAS EAR/PLS OXIMETRY 1: CPT

## 2019-07-31 PROCEDURE — 36415 COLL VENOUS BLD VENIPUNCTURE: CPT

## 2019-07-31 PROCEDURE — G0378 HOSPITAL OBSERVATION PER HR: HCPCS

## 2019-07-31 PROCEDURE — 6370000000 HC RX 637 (ALT 250 FOR IP): Performed by: INTERNAL MEDICINE

## 2019-07-31 RX ADMIN — PROPAFENONE HYDROCHLORIDE 225 MG: 225 CAPSULE, EXTENDED RELEASE ORAL at 09:11

## 2019-07-31 RX ADMIN — OXYCODONE HYDROCHLORIDE AND ACETAMINOPHEN 1 TABLET: 5; 325 TABLET ORAL at 10:09

## 2019-07-31 RX ADMIN — ASPIRIN 81 MG: 81 TABLET, CHEWABLE ORAL at 09:11

## 2019-07-31 RX ADMIN — OXYCODONE HYDROCHLORIDE AND ACETAMINOPHEN 1 TABLET: 5; 325 TABLET ORAL at 00:39

## 2019-07-31 ASSESSMENT — PAIN DESCRIPTION - PAIN TYPE
TYPE: SURGICAL PAIN
TYPE: SURGICAL PAIN

## 2019-07-31 ASSESSMENT — PAIN DESCRIPTION - ORIENTATION
ORIENTATION: LEFT;ANTERIOR
ORIENTATION: LEFT;ANTERIOR

## 2019-07-31 ASSESSMENT — PAIN SCALES - GENERAL
PAINLEVEL_OUTOF10: 0
PAINLEVEL_OUTOF10: 5
PAINLEVEL_OUTOF10: 4
PAINLEVEL_OUTOF10: 0

## 2019-07-31 ASSESSMENT — PAIN DESCRIPTION - LOCATION
LOCATION: CHEST
LOCATION: CHEST

## 2019-07-31 ASSESSMENT — PAIN DESCRIPTION - DESCRIPTORS: DESCRIPTORS: ACHING

## 2019-07-31 NOTE — DISCHARGE SUMMARY
Discharge Summary      Patient ID:  Genia Tinoco  44 y.o.  1970    Admission Date: 7/30/2019     Discharge Date:  7/31/19    Reason for Admission:   Active Problems:    Atrial fibrillation ([address]    S/P ablation of atrial fibrillation  Resolved Problems:    * No resolved hospital problems. *                Discharge diagnosis. Procedures: Cryoablation of atrial fibrillation      Brief Summary of Patient's Course:  Patient presented for elective ablation for drug refractory atrial fibrillation. He underwent cryoablation of his pulmonary veins with posterior wall isolation. He was cardioverted to sinus rhythm and has remained in rhythm. He has no overnight  Complications. Physical exam  Chest-clear  cvs-s1s2 no murmurs  Abd. Soft bs present  Ext. No edema    Discharge Instructions:   Call md with fever,chills or dysphagia. Activity Limitations:  No heavy lifting. Diet:  common adult     CBC:   Recent Labs     07/31/19  0621   WBC 17.7*   HGB 14.1   HCT 40.2*   MCV 90.3         BMP:No results for input(s): NA, K, CL, CO2, PHOS, BUN, CREATININE in the last 72 hours. Invalid input(s): CA   PT/INR: No results for input(s): PROTIME, INR in the last 72 hours. APTT: No results for input(s): APTT in the last 72 hours. MAG: No results for input(s): MG in the last 72 hours. D Dimer: No results for input(s): DDIMER in the last 72 hours. Troponin I No results for input(s): TROPONINI in the last 72 hours. BNP No results for input(s): BNP in the last 72 hours.     Discharge Medications        [facility] Medication Instructions APW:[id number]    Printed on:07/31/19 0818   Medication Information                      acetaminophen (TYLENOL) 500 MG tablet  Take 500 mg by mouth every 6 hours as needed for Pain             aspirin 81 MG tablet  Take 81 mg by mouth daily             diltiazem (CARDIZEM CD) 240 MG extended release capsule  Take 240 mg by mouth daily

## 2019-08-01 LAB
EKG ATRIAL RATE: 84 BPM
EKG P AXIS: 29 DEGREES
EKG P-R INTERVAL: 176 MS
EKG Q-T INTERVAL: 350 MS
EKG QRS DURATION: 84 MS
EKG QTC CALCULATION (BAZETT): 413 MS
EKG R AXIS: 14 DEGREES
EKG T AXIS: -8 DEGREES
EKG VENTRICULAR RATE: 84 BPM

## 2019-12-02 ENCOUNTER — HOSPITAL ENCOUNTER (OUTPATIENT)
Age: 49
Setting detail: SPECIMEN
Discharge: HOME | End: 2019-12-02
Payer: COMMERCIAL

## 2019-12-05 LAB — SURGICAL PATHOLOGY REPORT: NORMAL

## 2020-09-28 ENCOUNTER — HOSPITAL ENCOUNTER (OUTPATIENT)
Dept: GENERAL RADIOLOGY | Facility: CLINIC | Age: 50
Discharge: HOME | End: 2020-09-30
Payer: COMMERCIAL

## 2020-09-28 ENCOUNTER — HOSPITAL ENCOUNTER (OUTPATIENT)
Facility: CLINIC | Age: 50
Discharge: HOME | End: 2020-09-30
Payer: COMMERCIAL

## 2020-09-28 PROCEDURE — 72100 X-RAY EXAM L-S SPINE 2/3 VWS: CPT

## 2020-10-19 ENCOUNTER — OFFICE VISIT (OUTPATIENT)
Dept: ORTHOPEDIC SURGERY | Age: 50
End: 2020-10-19
Payer: COMMERCIAL

## 2020-10-19 VITALS
DIASTOLIC BLOOD PRESSURE: 104 MMHG | BODY MASS INDEX: 32.27 KG/M2 | WEIGHT: 265 LBS | HEART RATE: 88 BPM | TEMPERATURE: 97.2 F | SYSTOLIC BLOOD PRESSURE: 172 MMHG | HEIGHT: 76 IN

## 2020-10-19 PROCEDURE — 20611 DRAIN/INJ JOINT/BURSA W/US: CPT | Performed by: PHYSICIAN ASSISTANT

## 2020-10-19 PROCEDURE — 99213 OFFICE O/P EST LOW 20 MIN: CPT | Performed by: PHYSICIAN ASSISTANT

## 2020-10-19 RX ORDER — METHYLPREDNISOLONE ACETATE 40 MG/ML
40 INJECTION, SUSPENSION INTRA-ARTICULAR; INTRALESIONAL; INTRAMUSCULAR; SOFT TISSUE ONCE
Status: COMPLETED | OUTPATIENT
Start: 2020-10-19 | End: 2020-10-19

## 2020-10-19 RX ORDER — LIDOCAINE HYDROCHLORIDE 10 MG/ML
4 INJECTION, SOLUTION INFILTRATION; PERINEURAL ONCE
Status: COMPLETED | OUTPATIENT
Start: 2020-10-19 | End: 2020-10-19

## 2020-10-19 RX ADMIN — METHYLPREDNISOLONE ACETATE 40 MG: 40 INJECTION, SUSPENSION INTRA-ARTICULAR; INTRALESIONAL; INTRAMUSCULAR; SOFT TISSUE at 15:12

## 2020-10-19 RX ADMIN — LIDOCAINE HYDROCHLORIDE 4 ML: 10 INJECTION, SOLUTION INFILTRATION; PERINEURAL at 15:12

## 2020-10-19 ASSESSMENT — ENCOUNTER SYMPTOMS
SHORTNESS OF BREATH: 0
DIARRHEA: 0
ABDOMINAL DISTENTION: 0
NAUSEA: 0
VOMITING: 0
COUGH: 0
COLOR CHANGE: 0
CHEST TIGHTNESS: 0
ABDOMINAL PAIN: 0
APNEA: 0
CONSTIPATION: 0

## 2020-10-19 NOTE — PROGRESS NOTES
Negative for gait problem, joint swelling and myalgias. Skin: Negative for color change and rash. Neurological: Negative for dizziness, weakness, numbness and headaches. Psychiatric/Behavioral: Negative for sleep disturbance. Past Medical History:    Past Medical History:   Diagnosis Date    Arthritis     Atrial fibrillation ([address]     Hyperlipidemia     Hypertension     Skin cancer        Past Surgical History:    Past Surgical History:   Procedure Laterality Date    CARDIOVERSION  06/07/2019    CARDIOVERSION  07/01/2019    by Dr. Emely Johnson. 1 shock @ 200 converted to NSR    KNEE SURGERY      MOHS SURGERY      1st stage head and neck    TRANSESOPHAGEAL ECHOCARDIOGRAM  06/07/2019       CurrentMedications:   Current Outpatient Medications   Medication Sig Dispense Refill    amLODIPine (NORVASC) 5 MG tablet Take 5 mg by mouth daily      hydroCHLOROthiazide (HYDRODIURIL) 12.5 MG tablet Take 12.5 mg by mouth daily      acetaminophen (TYLENOL) 500 MG tablet Take 500 mg by mouth every 6 hours as needed for Pain      propafenone (RYTHMOL SR) 225 MG extended release capsule Take 225 mg by mouth 2 times daily      diltiazem (CARDIZEM CD) 240 MG extended release capsule Take 240 mg by mouth daily      rivaroxaban (XARELTO) 20 MG TABS tablet Take 1 tablet by mouth Daily with supper 30 tablet 1    Multiple Vitamins-Minerals (MULTIVITAMIN ADULT PO) Take 1 tablet by mouth daily       aspirin 81 MG tablet Take 81 mg by mouth daily      rosuvastatin (CRESTOR) 10 MG tablet Take 10 mg by mouth daily       No current facility-administered medications for this visit. Allergies:    Patient has no known allergies.     Social History:   Social History     Socioeconomic History    Marital status: Single     Spouse name: None    Number of children: None    Years of education: None    Highest education level: None   Occupational History    None   Social Needs    Financial resource strain: None    Food DP and 2/4 PT pulses. Brisk capillary refill. ROM: 0/117 degrees. There is mild effusion. Pain with hyperflexion. MUSC: Good quad tone. LIGAMENT: Lachman's test is Negative with Good endpoint. Anterior drawer Negative. Posterior drawer Negative. There is No varus instability at 0 degrees and No varus instability at 30 degrees. There is 1+ valgus instability at 0 degrees and No valgus instability at 30 degrees. SPECIAL: German test is negative with no clunks, no crepitation, and no pain. PALP: There is no joint line pain. Assessment:     1. Primary osteoarthritis of right knee      Procedures:    Procedure: yes    Regular Knee Injection    Location: Right Knee  Procedure: Corticosteroid injection into the knee. The patient was placed in the Supine position on the exam table. The superior lateral portal was identified and marked. The skin was prepped with betadine in a sterile fashion. Utilizing ultrasound for precise placement and clean technique with sterile gloves a 5cc solution containing 4cc of 1.0% Lidocaine with 1cc containing 40mg of Depo-medrol  was injected. There was no resistance to the injection. The wound was cleansed and a band-aid was placed. the patient tolerated the procedure without difficulty. Adverse reactions to the injection were discussed with the patient including signs of infection (increasing pain, redness, swelling) and the patient was instructed to call immediately if experiencing any of these symptoms. Radiology:   X-rays taken today were reviewed with the patient. KNEE X-RAY    4 views of the right knee including AP, bilateral tunnel, and lateral in the upright position, and skyline views reveal anatomic alignment with no fracture or dislocation. Kellgren grade II changes of osteoarthritis (joint space narrowing, osteophyte, subchondral sclerosis, bony deformity/cyst) of the tri compartment(s). No osseous loose bodies. No bony erosion or periosteal reaction.  No soft patient will call the office immediately with any problems. No orders of the defined types were placed in this encounter. Orders Placed This Encounter   Procedures    XR KNEE RIGHT (MIN 4 VIEWS)     Standing Status:   Future     Number of Occurrences:   1     Standing Expiration Date:   10/19/2021       Beau ARNOLD MS, AT, ATC, am scribing for and in the presence of Issac Elizabeth PA-C. 10/19/2020  11:48 AM        Electronically signed by Beau Boles ATC, on 10/19/2020 at 11:48 AM       IIssac PA-C, have personally seen this patient, reviewed the chart including history, and imaging if done. I personally  performed the physical exam and obtained any needed additional history. I placed orders, performed or supervised procedures and developed the treatment plan.     Electronically signed by Eren Crowley PA-C, on 10/20/2020 at 11:26 AM

## 2020-10-19 NOTE — PATIENT INSTRUCTIONS
CORTISONE INJECTION CARE    The injection site should never get red, hot, or swollen and if it does the patient will contact our office right away. The patient may experience a increase in soreness the first 24-48 hours due to a cortisone flair and can take anti-inflammatories for a short period of time to reduce that soreness. The patient should not submerge the injection site in water for a minimum of 24 hours to avoid infection. This means no lakes, pools, ponds, or hot tubs for 24 hours. If the patient is diabetic the injection may increase their blood sugar for up to one week. The patient can do this cortisone injection once every 3 months as needed. Patient Education        Sciatica: Exercises  Introduction  Here are some examples of typical rehabilitation exercises for your condition. Start each exercise slowly. Ease off the exercise if you start to have pain. Your doctor or physical therapist will tell you when you can start these exercises and which ones will work best for you. When you are not being active, find a comfortable position for rest. Some people are comfortable on the floor or a medium-firm bed with a small pillow under their head and another under their knees. Some people prefer to lie on their side with a pillow between their knees. Don't stay in one position for too long. Take short walks (10 to 20 minutes) every 2 to 3 hours. Avoid slopes, hills, and stairs until you feel better. Walk only distances you can manage without pain, especially leg pain. How to do the exercises  Back stretches   1. Get down on your hands and knees on the floor. 2. Relax your head and allow it to droop. Round your back up toward the ceiling until you feel a nice stretch in your upper, middle, and lower back. Hold this stretch for as long as it feels comfortable, or about 15 to 30 seconds. 3. Return to the starting position with a flat back while you are on your hands and knees.   4. Let your back sway by pressing your stomach toward the floor. Lift your buttocks toward the ceiling. 5. Hold this position for 15 to 30 seconds. 6. Repeat 2 to 4 times. Follow-up care is a key part of your treatment and safety. Be sure to make and go to all appointments, and call your doctor if you are having problems. It's also a good idea to know your test results and keep a list of the medicines you take. Where can you learn more? Go to [email or web address]. org and sign in to your [employer] account. Enter Q168 in the [employer] box to learn more about \"Sciatica: Exercises. \"     If you do not have an account, please click on the \"Sign Up Now\" link. Current as of: March 2, 2020               Content Version: 12.6  © 5729-7857 [employer]. Care instructions adapted under license by [facility] [place] ([facility]). If you have questions about a medical condition or this instruction, always ask your healthcare professional. [place] [address] any warranty or liability for your use of this information. [address] [place] [address] and Sports Medicine    Dr. Carli Marquez,  & Osei Nicholson PA-C, ATC    Over the counter anti-inflammatory protocol (NSAIDS)    You may take the following over the counter medication for only 10-14 days to  reduce inflammation. If you develop an upset stomach, diarrhea, heartburn/GERD symptoms   discontinue immediately. If you need the medication on a long-term basis >greater than 10-14 days. Please contact your family doctor/PCP to discuss your options as you   will require ongoing medical monitoring.              Over the Counter/OTC             Ibuprofen/Advil/Motrin                                                                                                            200 mg tablets                  3-4 tables 3 times a day with food             OR            Naproxen Sodium, Aleve                    220 mg tablets          2 tablets 2 times a day with food           You May Add                           Tylenol extra strength, Acetaminophen           500 mg tablets               2 tablets every 8 hours    You may alternate with the NSAIDS for pain. NO more than 3000 mg of Tylenol/acetaminophen in 24 hours. '  Look at any OTC medications for added  Tylenol/acetaminophen--cold/sinus/flu medication.

## 2020-11-03 PROBLEM — I48.91 ATRIAL FIBRILLATION (HCC): Status: RESOLVED | Noted: 2019-07-30 | Resolved: 2020-11-03

## 2021-08-27 ENCOUNTER — OFFICE VISIT (OUTPATIENT)
Dept: ORTHOPEDIC SURGERY | Age: 51
End: 2021-08-27
Payer: COMMERCIAL

## 2021-08-27 VITALS
DIASTOLIC BLOOD PRESSURE: 106 MMHG | HEIGHT: 76 IN | WEIGHT: 275 LBS | HEART RATE: 76 BPM | SYSTOLIC BLOOD PRESSURE: 184 MMHG | RESPIRATION RATE: 16 BRPM | BODY MASS INDEX: 33.49 KG/M2

## 2021-08-27 DIAGNOSIS — M17.11 PRIMARY OSTEOARTHRITIS OF RIGHT KNEE: Primary | ICD-10-CM

## 2021-08-27 DIAGNOSIS — M25.561 ACUTE PAIN OF RIGHT KNEE: Primary | ICD-10-CM

## 2021-08-27 PROCEDURE — 20611 DRAIN/INJ JOINT/BURSA W/US: CPT | Performed by: PHYSICIAN ASSISTANT

## 2021-08-27 RX ORDER — LIDOCAINE HYDROCHLORIDE 10 MG/ML
8 INJECTION, SOLUTION INFILTRATION; PERINEURAL ONCE
Status: COMPLETED | OUTPATIENT
Start: 2021-08-27 | End: 2021-08-27

## 2021-08-27 RX ORDER — METHYLPREDNISOLONE ACETATE 40 MG/ML
40 INJECTION, SUSPENSION INTRA-ARTICULAR; INTRALESIONAL; INTRAMUSCULAR; SOFT TISSUE ONCE
Status: COMPLETED | OUTPATIENT
Start: 2021-08-27 | End: 2021-08-27

## 2021-08-27 RX ADMIN — METHYLPREDNISOLONE ACETATE 40 MG: 40 INJECTION, SUSPENSION INTRA-ARTICULAR; INTRALESIONAL; INTRAMUSCULAR; SOFT TISSUE at 09:57

## 2021-08-27 RX ADMIN — LIDOCAINE HYDROCHLORIDE 8 ML: 10 INJECTION, SOLUTION INFILTRATION; PERINEURAL at 09:57

## 2021-08-27 ASSESSMENT — ENCOUNTER SYMPTOMS
RESPIRATORY NEGATIVE: 1
ABDOMINAL DISTENTION: 0
COUGH: 0
SHORTNESS OF BREATH: 0
COLOR CHANGE: 0
VOMITING: 0
APNEA: 0
CHEST TIGHTNESS: 0
CONSTIPATION: 0
NAUSEA: 0
DIARRHEA: 0
ABDOMINAL PAIN: 0

## 2021-08-27 NOTE — PROGRESS NOTES
[address] [facility]  [address] [place] [id number]  Dept: [phone]  Dept Fax: [phone]          Right Knee - Follow Up     Subjective:     Chief Complaint   Patient presents with    Knee Pain     Right Knee Pain & Swelling     HPI:     Deon Bustamante presents today for Right knee pain. The pain has been present for a couple of weeks. The patient does not recall a specific injury or trauma to the right knee. He doing some yard work and tweaked his knee. A couple of days later the knee was swollen and painful. The patient has tried Advil, ice, a knee brace, and rest with some improvement. The pain is now described as [place] and achy. There is pain with weight bearing. The knee has swelled. There is no painful popping and clicking. The knee has not caught or locked up. The knee has not given out. It is stiff upon arising from sitting. It is painful to go up and down stairs and sit for a prolonged time. The patient has had a cortisone injection on 10/19/2020 and has been good since that injection until just a couple of weeks ago. The patient has not tried a lubrication injection. The patient has not tried physical therapy. The patient has had surgery on his right knee in 1986. The patient has a history of a high blood pressure and works with his cardiologist with his medication. He monitors his blood pressure regularly at home. ROS:   Review of Systems   Constitutional: Positive for activity change. Negative for appetite change, fatigue and fever. Respiratory: Negative. Negative for apnea, cough, chest tightness and shortness of breath. Cardiovascular: Negative. Negative for chest pain, palpitations and leg swelling. Gastrointestinal: Negative for abdominal distention, abdominal pain, constipation, diarrhea, nausea and vomiting. Genitourinary: Negative for difficulty urinating, dysuria and hematuria. aspiration were discussed with the patient including signs of infection (increasing pain, redness, swelling) and the patient was instructed to call immediately if experiencing any of these symptoms. Regular Knee Injection    Location: Right Knee  Procedure: I discussed in detail the risks, benefits and complications of the corticosteroid injection which included but are not limited to: infection, skin reactions, hot swollen, and anaphylaxis with the patient. Khanh Kunz verbalized understanding and they have agreed to have the corticosteroid injection into the right knee. The patient was placed in the Supine position on the exam table. The superior lateral portal was identified and marked with a ball point pen. The skin was prepped with betadine in a sterile fashion. Utilizing a [employer] ultrasound unit with a variable frequency linear transducer and clean technique with sterile gloves, a 5 cc solution containing 4 cc of 1.0% Lidocaine with 1 cc containing 40 mg of Depo-medrol  was injected. There was no resistance to the injection. The wound was cleansed and a band-aid was placed. the patient tolerated the procedure without difficulty. Adverse reactions to the injection were discussed with the patient including signs of infection (increasing pain, redness, swelling) and the patient was instructed to call immediately if experiencing any of these symptoms. Images of the injection site were recorded throughout the procedure and are saved on the SD card which is stored in the GE ultrasound unit. All images were downloaded and stored in patient's chart. Radiology:   KNEE X-RAY     4 views of the right knee including AP, bilateral tunnel, and lateral in the upright position, and skyline views reveal anatomic alignment with no fracture or dislocation. Kellgren grade II/III changes of osteoarthritis (joint space narrowing, osteophyte, subchondral sclerosis, bony deformity/cyst) of the tri compartment(s).  No osseous loose bodies. No bony erosion or periosteal reaction. No soft tissue masses. Chondrocalcinosis noted. Impression: Moderate arthritic changes of the right knee. Plan:   Treatment : I reviewed the x-ray with the patient and I informed them that the tray shows moderate osteoarthritis of the right knee. We discussed the etiologies and natural histories of primary osteoarthritis of the right knee. We discussed the various treatment alternatives including anti-inflammatory medications, physical therapy, injections, further imaging studies and as a last result surgery. During today's visit, we discussed that eventually the only thing is probably can work is a knee replacement. He is not interested in having a knee replacement at this time. The last time he had a cortisone shot it lasted almost a year. He would like to continue conservative treatments including injections, braces, over-the-counter anti-inflammatories. The patient has opted for a cortisone injection into the right knee to help reduce inflammation and pain. The injection site should never get red, hot, or swollen and if it does the patient will contact our office right away. The patient may experience a increase in soreness the first 24-48 hours due to a cortisone flair and can take anti-inflammatories for a short period of time to reduce that soreness. The patient should not submerge the injection site in water for a minimum of 24 hours to avoid infection. This means no lakes, pools, ponds, or hot tubs for 24 hours. If the patient is diabetic the injection may increase their blood sugar for up to one week. The patient can do this cortisone injection once every 3 months as needed. If the injections stop working and do not give the patient relief the patient should consider surgical interventions to produce long term relief. Patient should return to the clinic in 6 weeks to follow up with  Jose Loredo PA-C.  The patient will call the office immediately with any problems. Orders Placed This Encounter   Medications    lidocaine 1 % injection 8 mL    methylPREDNISolone acetate (DEPO-MEDROL) injection 40 mg       No orders of the defined types were placed in this encounter.         Electronically signed by Al Esposito PA-C, on 8/27/2021 at 9:11 AM

## 2021-11-29 ENCOUNTER — ANESTHESIA (OUTPATIENT)
Dept: OPERATING ROOM | Age: 51
End: 2021-11-29
Payer: COMMERCIAL

## 2021-11-29 ENCOUNTER — HOSPITAL ENCOUNTER (OUTPATIENT)
Age: 51
Setting detail: OUTPATIENT SURGERY
Discharge: HOME | End: 2021-11-29
Attending: OTOLARYNGOLOGY | Admitting: OTOLARYNGOLOGY
Payer: COMMERCIAL

## 2021-11-29 ENCOUNTER — ANESTHESIA EVENT (OUTPATIENT)
Dept: OPERATING ROOM | Age: 51
End: 2021-11-29
Payer: COMMERCIAL

## 2021-11-29 VITALS
HEIGHT: 76 IN | SYSTOLIC BLOOD PRESSURE: 163 MMHG | RESPIRATION RATE: 22 BRPM | WEIGHT: 276 LBS | HEART RATE: 82 BPM | OXYGEN SATURATION: 92 % | DIASTOLIC BLOOD PRESSURE: 100 MMHG | BODY MASS INDEX: 33.61 KG/M2 | TEMPERATURE: 97.5 F

## 2021-11-29 VITALS — TEMPERATURE: 96.8 F | OXYGEN SATURATION: 87 % | DIASTOLIC BLOOD PRESSURE: 88 MMHG | SYSTOLIC BLOOD PRESSURE: 148 MMHG

## 2021-11-29 DIAGNOSIS — G89.18 POST-OPERATIVE PAIN: Primary | ICD-10-CM

## 2021-11-29 PROCEDURE — 2500000003 HC RX 250 WO HCPCS: Performed by: OTOLARYNGOLOGY

## 2021-11-29 PROCEDURE — 3600000012 HC SURGERY LEVEL 2 ADDTL 15MIN: Performed by: OTOLARYNGOLOGY

## 2021-11-29 PROCEDURE — 2580000003 HC RX 258: Performed by: ANESTHESIOLOGY

## 2021-11-29 PROCEDURE — 2500000003 HC RX 250 WO HCPCS: Performed by: NURSE ANESTHETIST, CERTIFIED REGISTERED

## 2021-11-29 PROCEDURE — 3600000002 HC SURGERY LEVEL 2 BASE: Performed by: OTOLARYNGOLOGY

## 2021-11-29 PROCEDURE — 2709999900 HC NON-CHARGEABLE SUPPLY: Performed by: OTOLARYNGOLOGY

## 2021-11-29 PROCEDURE — 6360000002 HC RX W HCPCS: Performed by: OTOLARYNGOLOGY

## 2021-11-29 PROCEDURE — 6370000000 HC RX 637 (ALT 250 FOR IP)

## 2021-11-29 PROCEDURE — 7100000011 HC PHASE II RECOVERY - ADDTL 15 MIN: Performed by: OTOLARYNGOLOGY

## 2021-11-29 PROCEDURE — 2720000010 HC SURG SUPPLY STERILE: Performed by: OTOLARYNGOLOGY

## 2021-11-29 PROCEDURE — 6360000002 HC RX W HCPCS: Performed by: NURSE ANESTHETIST, CERTIFIED REGISTERED

## 2021-11-29 PROCEDURE — 2500000003 HC RX 250 WO HCPCS: Performed by: ANESTHESIOLOGY

## 2021-11-29 PROCEDURE — 6370000000 HC RX 637 (ALT 250 FOR IP): Performed by: OTOLARYNGOLOGY

## 2021-11-29 PROCEDURE — 3700000000 HC ANESTHESIA ATTENDED CARE: Performed by: OTOLARYNGOLOGY

## 2021-11-29 PROCEDURE — 7100000010 HC PHASE II RECOVERY - FIRST 15 MIN: Performed by: OTOLARYNGOLOGY

## 2021-11-29 PROCEDURE — 2580000003 HC RX 258: Performed by: OTOLARYNGOLOGY

## 2021-11-29 PROCEDURE — 6370000000 HC RX 637 (ALT 250 FOR IP): Performed by: ANESTHESIOLOGY

## 2021-11-29 PROCEDURE — 7100000001 HC PACU RECOVERY - ADDTL 15 MIN: Performed by: OTOLARYNGOLOGY

## 2021-11-29 PROCEDURE — 7100000000 HC PACU RECOVERY - FIRST 15 MIN: Performed by: OTOLARYNGOLOGY

## 2021-11-29 PROCEDURE — 3700000001 HC ADD 15 MINUTES (ANESTHESIA): Performed by: OTOLARYNGOLOGY

## 2021-11-29 RX ORDER — SODIUM CHLORIDE 9 MG/ML
INJECTION, SOLUTION INTRAVENOUS CONTINUOUS
Status: DISCONTINUED | OUTPATIENT
Start: 2021-11-29 | End: 2021-11-29 | Stop reason: HOSPADM

## 2021-11-29 RX ORDER — OXYMETAZOLINE HYDROCHLORIDE 0.05 G/100ML
SPRAY NASAL PRN
Status: DISCONTINUED | OUTPATIENT
Start: 2021-11-29 | End: 2021-11-29 | Stop reason: ALTCHOICE

## 2021-11-29 RX ORDER — FENTANYL CITRATE 50 UG/ML
25 INJECTION, SOLUTION INTRAMUSCULAR; INTRAVENOUS EVERY 5 MIN PRN
Status: DISCONTINUED | OUTPATIENT
Start: 2021-11-29 | End: 2021-11-29 | Stop reason: HOSPADM

## 2021-11-29 RX ORDER — LIDOCAINE HYDROCHLORIDE AND EPINEPHRINE 10; 10 MG/ML; UG/ML
INJECTION, SOLUTION INFILTRATION; PERINEURAL PRN
Status: DISCONTINUED | OUTPATIENT
Start: 2021-11-29 | End: 2021-11-29 | Stop reason: ALTCHOICE

## 2021-11-29 RX ORDER — PROPOFOL 10 MG/ML
INJECTION, EMULSION INTRAVENOUS PRN
Status: DISCONTINUED | OUTPATIENT
Start: 2021-11-29 | End: 2021-11-29 | Stop reason: SDUPTHER

## 2021-11-29 RX ORDER — SODIUM CHLORIDE, SODIUM LACTATE, POTASSIUM CHLORIDE, CALCIUM CHLORIDE 600; 310; 30; 20 MG/100ML; MG/100ML; MG/100ML; MG/100ML
INJECTION, SOLUTION INTRAVENOUS CONTINUOUS
Status: DISCONTINUED | OUTPATIENT
Start: 2021-11-29 | End: 2021-11-29 | Stop reason: HOSPADM

## 2021-11-29 RX ORDER — HYDRALAZINE HYDROCHLORIDE 20 MG/ML
5 INJECTION INTRAMUSCULAR; INTRAVENOUS EVERY 10 MIN PRN
Status: DISCONTINUED | OUTPATIENT
Start: 2021-11-29 | End: 2021-11-29 | Stop reason: HOSPADM

## 2021-11-29 RX ORDER — SODIUM CHLORIDE 9 MG/ML
INJECTION INTRAVENOUS PRN
Status: DISCONTINUED | OUTPATIENT
Start: 2021-11-29 | End: 2021-11-29 | Stop reason: ALTCHOICE

## 2021-11-29 RX ORDER — CEPHALEXIN 500 MG/1
500 CAPSULE ORAL 3 TIMES DAILY
Qty: 21 CAPSULE | Refills: 0 | Status: SHIPPED | OUTPATIENT
Start: 2021-11-29 | End: 2021-12-06

## 2021-11-29 RX ORDER — OXYCODONE HYDROCHLORIDE AND ACETAMINOPHEN 5; 325 MG/1; MG/1
2 TABLET ORAL PRN
Status: DISCONTINUED | OUTPATIENT
Start: 2021-11-29 | End: 2021-11-29 | Stop reason: HOSPADM

## 2021-11-29 RX ORDER — OXYMETAZOLINE HYDROCHLORIDE 0.05 G/100ML
2 SPRAY NASAL ONCE
Status: COMPLETED | OUTPATIENT
Start: 2021-11-29 | End: 2021-11-29

## 2021-11-29 RX ORDER — HYDROMORPHONE HYDROCHLORIDE 1 MG/ML
0.5 INJECTION, SOLUTION INTRAMUSCULAR; INTRAVENOUS; SUBCUTANEOUS EVERY 5 MIN PRN
Status: DISCONTINUED | OUTPATIENT
Start: 2021-11-29 | End: 2021-11-29 | Stop reason: HOSPADM

## 2021-11-29 RX ORDER — FENTANYL CITRATE 50 UG/ML
INJECTION, SOLUTION INTRAMUSCULAR; INTRAVENOUS PRN
Status: DISCONTINUED | OUTPATIENT
Start: 2021-11-29 | End: 2021-11-29 | Stop reason: SDUPTHER

## 2021-11-29 RX ORDER — LABETALOL HYDROCHLORIDE 5 MG/ML
5 INJECTION, SOLUTION INTRAVENOUS EVERY 10 MIN PRN
Status: DISCONTINUED | OUTPATIENT
Start: 2021-11-29 | End: 2021-11-29 | Stop reason: HOSPADM

## 2021-11-29 RX ORDER — SODIUM CHLORIDE 0.9 % (FLUSH) 0.9 %
10 SYRINGE (ML) INJECTION PRN
Status: DISCONTINUED | OUTPATIENT
Start: 2021-11-29 | End: 2021-11-29 | Stop reason: HOSPADM

## 2021-11-29 RX ORDER — SODIUM CHLORIDE 0.9 % (FLUSH) 0.9 %
10 SYRINGE (ML) INJECTION EVERY 12 HOURS SCHEDULED
Status: DISCONTINUED | OUTPATIENT
Start: 2021-11-29 | End: 2021-11-29 | Stop reason: HOSPADM

## 2021-11-29 RX ORDER — OXYCODONE HYDROCHLORIDE AND ACETAMINOPHEN 5; 325 MG/1; MG/1
1 TABLET ORAL PRN
Status: DISCONTINUED | OUTPATIENT
Start: 2021-11-29 | End: 2021-11-29 | Stop reason: HOSPADM

## 2021-11-29 RX ORDER — LIDOCAINE HYDROCHLORIDE 10 MG/ML
1 INJECTION, SOLUTION EPIDURAL; INFILTRATION; INTRACAUDAL; PERINEURAL
Status: DISCONTINUED | OUTPATIENT
Start: 2021-11-29 | End: 2021-11-29 | Stop reason: HOSPADM

## 2021-11-29 RX ORDER — LIDOCAINE HYDROCHLORIDE 20 MG/ML
INJECTION, SOLUTION EPIDURAL; INFILTRATION; INTRACAUDAL; PERINEURAL PRN
Status: DISCONTINUED | OUTPATIENT
Start: 2021-11-29 | End: 2021-11-29 | Stop reason: SDUPTHER

## 2021-11-29 RX ORDER — HYDROCODONE BITARTRATE AND ACETAMINOPHEN 5; 325 MG/1; MG/1
1 TABLET ORAL EVERY 4 HOURS PRN
Qty: 30 TABLET | Refills: 0 | Status: SHIPPED | OUTPATIENT
Start: 2021-11-29 | End: 2021-12-04

## 2021-11-29 RX ORDER — ONDANSETRON 2 MG/ML
4 INJECTION INTRAMUSCULAR; INTRAVENOUS
Status: DISCONTINUED | OUTPATIENT
Start: 2021-11-29 | End: 2021-11-29 | Stop reason: HOSPADM

## 2021-11-29 RX ORDER — ONDANSETRON 2 MG/ML
INJECTION INTRAMUSCULAR; INTRAVENOUS PRN
Status: DISCONTINUED | OUTPATIENT
Start: 2021-11-29 | End: 2021-11-29 | Stop reason: SDUPTHER

## 2021-11-29 RX ORDER — METOCLOPRAMIDE HYDROCHLORIDE 5 MG/ML
INJECTION INTRAMUSCULAR; INTRAVENOUS PRN
Status: DISCONTINUED | OUTPATIENT
Start: 2021-11-29 | End: 2021-11-29 | Stop reason: SDUPTHER

## 2021-11-29 RX ORDER — SODIUM CHLORIDE 9 MG/ML
25 INJECTION, SOLUTION INTRAVENOUS PRN
Status: DISCONTINUED | OUTPATIENT
Start: 2021-11-29 | End: 2021-11-29 | Stop reason: HOSPADM

## 2021-11-29 RX ORDER — GLYCOPYRROLATE 1 MG/5 ML
SYRINGE (ML) INTRAVENOUS PRN
Status: DISCONTINUED | OUTPATIENT
Start: 2021-11-29 | End: 2021-11-29 | Stop reason: SDUPTHER

## 2021-11-29 RX ORDER — PROMETHAZINE HYDROCHLORIDE 25 MG/ML
6.25 INJECTION, SOLUTION INTRAMUSCULAR; INTRAVENOUS
Status: DISCONTINUED | OUTPATIENT
Start: 2021-11-29 | End: 2021-11-29 | Stop reason: HOSPADM

## 2021-11-29 RX ORDER — HYDROCODONE BITARTRATE AND ACETAMINOPHEN 5; 325 MG/1; MG/1
TABLET ORAL
Status: COMPLETED
Start: 2021-11-29 | End: 2021-11-29

## 2021-11-29 RX ORDER — ROCURONIUM BROMIDE 10 MG/ML
INJECTION, SOLUTION INTRAVENOUS PRN
Status: DISCONTINUED | OUTPATIENT
Start: 2021-11-29 | End: 2021-11-29 | Stop reason: SDUPTHER

## 2021-11-29 RX ORDER — ACETAMINOPHEN 500 MG
1000 TABLET ORAL ONCE
Status: COMPLETED | OUTPATIENT
Start: 2021-11-29 | End: 2021-11-29

## 2021-11-29 RX ORDER — DEXAMETHASONE SODIUM PHOSPHATE 10 MG/ML
INJECTION INTRAMUSCULAR; INTRAVENOUS PRN
Status: DISCONTINUED | OUTPATIENT
Start: 2021-11-29 | End: 2021-11-29 | Stop reason: SDUPTHER

## 2021-11-29 RX ORDER — HYDROCODONE BITARTRATE AND ACETAMINOPHEN 5; 325 MG/1; MG/1
1 TABLET ORAL ONCE
Status: COMPLETED | OUTPATIENT
Start: 2021-11-29 | End: 2021-11-29

## 2021-11-29 RX ADMIN — PROPOFOL 200 MG: 10 INJECTION, EMULSION INTRAVENOUS at 09:08

## 2021-11-29 RX ADMIN — Medication 100 MCG: at 09:08

## 2021-11-29 RX ADMIN — LIDOCAINE HYDROCHLORIDE 100 MG: 20 INJECTION, SOLUTION EPIDURAL; INFILTRATION; INTRACAUDAL; PERINEURAL at 09:08

## 2021-11-29 RX ADMIN — Medication 2 SPRAY: at 08:46

## 2021-11-29 RX ADMIN — SUGAMMADEX 500 MG: 100 INJECTION, SOLUTION INTRAVENOUS at 09:41

## 2021-11-29 RX ADMIN — ROCURONIUM BROMIDE 50 MG: 10 INJECTION, SOLUTION INTRAVENOUS at 09:08

## 2021-11-29 RX ADMIN — METOCLOPRAMIDE 10 MG: 5 INJECTION, SOLUTION INTRAMUSCULAR; INTRAVENOUS at 09:15

## 2021-11-29 RX ADMIN — Medication 0.2 MG: at 09:32

## 2021-11-29 RX ADMIN — SODIUM CHLORIDE, POTASSIUM CHLORIDE, SODIUM LACTATE AND CALCIUM CHLORIDE: 600; 310; 30; 20 INJECTION, SOLUTION INTRAVENOUS at 08:06

## 2021-11-29 RX ADMIN — HYDROCODONE BITARTRATE AND ACETAMINOPHEN 1 TABLET: 5; 325 TABLET ORAL at 10:59

## 2021-11-29 RX ADMIN — LABETALOL HYDROCHLORIDE 5 MG: 5 INJECTION INTRAVENOUS at 10:47

## 2021-11-29 RX ADMIN — LABETALOL HYDROCHLORIDE 5 MG: 5 INJECTION INTRAVENOUS at 10:34

## 2021-11-29 RX ADMIN — ONDANSETRON 4 MG: 2 INJECTION, SOLUTION INTRAMUSCULAR; INTRAVENOUS at 09:15

## 2021-11-29 RX ADMIN — ACETAMINOPHEN 1000 MG: 500 TABLET ORAL at 08:47

## 2021-11-29 RX ADMIN — CEFAZOLIN SODIUM 3000 MG: 10 INJECTION, POWDER, FOR SOLUTION INTRAVENOUS at 09:19

## 2021-11-29 RX ADMIN — DEXAMETHASONE SODIUM PHOSPHATE 10 MG: 10 INJECTION INTRAMUSCULAR; INTRAVENOUS at 09:15

## 2021-11-29 ASSESSMENT — PULMONARY FUNCTION TESTS
PIF_VALUE: 25
PIF_VALUE: 24
PIF_VALUE: 23
PIF_VALUE: 2
PIF_VALUE: 8
PIF_VALUE: 25
PIF_VALUE: 24
PIF_VALUE: 4
PIF_VALUE: 25
PIF_VALUE: 24
PIF_VALUE: 16
PIF_VALUE: 2
PIF_VALUE: 1
PIF_VALUE: 23
PIF_VALUE: 8
PIF_VALUE: 25
PIF_VALUE: 5
PIF_VALUE: 30
PIF_VALUE: 24
PIF_VALUE: 0
PIF_VALUE: 3
PIF_VALUE: 7
PIF_VALUE: 23
PIF_VALUE: 41
PIF_VALUE: 24
PIF_VALUE: 46
PIF_VALUE: 23
PIF_VALUE: 11
PIF_VALUE: 24
PIF_VALUE: 28
PIF_VALUE: 0
PIF_VALUE: 33
PIF_VALUE: 24
PIF_VALUE: 0
PIF_VALUE: 21
PIF_VALUE: 22
PIF_VALUE: 26
PIF_VALUE: 24
PIF_VALUE: 23
PIF_VALUE: 1
PIF_VALUE: 23
PIF_VALUE: 24
PIF_VALUE: 22
PIF_VALUE: 25
PIF_VALUE: 24
PIF_VALUE: 26
PIF_VALUE: 24
PIF_VALUE: 23
PIF_VALUE: 4
PIF_VALUE: 24
PIF_VALUE: 24
PIF_VALUE: 33

## 2021-11-29 ASSESSMENT — PAIN DESCRIPTION - PAIN TYPE
TYPE: SURGICAL PAIN
TYPE: SURGICAL PAIN

## 2021-11-29 ASSESSMENT — PAIN SCALES - GENERAL
PAINLEVEL_OUTOF10: 0
PAINLEVEL_OUTOF10: 4
PAINLEVEL_OUTOF10: 5
PAINLEVEL_OUTOF10: 6

## 2021-11-29 ASSESSMENT — PAIN DESCRIPTION - LOCATION
LOCATION: NOSE;HEAD
LOCATION: NOSE

## 2021-11-29 ASSESSMENT — PAIN DESCRIPTION - PROGRESSION: CLINICAL_PROGRESSION: GRADUALLY IMPROVING

## 2021-11-29 ASSESSMENT — PAIN DESCRIPTION - DESCRIPTORS: DESCRIPTORS: HEADACHE

## 2021-11-29 NOTE — ANESTHESIA PRE PROCEDURE
Department of Anesthesiology  Preprocedure Note       Name:  Susanna Blair   Age:  46 y.o.  :  1970                                          MRN:  [id number]         Date:  2021      Surgeon: Belinda Rodriges):  Colette Gaona MD    Procedure: Procedure(s):  SEPTOPLASTY BILATERAL TURBINATE REDUCTION    Medications prior to admission:   Prior to Admission medications    Medication Sig Start Date End Date Taking? Authorizing Provider   HYDROcodone-acetaminophen (NORCO) 5-325 MG per tablet Take 1 tablet by mouth every 4 hours as needed for Pain for up to 5 days. Intended supply: 5 days.  Take lowest dose possible to manage pain 21 Yes Colette Gaona MD   cephALEXin (KEFLEX) 500 MG capsule Take 1 capsule by mouth 3 times daily for 7 days 21 Yes Colette Gaona MD   labetalol (NORMODYNE) 200 MG tablet Take 200 mg by mouth 2 times daily   Yes Historical Provider, MD   amLODIPine (NORVASC) 5 MG tablet Take 5 mg by mouth daily   Yes Historical Provider, MD   hydroCHLOROthiazide (HYDRODIURIL) 12.5 MG tablet Take 12.5 mg by mouth daily   Yes Historical Provider, MD   acetaminophen (TYLENOL) 500 MG tablet Take 500 mg by mouth every 6 hours as needed for Pain   Yes Historical Provider, MD   Multiple Vitamins-Minerals (MULTIVITAMIN ADULT PO) Take 1 tablet by mouth daily    Yes Historical Provider, MD   rosuvastatin (CRESTOR) 10 MG tablet Take 10 mg by mouth daily   Yes Historical Provider, MD       Current medications:    Current Facility-Administered Medications   Medication Dose Route Frequency Provider Last Rate Last Admin    0.9 % sodium chloride infusion   IntraVENous Continuous Ndal Renee Aaron MD        lactated ringers infusion   IntraVENous Continuous Latrell Germain  mL/hr at 21 0806 New Bag at 21 0806    sodium chloride flush 0.9 % injection 10 mL  10 mL IntraVENous 2 times per day Latrell Germain MD        sodium chloride flush 0.9 % injection 10 mL  10 mL IntraVENous PRN Geovanna Gross MD        0.9 % sodium chloride infusion  25 mL IntraVENous PRN Ndal Debbi Fothergill, MD        lidocaine PF 1 % injection 1 mL  1 mL IntraDERmal Once PRN Geovanna Gross MD        ceFAZolin (ANCEF) 3,000 mg in dextrose 5 % 100 mL IVPB  3,000 mg IntraVENous Once Manolo Yeager MD           Allergies:  No Known Allergies    Problem List:    Patient Active Problem List   Diagnosis Code    Hyperlipidemia E78.5    Atrial fibrillation with rapid ventricular response ([address] I48.91    Atrial fibrillation with RVR ([address] I48.91    Essential hypertension I10    Hypotension due to drugs 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 Dominic Guaman. 1 shock @ 200 converted to NSR    COLONOSCOPY      KNEE ARTHROSCOPY Bilateral     KNEE SURGERY      MOHS SURGERY      1st stage head and neck    SKIN BIOPSY      TRANSESOPHAGEAL ECHOCARDIOGRAM  06/07/2019    VASCULAR SURGERY      VEIN SURGERY Left        Social History:    Social History     Tobacco Use    Smoking status: Never Smoker    Smokeless tobacco: Never Used   Substance Use Topics    Alcohol use:  Yes     Alcohol/week: 3.3 standard drinks     Types: 4 drink(s) per week     Comment: socially                                Counseling given: Not Answered      Vital Signs (Current):   Vitals:    11/29/21 0750   BP: (!) 173/89   Pulse: 86   Resp: 18   Temp: 97.3 °F (36.3 °C)   SpO2: 96%   Weight: 276 lb (125.2 kg)   Height: 6' 4\" (1.93 m)                                              BP Readings from Last 3 Encounters:   11/29/21 (!) 173/89   08/27/21 (!) 184/106 (-) COPD and asthma                           Cardiovascular:  Exercise tolerance: no interval change,   (+) hypertension:, hyperlipidemia          Rate: normal                    Neuro/Psych:      (-) CVA           GI/Hepatic/Renal:             Endo/Other:    (+) : arthritis:., .                 Abdominal:   (+) obese,           Vascular: Other Findings:             Anesthesia Plan      general     ASA 2       Induction: intravenous. MIPS: Prophylactic antiemetics administered. Anesthetic plan and risks discussed with patient. Plan discussed with CRNA.     Attending anesthesiologist reviewed and agrees with Preprocedure content          afib s/p ablation     Nathan Smith DO   11/29/2021

## 2021-11-29 NOTE — H&P
History and Physical Service   [facility]    HISTORY AND PHYSICAL EXAMINATION            Date of Evaluation: 11/29/2021  Patient name:  Parvez Maldonado  MRN:   [id number]  YOB: 1970  PCP:    Eli Ackerman MD    History Obtained From:     Patient    History of Present Illness: This is Parvez Maldonado a 46 y.o. male who presents today for a septoplasty bilateral turbinate reduction by Dr. Ciera Khan due to deviated nasal septum, obstructive sleep apnea, nasal airway obstruction, hypertrophy nasal turbinates. Pt states he has a history of nose trauma from playing sports in high school. The pt snores and has sleep apnea. Pt states he wears a CPAP every night. He has nasal congestion which improves with Flonase. Pt denies rhinorrhea, fevers, chills, chest pain, dyspnea, rashes, open sores, wounds, and history of diabetes. Multivitamin was last taken on 11/28/2021. History of atrial fibrillation. S/p cardioversions and cardiac ablation in 2019. Aspirin was last taken on 11/22/2021. Pt's blood pressure today is 173/89. Pt denies chest pain, dyspnea, sudden changes in vision, dizziness, lightheadedness, and headache. Hydrochlorothiazide was last taken on 11/28/2021. Norvasc and labetalol were taken today at 0630. Dr. Lloyd Ness is aware of the pt's elevated blood pressure. Pt states he will discuss his elevated blood pressure with Dr. Ramon Yarbrough. Past Medical History:     Past Medical History:   Diagnosis Date    Arthritis     Atrial fibrillation ([address]     COVID-19     Hyperlipidemia     Hypertension     Skin cancer     Sleep apnea         Past Surgical History:     Past Surgical History:   Procedure Laterality Date    ABLATION OF DYSRHYTHMIC FOCUS  07/2019    CARDIAC SURGERY      CARDIOVERSION  06/07/2019    CARDIOVERSION  07/01/2019    by Dr. Calista De Luna.  1 shock @ 200 converted to NSR    COLONOSCOPY      KNEE ARTHROSCOPY Bilateral     KNEE SURGERY      MOHS SURGERY      1st stage head and neck    SKIN BIOPSY      TRANSESOPHAGEAL ECHOCARDIOGRAM  06/07/2019    VASCULAR SURGERY      VEIN SURGERY Left         Medications Prior to Admission:     Prior to Admission medications    Medication Sig Start Date End Date Taking? Authorizing Provider   labetalol (NORMODYNE) 200 MG tablet Take 200 mg by mouth 2 times daily   Yes Historical Provider, MD   amLODIPine (NORVASC) 5 MG tablet Take 5 mg by mouth daily   Yes Historical Provider, MD   hydroCHLOROthiazide (HYDRODIURIL) 12.5 MG tablet Take 12.5 mg by mouth daily   Yes Historical Provider, MD   acetaminophen (TYLENOL) 500 MG tablet Take 500 mg by mouth every 6 hours as needed for Pain   Yes Historical Provider, MD   Multiple Vitamins-Minerals (MULTIVITAMIN ADULT PO) Take 1 tablet by mouth daily    Yes Historical Provider, MD   rosuvastatin (CRESTOR) 10 MG tablet Take 10 mg by mouth daily   Yes Historical Provider, MD   aspirin 81 MG tablet Take 81 mg by mouth daily    Historical Provider, MD        Allergies:     Patient has no known allergies. Social History:     Tobacco:    reports that he has never smoked. He has never used smokeless tobacco.  Alcohol:      reports current alcohol use of about 3.3 standard drinks of alcohol per week. Drug Use:  reports no history of drug use. Family History:     Family History   Problem Relation Age of Onset    Alzheimer's Disease Mother        Review of Systems:     Positive and Negative as described in HPI. CONSTITUTIONAL: Pt states he had COVID 2 weeks ago. Negative for fevers, chills, sweats, fatigue, and weight loss. HEENT: Pt wears glasses. Negative for hearing changes, rhinorrhea, and throat pain. RESPIRATORY: Sleep apnea. Negative for shortness of breath, cough, congestion, and wheezing. CARDIOVASCULAR: Atrial fibrillation. S/p cardioversions and cardiac ablations in 2019. Pt follows-up with Dr. Sarah Kenney.  Negative for chest pain, blood clot, and palpitations. GASTROINTESTINAL: Negative for reflux, nausea, vomiting, diarrhea, constipation, change in bowel habits, and abdominal pain. GENITOURINARY: Negative for difficulty of urination, burning with urination, and frequency. INTEGUMENT: Negative for rash, skin lesions, and easy bruising. HEMATOLOGIC/LYMPHATIC: Negative for swelling/edema. ALLERGIC/IMMUNOLOGIC: Negative for urticaria and itching. ENDOCRINE: Negative for increase in drinking, increase in urination, and heat or cold intolerance. MUSCULOSKELETAL: Bilateral knee pain. NEUROLOGICAL: Negative for headaches, dizziness, lightheadedness, numbness, and tingling extremities. BEHAVIOR/PSYCH: Negative for depression and anxiety. Physical Exam:   BP (!) 173/89   Pulse 86   Temp 97.3 °F (36.3 °C)   Resp 18   Ht 6' 4\" (1.93 m)   Wt 276 lb (125.2 kg)   SpO2 96%   BMI 33.60 kg/m²     No results for input(s): POCGLU in the last 72 hours. General Appearance:  Alert, well appearing, and in no acute distress. Obese. Mental status: Oriented to person, place, and time. Head: Normocephalic and atraumatic. Eye: No icterus, redness, pupils equal and reactive, extraocular eye movements intact, and conjunctiva clear. Ear: Hearing grossly intact. Nose: No drainage noted. Mouth: Mucous membranes moist.  Neck: Supple and no carotid bruits noted. Lungs: Bilateral equal air entry, clear to auscultation, no wheezing, rales or rhonchi, and normal effort. Cardiovascular: Normal rate, regular rhythm, no murmur, gallop, and rub. Abdomen: Soft, nontender, nondistended, and active bowel sounds. Neurologic: Normal speech and cranial nerves II through XII grossly intact. Strength 5/5 bilaterally. Skin: No gross lesions, rashes, bruising, or bleeding on exposed skin area. Extremities: Posterior tibial pulses 2+ bilaterally. No pedal edema. No calf tenderness with palpation. Psych: Normal affect.      Investigations:      Laboratory Testing:  No results found for this or any previous visit (from the past 24 hour(s)). No results for input(s): HGB, HCT, WBC, MCV, PLATELET, NA, K, CL, CO2, BUN, CREATININE, GLUCOSE, INR, PROTIME, APTT, AST, ALT, LABALBU, HCG in the last 720 hours. No results for input(s): COVID19 in the last 720 hours. Diagnosis:      1. Deviated nasal septum, obstructive sleep apnea, nasal airway obstruction, hypertrophy nasal turbinates    Plans:     1.  Septoplasty bilateral turbinate reduction       ISAIAH Obando CNP  11/29/2021  8:08 AM

## 2021-11-29 NOTE — OP NOTE
Operative Note      Patient: An Walker  YOB: 1970  MRN: [id number]    Date of Procedure: 11/29/2021    Patient Name: An Walker MRN: [id number]      Account Number: [de-identified]      Pre-Op Diagnosis:   1) Septal Deviation with resulting nasal obstruction      2) Bilateral inferior turbinate hypertrophy    Post-Op Diagnosis:   1) Septal Deviation with resulting nasal obstruction      2) Bilateral inferior turbinate hypertrophy    Operation:   1)  Septoplasty with submucous resection      2)  Bilateral submucous resection of inferior turbinates      3)  Bilateral outfracture of inferior turbinates    Surgeon:   Lucio Parnell M.D. Anesthesia:   General endotracheal,  1% Lidocaine       with 1:100,000 epinephrine    Findings:   The patient was noted to have a significant septal      deviation to the Left 3 +    Estimated blood loss:  Minimal  Assistant:   * No surgical staff found *    Complications: None    Specimens:   * No specimens in log *    Implants:  * No implants in log *      Drains: * No LDAs found *    Indications: This patient has a septal deviation with concomitant inferior turbinate hypertrophy resulting in nasal obstruction and persistent nasal symptoms despite medical therapy aimed at relieving this. The reason for the procedure as well as potential complications was discussed at great length with the patient. The discussion included but was not limited to bleeding, infection, septal hematoma, no improvement in breathing disorder, persistence of the nasal and sinus complaints, recurrent deviation, cerebrospinal fluid leak, anosmia, and anesthesia related complications were all mentioned. All questions were answered, and informed consent was then obtained. Procedure:    After the patient was positively identified in the pre-operative and operative suites, general endotracheal anesthesia was then induced.   The nasal mucosa was decongested with topical Afrin nasal spray and cotton pledgets soaked in Afrin solution. 1% Lidocaine with epinephrine was infiltrated into the nasal mucosa of the septum, floor of the nose, and columella. Injectable normal saline was injected into the inferior turbinates. The patient was then prepped and draped in the usual fashion. A hemitransfixtion incision was made on the LEFT side of the columella and taken down to the level of the cartilage. A mucoperichondrial flap was then elevated posteriorly past the perpendicular plate of the ethmoid process. An incision in the septal cartilage was made at least one centimeter posterior to the dorsal  edge of the septum, and care was taken to maintain an adequate amount of septal cartilage across the dorsum of the nose for support. Next, the mucoperichondrium was elevated on the opposite side. A swivel knife was then used to resect the deviated portion of the septal cartilage which was removed. The deviated portion of the perpendicular plate was removed with a double action biting forceps. The mucoperichondrium was then elevated off the deviated maxillary crest.  Using an osteotome and mallet, the deviated portion of the crest was removed. The septum was now midline. The columellar incision was sutured with 4-0 chromic suture placed in an interrupted fashion. Next, an incision was made at the anterior aspect of the left inferior turbinate and a submucosal tunnel was created. Hypertrophic tissue was removed, and the turbinate was treated with three passes of radiofrequency. The [facility] elevator was then used to outfracture the inferior turbinate. The same procedure was repeated on the right inferior turbinate. Now the nasal airway was noted to be widely patent bilaterally. Cintron nasal splints were covered in bacitracin ointment, and a piece was placed on each side of the septum and secured with a 3-0 silk suture.   A nasal dressing of 2 x 2 gauze was applied to the nose for

## 2021-11-29 NOTE — ANESTHESIA POSTPROCEDURE EVALUATION
Department of Anesthesiology  Postprocedure Note    Patient: Genesis Perez  MRN: [id number]  YOB: 1970  Date of evaluation: 11/29/2021  Time:  10:27 AM     Procedure Summary     Date: 11/29/21 Room / Location: [address]    Anesthesia Start: 9631 Anesthesia Stop: 1000    Procedure: SEPTOPLASTY BILATERAL TURBINATE REDUCTION (Bilateral ) Diagnosis: (DX DEVIATED NASAL SEPTUM, OBSTRUCTIVE  SLEEP APNEA, NASAL AIRWAY OBSTRUCTION, HYPERTROPHY NASAL TURBINATES)    Surgeons: Mark Anthony Cruz MD Responsible Provider: Eze Layne DO    Anesthesia Type: general ASA Status: 2          Anesthesia Type: general    Adeline Phase I: Adeline Score: 8    Adeline Phase II:      Last vitals: Reviewed and per EMR flowsheets.        Anesthesia Post Evaluation    Patient location during evaluation: PACU  Patient participation: complete - patient participated  Level of consciousness: awake and alert  Airway patency: patent  Nausea & Vomiting: no nausea and no vomiting  Complications: no  Cardiovascular status: hemodynamically stable  Respiratory status: acceptable  Hydration status: stable

## 2022-07-27 ENCOUNTER — OFFICE VISIT (OUTPATIENT)
Dept: ORTHOPEDIC SURGERY | Age: 52
End: 2022-07-27
Payer: COMMERCIAL

## 2022-07-27 VITALS
WEIGHT: 277 LBS | SYSTOLIC BLOOD PRESSURE: 149 MMHG | BODY MASS INDEX: 33.73 KG/M2 | DIASTOLIC BLOOD PRESSURE: 95 MMHG | HEART RATE: 84 BPM | RESPIRATION RATE: 12 BRPM | HEIGHT: 76 IN

## 2022-07-27 DIAGNOSIS — M17.11 PRIMARY OSTEOARTHRITIS OF RIGHT KNEE: Primary | ICD-10-CM

## 2022-07-27 PROCEDURE — 20611 DRAIN/INJ JOINT/BURSA W/US: CPT | Performed by: PHYSICIAN ASSISTANT

## 2022-07-27 PROCEDURE — 99213 OFFICE O/P EST LOW 20 MIN: CPT | Performed by: PHYSICIAN ASSISTANT

## 2022-07-27 RX ORDER — LIDOCAINE HYDROCHLORIDE 10 MG/ML
2 INJECTION, SOLUTION INFILTRATION; PERINEURAL ONCE
Status: COMPLETED | OUTPATIENT
Start: 2022-07-27 | End: 2022-07-27

## 2022-07-27 RX ORDER — AMLODIPINE BESYLATE 10 MG/1
10 TABLET ORAL DAILY
COMMUNITY
Start: 2022-05-19

## 2022-07-27 RX ORDER — TRIAMCINOLONE ACETONIDE 40 MG/ML
40 INJECTION, SUSPENSION INTRA-ARTICULAR; INTRAMUSCULAR ONCE
Status: COMPLETED | OUTPATIENT
Start: 2022-07-27 | End: 2022-07-27

## 2022-07-27 RX ADMIN — TRIAMCINOLONE ACETONIDE 40 MG: 40 INJECTION, SUSPENSION INTRA-ARTICULAR; INTRAMUSCULAR at 11:58

## 2022-07-27 RX ADMIN — LIDOCAINE HYDROCHLORIDE 2 ML: 10 INJECTION, SOLUTION INFILTRATION; PERINEURAL at 11:57

## 2022-07-27 ASSESSMENT — ENCOUNTER SYMPTOMS
ABDOMINAL DISTENTION: 0
RESPIRATORY NEGATIVE: 1
ABDOMINAL PAIN: 0
NAUSEA: 0
CONSTIPATION: 0
COLOR CHANGE: 0
APNEA: 0
VOMITING: 0
DIARRHEA: 0
SHORTNESS OF BREATH: 0
COUGH: 0
CHEST TIGHTNESS: 0

## 2022-07-27 NOTE — PROGRESS NOTES
[address] [place] [address] AND SPORTS MEDICINE  [facility] [address]  Dept: [phone]  Dept Fax: [phone]        Ambulatory Follow Up      Subjective: Cammy Christianson is a 45y.o. year old male who presents to our office today for routine followup regarding his   1. Primary osteoarthritis of right knee    . Chief Complaint   Patient presents with    Knee Pain     R Knee ( 8/27/21)         HPI Cammy Christianson  is a 45 y.o.  male who presents today in follow for right knee pain. The patient was last seen on 8/27/2021 and underwent treatment in the form of aspiration and a cortisone injection. The patient notes 100% improvement with the previous treatment. He was doing great until approximately a week ago. He has significant pain getting out of a chair or off the couch. He does have pain going up and down stairs. He has pain with weightbearing. It is definitely slowed him down because is painful due to his normal daily activities. He has tried Tylenol for the pain which has helped a little. Review of Systems   Constitutional:  Positive for activity change. Negative for appetite change, fatigue and fever. Respiratory: Negative. Negative for apnea, cough, chest tightness and shortness of breath. Cardiovascular: Negative. Negative for chest pain, palpitations and leg swelling. Gastrointestinal:  Negative for abdominal distention, abdominal pain, constipation, diarrhea, nausea and vomiting. Genitourinary:  Negative for difficulty urinating, dysuria and hematuria. Musculoskeletal:  Positive for arthralgias, gait problem and joint swelling. Negative for myalgias. Skin:  Negative for color change and rash. Neurological:  Negative for dizziness, weakness, numbness and headaches. Psychiatric/Behavioral:  Negative for sleep disturbance.         Objective :   BP (!) 149/95   Pulse 84   Resp 12   Ht 6' 4\" (1.93 m)   Wt 277 lb (125.6 kg)   BMI 33.72 kg/m²  Body mass index is 33.72 kg/m². General: Víctor Dobbs is a 45 y.o. male who is alert and oriented and sitting comfortably in our office. Orthopedics:    GENERAL: Alert and oriented X3 in no acute distress. SKIN: Intact without lesions or ulcerations. NEURO: Musculoskeletal and axillary nerves intact to sensory and motor testing. VASC: Capillary refill is less than 3 seconds. Knee Exam    LOCATION:  Right Knee  GEN: Alert and oriented X 3, in no acute distress. GAIT: The patient's gait was observed while entering the exam room and was noted to be  antalgic. The extremity is in varus alignment. SKIN: Intact without rashes, lesions, or ulcerations. No obvious deformity or swelling. NEURO: The patient responds to light touch throughout bilateral LE. Patellar and Achilles reflexes are 2/4. VASC: The bilateral LE is neurovascularly intact with 2/4 DP and 2/4 PT pulses. Brisk capillary refill. ROM: 0/115 degrees. There is mild effusion. MUSC: good quad tone  LIGAMENT:  There is  No varus instability at 0 degrees and No varus instability at 30 degrees. There is No valgus instability at 0 degrees and No valgus instability at 30 degrees. SPECIAL: German test is negative with no clunks, + crepitation, and no pain. PALP: There is medial joint line pain. Radiology: Previous x-rays reviewed    Procedure:     Regular Knee Injection    Location: Right Knee  Procedure: I discussed in detail the risks, benefits and complications of the corticosteroid injection which included but are not limited to: infection, skin reactions, hot swollen, and anaphylaxis with the patient. Víctor Dobbs verbalized understanding and they have agreed to have the corticosteroid injection into the right knee. The patient was placed in the Supine position on the exam table.  The superior lateral portal was identified and marked with a ball point pen. The skin was prepped with betadine in a sterile fashion. Utilizing a [employer] ultrasound unit with a variable frequency linear transducer and clean technique with sterile gloves, a 3 cc solution containing 2 cc of 1% lidocaine   with 1 cc containing 40 mg of Kenalog was injected. There was no resistance to the injection. The wound was cleansed and a band-aid was placed. the patient tolerated the procedure without difficulty. Adverse reactions to the injection were discussed with the patient including signs of infection (increasing pain, redness, swelling) and the patient was instructed to call immediately if experiencing any of these symptoms. Images of the injection site were recorded throughout the procedure and are saved on the SD card which is stored in the GE ultrasound unit. All images were downloaded and stored in patient's chart. Assessment:      1. Primary osteoarthritis of right knee       Plan:      I reviewed the patient's previous x-rays. We discussed the history of primary osteoarthritis of the right knee. His last injection worked really well until approximately a week ago. We can do another cortisone injection since that seemed to help significantly last time. He does have a little mild effusion in his knee today but nothing that needs to be drained. Today the patient opted fora cortisone injection into the left knee to help reduce inflammation and pain. The injection site should never get red, hot, or swollen and if it does the patient will contact our office right away. The patient may experience a increase in soreness the first 24-48 hours due to a cortisone flair and can take anti-inflammatories for a short period of time to reduce that soreness. The patient should not submerge the injection site in water for a minimum of 24 hours to avoid infection. This means no lakes, pools, ponds, or hot tubs for 24 hours.  If the patient is diabetic the injection may increase their blood sugar for up to one week. The patient can do this cortisone injection once every 3 months as needed. If the injections stop working and do not give the patient relief the patient should consider surgical interventions to produce long term relief. The patient will follow-up with us as needed. He will call the office if he has any questions or concerns. Follow up:Return if symptoms worsen or fail to improve. Orders Placed This Encounter   Medications    triamcinolone acetonide (KENALOG-40) injection 40 mg    lidocaine 1 % injection 2 mL           No orders of the defined types were placed in this encounter. This note is created with the assistance of a speech recognition program.  While intending to generate a document that actually reflects the content of the visit, the document can still have some errors including those of syntax and sound a like substitutions which may escape proof reading. In such instances, actual meaning can be extrapolated by contextual diversion.      Electronically signed by Edson Huddleston PA-C on 7/27/2022 at 7:38 PM

## 2022-08-18 ENCOUNTER — TELEPHONE (OUTPATIENT)
Dept: ORTHOPEDIC SURGERY | Age: 52
End: 2022-08-18

## 2022-08-18 DIAGNOSIS — M17.11 PRIMARY OSTEOARTHRITIS OF RIGHT KNEE: Primary | ICD-10-CM

## 2022-08-18 RX ORDER — METHYLPREDNISOLONE 4 MG/1
TABLET ORAL
Qty: 1 KIT | Refills: 0 | Status: SHIPPED | OUTPATIENT
Start: 2022-08-18 | End: 2022-08-24

## 2022-08-18 NOTE — TELEPHONE ENCOUNTER
Patient was last seen by [facility] on 7/27 for right knee pain and received a cortizone injection. Patient states this has not helped and is asking to be prescribed a prednisone pack. He states he previously had this same issue and it caused his sciatica to act up. He he asking for a return call to advise. Pharmacy on file has been verified.   Thank you,

## 2022-08-18 NOTE — TELEPHONE ENCOUNTER
Wife left message on answering machine, Pt. Saw Fartun Christiansen on July 27th and had a R knee injection. It's not any better and he's asking for medrol dose pack please sent to [facility] [address].

## 2022-08-26 ENCOUNTER — TELEPHONE (OUTPATIENT)
Dept: ORTHOPEDIC SURGERY | Age: 52
End: 2022-08-26

## 2022-08-26 NOTE — TELEPHONE ENCOUNTER
I spoke with [facility] regarding patient, she has no openings and is on vacation next week. She said that he could see Tommy at the [address] office as he has openings today. I called the patient and let him know and gave him the number to the [facility] office.

## 2022-08-26 NOTE — TELEPHONE ENCOUNTER
Patient of Donnell Fuentes was last seen on 7/27 for right knee pain. He is asking to be seen today as his pain has increased and states it needs to be drained. He states the steroid dose pack has not helped and this is affecting his everyday life. He is asking for a return call as soon as possible. Thank you.

## 2022-08-26 NOTE — TELEPHONE ENCOUNTER
Patient called [facility] to schedule with Teresa Erickson and was advise he could not hop around to different providers and was not able to be seen.  He called back very upset that he was advised to do that

## 2022-09-07 ENCOUNTER — HOSPITAL ENCOUNTER (OUTPATIENT)
Age: 52
Setting detail: SPECIMEN
Discharge: HOME | End: 2022-09-07

## 2022-09-07 DIAGNOSIS — R53.83 OTHER FATIGUE: ICD-10-CM

## 2022-09-07 DIAGNOSIS — M25.50 ARTHRALGIA, UNSPECIFIED JOINT: ICD-10-CM

## 2022-09-07 DIAGNOSIS — E16.2 HYPOGLYCEMIA: ICD-10-CM

## 2022-09-07 DIAGNOSIS — R35.1 NOCTURIA: ICD-10-CM

## 2022-09-07 DIAGNOSIS — E78.5 HYPERLIPIDEMIA, UNSPECIFIED HYPERLIPIDEMIA TYPE: ICD-10-CM

## 2022-09-07 DIAGNOSIS — E55.9 VITAMIN D DEFICIENCY: ICD-10-CM

## 2022-09-07 LAB
ABSOLUTE EOS #: 0.2 K/UL (ref 0–0.44)
ABSOLUTE IMMATURE GRANULOCYTE: 0.03 K/UL (ref 0–0.3)
ABSOLUTE LYMPH #: 2.22 K/UL (ref 1.1–3.7)
ABSOLUTE MONO #: 0.62 K/UL (ref 0.1–1.2)
ALT SERPL-CCNC: 55 U/L (ref 5–41)
ANION GAP SERPL CALCULATED.3IONS-SCNC: 12 MMOL/L (ref 9–17)
AST SERPL-CCNC: 36 U/L
BASOPHILS # BLD: 1 % (ref 0–2)
BASOPHILS ABSOLUTE: 0.05 K/UL (ref 0–0.2)
BUN BLDV-MCNC: 16 MG/DL (ref 6–20)
C-REACTIVE PROTEIN: <3 MG/L (ref 0–5)
CALCIUM SERPL-MCNC: 9.1 MG/DL (ref 8.6–10.4)
CHLORIDE BLD-SCNC: 101 MMOL/L (ref 98–107)
CHOLESTEROL/HDL RATIO: 5.1
CHOLESTEROL: 127 MG/DL
CO2: 26 MMOL/L (ref 20–31)
CREAT SERPL-MCNC: 0.96 MG/DL (ref 0.7–1.2)
EOSINOPHILS RELATIVE PERCENT: 3 % (ref 1–4)
ESTIMATED AVERAGE GLUCOSE: 163 MG/DL
GFR AFRICAN AMERICAN: >60 ML/MIN
GFR NON-AFRICAN AMERICAN: >60 ML/MIN
GFR SERPL CREATININE-BSD FRML MDRD: ABNORMAL ML/MIN/{1.73_M2}
GLUCOSE FASTING: 179 MG/DL (ref 70–99)
HBA1C MFR BLD: 7.3 % (ref 4–6)
HCT VFR BLD CALC: 41.9 % (ref 40.7–50.3)
HDLC SERPL-MCNC: 25 MG/DL
HEMOGLOBIN: 14.7 G/DL (ref 13–17)
IMMATURE GRANULOCYTES: 0 %
LDL CHOLESTEROL DIRECT: 30 MG/DL
LDL CHOLESTEROL: ABNORMAL MG/DL (ref 0–130)
LYMPHOCYTES # BLD: 29 % (ref 24–43)
MCH RBC QN AUTO: 31.3 PG (ref 25.2–33.5)
MCHC RBC AUTO-ENTMCNC: 35.1 G/DL (ref 28.4–34.8)
MCV RBC AUTO: 89.3 FL (ref 82.6–102.9)
MONOCYTES # BLD: 8 % (ref 3–12)
NRBC AUTOMATED: 0 PER 100 WBC
PDW BLD-RTO: 12.1 % (ref 11.8–14.4)
PLATELET # BLD: 239 K/UL (ref 138–453)
PMV BLD AUTO: 9.3 FL (ref 8.1–13.5)
POTASSIUM SERPL-SCNC: 3.5 MMOL/L (ref 3.7–5.3)
PROSTATE SPECIFIC ANTIGEN: 3.63 NG/ML
RBC # BLD: 4.69 M/UL (ref 4.21–5.77)
RHEUMATOID FACTOR: <10 IU/ML
SEDIMENTATION RATE, ERYTHROCYTE: <1 MM/HR (ref 0–20)
SEG NEUTROPHILS: 59 % (ref 36–65)
SEGMENTED NEUTROPHILS ABSOLUTE COUNT: 4.57 K/UL (ref 1.5–8.1)
SEX HORMONE BINDING GLOBULIN: 20 NMOL/L (ref 11–80)
SODIUM BLD-SCNC: 139 MMOL/L (ref 135–144)
TESTOSTERONE FREE-NONMALE: 49.9 PG/ML (ref 47–244)
TESTOSTERONE TOTAL: 202 NG/DL (ref 220–1000)
THYROXINE, FREE: 1.25 NG/DL (ref 0.93–1.7)
TRIGL SERPL-MCNC: 412 MG/DL
VITAMIN B-12: 398 PG/ML (ref 232–1245)
VITAMIN D 25-HYDROXY: 24.6 NG/ML
WBC # BLD: 7.7 K/UL (ref 3.5–11.3)

## 2022-09-10 ENCOUNTER — HOSPITAL ENCOUNTER (OUTPATIENT)
Age: 52
Discharge: HOME | End: 2022-09-12
Payer: COMMERCIAL

## 2022-09-10 ENCOUNTER — HOSPITAL ENCOUNTER (OUTPATIENT)
Dept: GENERAL RADIOLOGY | Age: 52
Discharge: HOME | End: 2022-09-12
Payer: COMMERCIAL

## 2022-09-10 DIAGNOSIS — M25.561 RIGHT KNEE PAIN, UNSPECIFIED CHRONICITY: ICD-10-CM

## 2022-09-10 PROCEDURE — 73564 X-RAY EXAM KNEE 4 OR MORE: CPT

## 2022-09-20 PROBLEM — E87.6 HYPOKALEMIA: Status: ACTIVE | Noted: 2022-09-20

## 2022-09-20 PROBLEM — Z79.4 TYPE 2 DIABETES MELLITUS WITHOUT COMPLICATION, WITH LONG-TERM CURRENT USE OF INSULIN (HCC): Status: ACTIVE | Noted: 2022-09-20

## 2022-09-20 PROBLEM — E78.2 ELEVATED TRIGLYCERIDES WITH HIGH CHOLESTEROL: Status: ACTIVE | Noted: 2022-09-20

## 2022-09-20 PROBLEM — E11.9 TYPE 2 DIABETES MELLITUS WITHOUT COMPLICATION, WITH LONG-TERM CURRENT USE OF INSULIN (HCC): Status: ACTIVE | Noted: 2022-09-20

## 2022-09-20 PROBLEM — E55.9 VITAMIN D DEFICIENCY: Status: ACTIVE | Noted: 2022-09-20

## 2022-09-20 PROBLEM — R74.8 LIVER ENZYME ELEVATION: Status: ACTIVE | Noted: 2022-09-20

## 2022-10-03 ENCOUNTER — HOSPITAL ENCOUNTER (OUTPATIENT)
Dept: MRI IMAGING | Age: 52
Discharge: HOME | End: 2022-10-05
Payer: COMMERCIAL

## 2022-10-03 DIAGNOSIS — M25.561 RIGHT KNEE PAIN, UNSPECIFIED CHRONICITY: ICD-10-CM

## 2022-10-03 PROCEDURE — 73721 MRI JNT OF LWR EXTRE W/O DYE: CPT

## 2022-10-20 ENCOUNTER — HOSPITAL ENCOUNTER (OUTPATIENT)
Dept: PREADMISSION TESTING | Age: 52
Discharge: HOME | End: 2022-10-24
Payer: COMMERCIAL

## 2022-10-20 VITALS
OXYGEN SATURATION: 95 % | SYSTOLIC BLOOD PRESSURE: 151 MMHG | DIASTOLIC BLOOD PRESSURE: 90 MMHG | TEMPERATURE: 97.5 F | HEART RATE: 77 BPM | RESPIRATION RATE: 14 BRPM | BODY MASS INDEX: 35.23 KG/M2 | HEIGHT: 76 IN | WEIGHT: 289.3 LBS

## 2022-10-20 LAB
ABO/RH: NORMAL
ABSOLUTE EOS #: 0.18 K/UL (ref 0–0.44)
ABSOLUTE IMMATURE GRANULOCYTE: 0.02 K/UL (ref 0–0.3)
ABSOLUTE LYMPH #: 2.01 K/UL (ref 1.1–3.7)
ABSOLUTE MONO #: 0.61 K/UL (ref 0.1–1.2)
ANION GAP SERPL CALCULATED.3IONS-SCNC: 11 MMOL/L (ref 9–17)
ANTIBODY SCREEN: NEGATIVE
ARM BAND NUMBER: NORMAL
BASOPHILS # BLD: 1 % (ref 0–2)
BASOPHILS ABSOLUTE: 0.04 K/UL (ref 0–0.2)
BUN BLDV-MCNC: 21 MG/DL (ref 6–20)
BUN/CREAT BLD: 16 (ref 9–20)
CALCIUM SERPL-MCNC: 9.6 MG/DL (ref 8.6–10.4)
CHLORIDE BLD-SCNC: 103 MMOL/L (ref 98–107)
CO2: 27 MMOL/L (ref 20–31)
CREAT SERPL-MCNC: 1.33 MG/DL (ref 0.7–1.2)
EKG ATRIAL RATE: 75 BPM
EKG P AXIS: 13 DEGREES
EKG P-R INTERVAL: 170 MS
EKG Q-T INTERVAL: 408 MS
EKG QRS DURATION: 86 MS
EKG QTC CALCULATION (BAZETT): 455 MS
EKG R AXIS: 9 DEGREES
EKG T AXIS: 16 DEGREES
EKG VENTRICULAR RATE: 75 BPM
EOSINOPHILS RELATIVE PERCENT: 3 % (ref 1–4)
ESTIMATED AVERAGE GLUCOSE: 134 MG/DL
EXPIRATION DATE: NORMAL
GFR SERPL CREATININE-BSD FRML MDRD: >60 ML/MIN/1.73M2
GLUCOSE BLD-MCNC: 119 MG/DL (ref 70–99)
HBA1C MFR BLD: 6.3 % (ref 4–6)
HCT VFR BLD CALC: 44 % (ref 40.7–50.3)
HEMOGLOBIN: 14.9 G/DL (ref 13–17)
IMMATURE GRANULOCYTES: 0 %
LYMPHOCYTES # BLD: 30 % (ref 24–43)
MCH RBC QN AUTO: 31.1 PG (ref 25.2–33.5)
MCHC RBC AUTO-ENTMCNC: 33.9 G/DL (ref 28.4–34.8)
MCV RBC AUTO: 91.9 FL (ref 82.6–102.9)
MONOCYTES # BLD: 9 % (ref 3–12)
NRBC AUTOMATED: 0 PER 100 WBC
PDW BLD-RTO: 12.2 % (ref 11.8–14.4)
PLATELET # BLD: 238 K/UL (ref 138–453)
PMV BLD AUTO: 8.9 FL (ref 8.1–13.5)
POTASSIUM SERPL-SCNC: 4.1 MMOL/L (ref 3.7–5.3)
RBC # BLD: 4.79 M/UL (ref 4.21–5.77)
SEG NEUTROPHILS: 57 % (ref 36–65)
SEGMENTED NEUTROPHILS ABSOLUTE COUNT: 3.78 K/UL (ref 1.5–8.1)
SODIUM BLD-SCNC: 141 MMOL/L (ref 135–144)
WBC # BLD: 6.6 K/UL (ref 3.5–11.3)

## 2022-10-20 PROCEDURE — 85025 COMPLETE CBC W/AUTO DIFF WBC: CPT

## 2022-10-20 PROCEDURE — 83036 HEMOGLOBIN GLYCOSYLATED A1C: CPT

## 2022-10-20 PROCEDURE — 86901 BLOOD TYPING SEROLOGIC RH(D): CPT

## 2022-10-20 PROCEDURE — 80048 BASIC METABOLIC PNL TOTAL CA: CPT

## 2022-10-20 PROCEDURE — 87641 MR-STAPH DNA AMP PROBE: CPT

## 2022-10-20 PROCEDURE — 36415 COLL VENOUS BLD VENIPUNCTURE: CPT

## 2022-10-20 PROCEDURE — 86900 BLOOD TYPING SEROLOGIC ABO: CPT

## 2022-10-20 PROCEDURE — 93005 ELECTROCARDIOGRAM TRACING: CPT | Performed by: ANESTHESIOLOGY

## 2022-10-20 PROCEDURE — 86850 RBC ANTIBODY SCREEN: CPT

## 2022-10-20 RX ORDER — ACETAMINOPHEN 500 MG
1000 TABLET ORAL ONCE
Status: CANCELLED | OUTPATIENT
Start: 2022-11-10

## 2022-10-20 RX ORDER — CELECOXIB 200 MG/1
200 CAPSULE ORAL ONCE
Status: CANCELLED | OUTPATIENT
Start: 2022-11-10

## 2022-10-20 RX ORDER — CHLORAL HYDRATE 500 MG
CAPSULE ORAL DAILY
Status: ON HOLD | COMMUNITY
End: 2022-11-10 | Stop reason: HOSPADM

## 2022-10-20 RX ORDER — GABAPENTIN 300 MG/1
300 CAPSULE ORAL ONCE
Status: CANCELLED | OUTPATIENT
Start: 2022-11-10

## 2022-10-20 ASSESSMENT — PROMIS GLOBAL HEALTH SCALE
TO WHAT EXTENT ARE YOU ABLE TO CARRY OUT YOUR EVERYDAY PHYSICAL ACTIVITIES SUCH AS WALKING, CLIMBING STAIRS, CARRYING GROCERIES, OR MOVING A CHAIR [ON A SCALE OF 1 (NOT AT ALL) TO 5 (COMPLETELY)]?: 4
SUM OF RESPONSES TO QUESTIONS 2, 4, 5, & 10: 12
IN GENERAL, WOULD YOU SAY YOUR QUALITY OF LIFE IS...[ON A SCALE OF 1 (POOR) TO 5 (EXCELLENT)]: 3
IN THE PAST 7 DAYS, HOW WOULD YOU RATE YOUR FATIGUE ON AVERAGE [ON A SCALE FROM 1 (NONE) TO 5 (VERY SEVERE)]?: 3
IN GENERAL, HOW WOULD YOU RATE YOUR PHYSICAL HEALTH [ON A SCALE OF 1 (POOR) TO 5 (EXCELLENT)]?: 2
IN GENERAL, HOW WOULD YOU RATE YOUR SATISFACTION WITH YOUR SOCIAL ACTIVITIES AND RELATIONSHIPS [ON A SCALE OF 1 (POOR) TO 5 (EXCELLENT)]?: 4
IN GENERAL, WOULD YOU SAY YOUR HEALTH IS...[ON A SCALE OF 1 (POOR) TO 5 (EXCELLENT)]: 2
SUM OF RESPONSES TO QUESTIONS 3, 6, 7, & 8: 15
IN THE PAST 7 DAYS, HOW WOULD YOU RATE YOUR PAIN ON AVERAGE [ON A SCALE FROM 0 (NO PAIN) TO 10 (WORST IMAGINABLE PAIN)]?: 6
IN THE PAST 7 DAYS, HOW OFTEN HAVE YOU BEEN BOTHERED BY EMOTIONAL PROBLEMS, SUCH AS FEELING ANXIOUS, DEPRESSED, OR IRRITABLE [ON A SCALE FROM 1 (NEVER) TO 5 (ALWAYS)]?: 2
IN GENERAL, HOW WOULD YOU RATE YOUR MENTAL HEALTH, INCLUDING YOUR MOOD AND YOUR ABILITY TO THINK [ON A SCALE OF 1 (POOR) TO 5 (EXCELLENT)]?: 3
IN GENERAL, PLEASE RATE HOW WELL YOU CARRY OUT YOUR USUAL SOCIAL ACTIVITIES (INCLUDES ACTIVITIES AT HOME, AT WORK, AND IN YOUR COMMUNITY, AND RESPONSIBILITIES AS A PARENT, CHILD, SPOUSE, EMPLOYEE, FRIEND, ETC) [ON A SCALE OF 1 (POOR) TO 5 (EXCELLENT)]?: 4

## 2022-10-20 ASSESSMENT — KOOS JR
HOW SEVERE IS YOUR KNEE STIFFNESS AFTER FIRST WAKING IN MORNING: 2
RISING FROM SITTING: 1
TWISING OR PIVOTING ON KNEE: 2
KOOS JR TOTAL INTERVAL SCORE: 65.994
STANDING UPRIGHT: 1
BENDING TO THE FLOOR TO PICK UP OBJECT: 0
STRAIGHTENING KNEE FULLY: 1
GOING UP OR DOWN STAIRS: 1

## 2022-10-20 ASSESSMENT — PAIN SCALES - GENERAL: PAINLEVEL_OUTOF10: 4

## 2022-10-20 ASSESSMENT — PAIN DESCRIPTION - ORIENTATION: ORIENTATION: RIGHT

## 2022-10-20 ASSESSMENT — PAIN DESCRIPTION - DESCRIPTORS: DESCRIPTORS: DULL

## 2022-10-20 ASSESSMENT — PAIN DESCRIPTION - PAIN TYPE: TYPE: CHRONIC PAIN

## 2022-10-20 ASSESSMENT — PAIN DESCRIPTION - LOCATION: LOCATION: KNEE

## 2022-10-20 NOTE — PRE-PROCEDURE INSTRUCTIONS
On the Day of Your Surgery, Thursday, November 10, 2022, Please Arrive At 5:45 AM     Enter the hospital through the Main Entrance, take the lobby elevators to the second floor and check in at the Surgery Registration desk. Continue to take your home medications as you normally do up to and including the night before surgery with the exception of blood thinning medications. Blood Thinning Medications:  Please stop prescription blood thinning medications such as Apixaban (Eliquis); Clopidogrel (Plavix); Dabigatran (Pradaxa); Prasugrel (Effient); Rivaroxaban (Xarelto); Ticagrelor (Brilinta); Warfarin (Coumadin) only as directed by your surgeon and/or the prescribing physician    Some common examples of other medications that can thin your blood are: Aspirin, Ibuprofen (Advil, Motrin), Naproxen (Aleve), Meloxicam (Mobic), Celecoxib (Celebrex), Fish Oil, many Herbal Supplements. These medications should usually be stopped at least 7 days prior to surgery. Please stop taking meloxicam, fish oil, and multivitamin 7 days prior to surgery (11/3/22). Please ask Dr. Maurilio Kern when to stop taking aspirin prior to surgery. Tylenol is OK to take for pain the week prior to surgery. Failure to stop certain medications may interfere with your scheduled surgery. If you receive instructions from your surgeon regarding what medications to stop prior to surgery, please follow those specific instructions. If You Have Diabetes:  Do not take any of your diabetic medications, (injectables or by mouth) the morning of surgery unless otherwise instructed by the doctor who manages your diabetes. If you are taking insulin, contact the doctor the manages your diabetes for instructions about any changes to your insulin dosages the day before surgery. Please take the following medication(s) the day of surgery with small sips of water:              Labetalol, amlodipine    Showering Before Surgery:      You can play an important role in your own health by carefully washing before surgery. Shower the night before and the morning of surgery using the instructions below. If you are allergic to Chlorhexidine Gluconate (CHG) use antibacterial soap instead. If you were given Chlorhexidine soap, please follow the instructions included with the soap to shower the night before and the morning of surgery. If you were not given Chlorhexidine, please shower or bathe the morning of surgery using an antibacterial soap. Please dress in clean clothing after showering. General information about Chlorhexidine Gluconate (CHG)   * It should not be used on hair, face, ears, genital area or skin that is not intact. * It should not be used if breast feeding. * It should not be used if you allergic to CHG. * See the bottle for additional information. Do Not apply any powder, deodorant, lotion/cream/oil, perfume/aftershave, cosmetics, or alcohol-based skin or hair products after showering. Do Not shave near the planned surgical site unless specifically instructed to.     1. Wash your hair using your normal shampoo and rinse. 2. Wash your face and genital area (privates) using your own shampoo and rinse. 3. Turn off the shower water and pour half of the bottle of CHG onto a clean washcloth. 4. Wash your body from neck down to toes. Pay special attention to your surgical site. 5. Leave the CHG on your skin for 5 minutes and rinse it off.   6. Pat dry with a clean towel, sleep in clean clothes and clean sheets. 7. Do not shave near the surgical site. 8. Repeat process the morning of surgery. CHG may cause dry skin, but should not cause redness, rash, or intense itching. If an suspect an allergic reaction, use your own soap and shampoo for the morning of surgery and report reaction to the pre-operative nurse. Additional Instructions:      1.  If you are having any type of anesthesia, you are to have NOTHING to eat or drink after midnight the night before surgery. This includes no gum, hard candy, mints or water. The only exception to this is small sips of water to take the medications listed above. No smoking or chewing tobacco after midnight. No alcoholic beverages for 24 hours prior to surgery. 2. Brush your teeth but do not swallow any water. 3. If you wear glasses bring a case for them if you have one. No contacts should be worn the day of surgery. You may also bring your hearing aids. If you have dentures, most surgical procedures involving anesthesia will require that you remove them prior to surgery. 4. If you sleep with a CPAP or BiPAP machine at home and plan on staying in the hospital overnight after surgery, please bring your machine with you. 5. Do not wear any jewelry or body piercings the day of surgery. No nail polish on the operative extremity (arm/hand or leg/foot surgeries)   6. If you are staying overnight with us, you may bring a small bag of necessary personal items. 7. Please wear loose, comfortable clothing. If you are potentially going to have a cast, sling, brace or bulky dressing, make sure to wear clothing that will fit over it. 8. In case of illness - If you have cold or flu like symptoms (high fever, runny nose, sore throat, cough, etc.) rash, nausea, vomiting, loose stools, and/or recent contact with someone who has a contagious disease (chicken pox, measles, COVID-19, etc.). Please call your surgeon before coming to the hospital.    Transportation After Your Surgery/Procedure: If you are going home the same day of surgery you need someone to drive you home. Your  must be at least 25years of age. A taxi cab or other nonmedical public transportation is not acceptable unless you have someone to ride home in the vehicle with you. For your safety, someone must remain with you for the first 24 hours after your surgery if you receive anesthesia or medication.   If you do not have someone to stay with you, your procedure may be cancelled. As a patient at [address] you can expect quality medical and nursing care that is centered on you individual needs. Our goal is to make your surgical experience as comfortable as possible.     Any questions about preparing for your surgery please call [phone].      ____________________________   ____________________________  Signature (Patient)                                 Signature (Nurse)                     Date

## 2022-10-20 NOTE — H&P (VIEW-ONLY)
History and Physical Service   [facility] - INPATIENT    HISTORY AND PHYSICAL EXAMINATION            Date of Evaluation: 10/20/2022  Patient name:  Erick Pemberton  MRN:   [id number]  YOB: 1970  PCP:    Cara Myers MD    History Obtained From:     Patient, medical records    History of Present Illness: This is Erick Pemberton a 45 y.o. male who presents for a pre-admission testing appointment for an upcoming [address] [facility] by Yeni Galeas MD scheduled on 11/10/2022 at 0730 due to Primary osteoarthritis of one knee, right [M17.11]. The patient's chief complaint is right knee pain which has progressively worsened over the past 3-4 months. Patient has clicking, grinding, popping in the knee. He states knee has buckled but denies any falls. Right knee pain is aggravated by walking up stairs, long periods of ambulation and is minimally relieved with Meloxicam, Ibuprofen, Tylenol. Prior treatment includes cortisone injection 3-4 months ago. History of hypertension, hyperlipidemia, atrial fibrillation (with cardioversion and ablation), sleep apnea, diabetes, loop recorder. Patient follows with cardiologist Dr. Michael Laura - he has upcoming appointment on 11/7/2022. MRI right knee WO contrast 10/3/2022:  Impression   1. Volume loss of the medial meniscus which could be related to prior partial   meniscectomy. Superimposed complex multidirectional tearing in the residual   posterior horn and body of the medial meniscus with outward extrusion. 2. Lateral meniscus degeneration. 3. Moderate ACL ganglion formation. 4. Mild proximal popliteus tendinosis. 5. Large joint effusion. 6. Tricompartmental osteoarthrosis. Moderate to severe medial compartment   chondromalacia. Mild-to-moderate chondromalacia of the inner lateral   compartment with superimposed partial and full-thickness fissuring.    Mild-to-moderate patellofemoral chondromalacia with multifocal superimposed   partial and full-thickness fissuring. 7. Moderate complex Baker's cyst.      Latest Reference Range & Units 9/7/22 07:35 10/20/22 08:33   BUN,BUNPL 6 - 20 mg/dL 16 21 (H)   Creatinine 0.70 - 1.20 mg/dL 0.96 1.33 (H)   (H): Data is abnormally high  No previous history of kidney disease. Patient states urinary frequency when taking his diuretics, otherwise asymptomatic. Functional Capacity:  1) Pt is able to walk 2 city blocks on level ground without SOB. 2) Pt is able to climb 2 flights of stairs without SOB. 3) Pt is able to walk up a hill for 1-2 city blocks without SOB. Past Medical History:     Past Medical History:   Diagnosis Date    Arthritis     Atrial fibrillation ([address]     COVID-19 11/2021    Mild per pt., no treatment    Diabetes mellitus ([address]     Type 2, does not check sugars at home, on Metformin, managed by PCP (10/20/22)    Hyperlipidemia     Hypertension     Neuropathy     Tingling in both feet at night per pt. Skin cancer     Sleep apnea     Wears CPAP        Past Surgical History:     Past Surgical History:   Procedure Laterality Date    ABLATION OF DYSRHYTHMIC FOCUS  07/2019    CARDIOVERSION  06/07/2019    CARDIOVERSION  07/01/2019    by Dr. Ruperto Silva. 1 shock @ 200 converted to NSR    COLONOSCOPY      INSERTABLE CARDIAC MONITOR  2019    Loop recorder in place (10/20/22)    KNEE ARTHROSCOPY Bilateral     MOHS SURGERY      1st stage head and neck    SEPTOPLASTY Bilateral 11/29/2021    SEPTOPLASTY BILATERAL TURBINATE REDUCTION performed by Roberta Garcia MD at [facility]      TRANSESOPHAGEAL ECHOCARDIOGRAM  06/07/2019    VEIN SURGERY Left         Medications Prior to Admission:     Prior to Admission medications    Medication Sig Start Date End Date Taking?  Authorizing Provider   Omega-3 Fatty Acids (FISH OIL) 1000 MG capsule Take by mouth daily   Yes Historical Provider, MD   Magnesium 100 MG CAPS Take by mouth daily   Yes Historical Provider, MD   ASPIRIN 81 PO Take 81 mg by mouth daily   Yes Historical Provider, MD   meloxicam (MOBIC) 15 MG tablet Take 15 mg by mouth daily 9/19/22   Historical Provider, MD   metFORMIN (GLUCOPHAGE-XR) 500 MG extended release tablet Take 1 tablet by mouth daily (with breakfast) 9/20/22   Daisy Layton MD   vitamin D (ERGOCALCIFEROL) 1.25 MG (67852 UT) CAPS capsule Take 1 capsule by mouth once a week x8 weeks 9/20/22   Daisy Layton MD   potassium chloride (KLOR-CON M) 10 MEQ extended release tablet Take 1 tablet by mouth daily 9/20/22   Daisy Layton MD   amLODIPine (NORVASC) 10 MG tablet Take 10 mg by mouth daily 5/19/22   Historical Provider, MD   labetalol (NORMODYNE) 200 MG tablet Take 200 mg by mouth 2 times daily    Historical Provider, MD   hydroCHLOROthiazide (HYDRODIURIL) 12.5 MG tablet Take 12.5 mg by mouth daily    Historical Provider, MD   acetaminophen (TYLENOL) 500 MG tablet Take 500 mg by mouth every 6 hours as needed for Pain    Historical Provider, MD   Multiple Vitamins-Minerals (MULTIVITAMIN ADULT PO) Take 1 tablet by mouth daily     Historical Provider, MD   rosuvastatin (CRESTOR) 10 MG tablet Take 10 mg by mouth daily    Historical Provider, MD        Allergies:     Patient has no known allergies. Social History:     Tobacco:    reports that he has never smoked. He has never used smokeless tobacco.  Alcohol:      reports current alcohol use of about 3.3 standard drinks per week. Drug Use:  reports no history of drug use. Family History:     Family History   Problem Relation Age of Onset    Alzheimer's Disease Mother        Review of Systems:     Positive and Negative as described in HPI. CONSTITUTIONAL:  Negative for fevers, chills, sweats, fatigue, and weight loss. HEENT:  Negative for glasses, hearing changes, rhinorrhea, and throat pain. RESPIRATORY: DINESH with CPAP. Negative for shortness of breath, cough, congestion, and wheezing. CARDIOVASCULAR: HTN. HLD. Atrial fibrillation with ablation. Negative for chest pain, blood clot, irregular heartbeat, and palpitations. GASTROINTESTINAL: Occasional acid reflux. Negative for nausea, vomiting, diarrhea, constipation, change in bowel habits, and abdominal pain. GENITOURINARY: Urinary frequency with diuretics. Negative for difficulty of urination, burning with urination. INTEGUMENT:  Negative for rash, skin lesions, and easy bruising. Instructed pt to call Dr. Meliton Mims as soon as possible if a rash or wound develops prior to surgery. Pt voiced understanding. HEMATOLOGIC/LYMPHATIC: Right knee swelling. ALLERGIC/IMMUNOLOGIC: Negative for urticaria and itching. ENDOCRINE: Diabetes - managed by PCP Dr. Verena Bumpers. Negative for increase in thirst, increase in urination, and heat or cold intolerance. MUSCULOSKELETAL: See HPI. NEUROLOGICAL: Numbness and tingling in bilateral feet at night. Negative for headaches, dizziness, lightheadedness  BEHAVIOR/PSYCH: Negative for depression and anxiety. Physical Exam:   BP (!) 151/90   Pulse 77   Temp 97.5 °F (36.4 °C) (Infrared)   Resp 14   Ht 6' 4\" (1.93 m)   Wt 289 lb 4.8 oz (131.2 kg)   SpO2 95%   BMI 35.21 kg/m²   No LMP for male patient. No obstetric history on file. No results for input(s): POCGLU in the last 72 hours. General Appearance:  Alert, well appearing, and in no acute distress. Mental status:  Oriented to person, place, and time. Head:  Normocephalic and atraumatic. Eye:  No icterus, redness, pupils equal and reactive, extraocular eye movements intact, and conjunctiva clear. Ear:  Hearing grossly intact. Nose:  No drainage noted. Mouth:  Mucous membranes moist.  Neck:  Supple and no carotid bruits noted. Lungs:  Bilateral equal air entry, clear to auscultation, no wheezing, rales or rhonchi, and normal effort. Cardiovascular:  Normal rate, regular rhythm, no murmur, gallop, or rub.   Abdomen:  Soft, non-tender, non-distended, and active bowel sounds. Neurologic:  Normal speech and cranial nerves II through XII grossly intact. Strength 5/5 bilaterally. Skin:  No gross lesions, rashes, bruising, or bleeding on exposed skin area. Extremities:  Posterior tibial pulses 2+ bilaterally. No pedal edema. No calf tenderness with palpation. Psych:  Normal affect.      Investigations:      Laboratory Testing:  Recent Results (from the past 24 hour(s))   Basic Metabolic Panel    Collection Time: 10/20/22  8:33 AM   Result Value Ref Range    Glucose 119 (H) 70 - 99 mg/dL    BUN 21 (H) 6 - 20 mg/dL    Creatinine 1.33 (H) 0.70 - 1.20 mg/dL    Est, Glom Filt Rate >60 >60 mL/min/1.73m2    Bun/Cre Ratio 16 9 - 20    Calcium 9.6 8.6 - 10.4 mg/dL    Sodium 141 135 - 144 mmol/L    Potassium 4.1 3.7 - 5.3 mmol/L    Chloride 103 98 - 107 mmol/L    CO2 27 20 - 31 mmol/L    Anion Gap 11 9 - 17 mmol/L   CBC with Auto Differential    Collection Time: 10/20/22  8:33 AM   Result Value Ref Range    WBC 6.6 3.5 - 11.3 k/uL    RBC 4.79 4.21 - 5.77 m/uL    Hemoglobin 14.9 13.0 - 17.0 g/dL    Hematocrit 44.0 40.7 - 50.3 %    MCV 91.9 82.6 - 102.9 fL    MCH 31.1 25.2 - 33.5 pg    MCHC 33.9 28.4 - 34.8 g/dL    RDW 12.2 11.8 - 14.4 %    Platelets 341 858 - 767 k/uL    MPV 8.9 8.1 - 13.5 fL    NRBC Automated 0.0 0.0 per 100 WBC    Seg Neutrophils 57 36 - 65 %    Lymphocytes 30 24 - 43 %    Monocytes 9 3 - 12 %    Eosinophils % 3 1 - 4 %    Basophils 1 0 - 2 %    Immature Granulocytes 0 0 %    Segs Absolute 3.78 1.50 - 8.10 k/uL    Absolute Lymph # 2.01 1.10 - 3.70 k/uL    Absolute Mono # 0.61 0.10 - 1.20 k/uL    Absolute Eos # 0.18 0.00 - 0.44 k/uL    Basophils Absolute 0.04 0.00 - 0.20 k/uL    Absolute Immature Granulocyte 0.02 0.00 - 0.30 k/uL   Hemoglobin A1C    Collection Time: 10/20/22  8:33 AM   Result Value Ref Range    Hemoglobin A1C 6.3 (H) 4.0 - 6.0 %    Estimated Avg Glucose 134 mg/dL   TYPE AND SCREEN    Collection Time: 10/20/22  8:33 AM   Result Value Ref Range    Expiration Date 11/13/2022,2359     Arm Band Number BE [id number]     ABO/Rh B POSITIVE     Antibody Screen NEGATIVE    EKG 12 Lead    Collection Time: 10/20/22  9:04 AM   Result Value Ref Range    Ventricular Rate 75 BPM    Atrial Rate 75 BPM    P-R Interval 170 ms    QRS Duration 86 ms    Q-T Interval 408 ms    QTc Calculation (Bazett) 455 ms    P Axis 13 degrees    R Axis 9 degrees    T Axis 16 degrees       Recent Labs     10/20/22  0833   HGB 14.9   HCT 44.0   WBC 6.6   MCV 91.9      K 4.1      CO2 27   BUN 21*   CREATININE 1.33*   GLUCOSE 119*       No results for input(s): COVID19 in the last 720 hours. *Please note that labs listed above are the most recent lab values available in EPIC at the time of the visit and additional labs may have been drawn or resulted since that time. Imaging/Diagnostics:    MRI KNEE RIGHT WO CONTRAST    Result Date: 10/3/2022  EXAMINATION: MRI OF THE RIGHT KNEE WITHOUT CONTRAST, 10/3/2022 7:29 am TECHNIQUE: Multiplanar multisequence MRI of the right knee was performed without the administration of intravenous contrast. COMPARISON: Right knee plain radiographs from 09/10/2022 HISTORY: ORDERING SYSTEM PROVIDED HISTORY: Right knee pain, unspecified chronicity TECHNOLOGIST PROVIDED HISTORY: Reason for Exam: PT STATES PX SURGERY, PAIN AND SWELLING FOR MONTHS, NKI 69-year-old male who complains of right knee pain and swelling for months; no known injury FINDINGS: MENISCI: Complex multidirectional tearing and volume loss throughout the posterior horn and body of the medial meniscus. Volume loss of the medial meniscus could be related to underlying subjacent partial meniscectomy. Outward extrusion of the residual medial meniscus. Degeneration of the lateral meniscus. No discrete lateral meniscus tear. CRUCIATE LIGAMENTS: Moderate ACL ganglion formation. ACL fibers appear continuous/intact. Posterior cruciate ligament appears intact.  EXTENSOR MECHANISM: Distal quadriceps tendon, patellar tendon, and patellar retinacula appear intact. LATERAL COLLATERAL LIGAMENT COMPLEX: Mild tendinosis of the proximal popliteus tendon. Iliotibial band, lateral collateral ligament, and biceps femoris appear intact. MEDIAL COLLATERAL LIGAMENT COMPLEX: Medial collateral ligament complex appears intact. KNEE JOINT: Large joint effusion. Osseous alignment is normal. No acute fracture or dislocation. Mild tricompartmental osteophyte spurring. Diffuse grade 3-4 medial compartment chondromalacia. Grade 2-3 chondromalacia of the inner aspect of the lateral compartment with superimposed partial and full-thickness fissuring. Grade 2-3 patellofemoral chondromalacia with multifocal superimposed partial and full-thickness fissuring. BONE MARROW: Subcortical cystic changes subjacent to the tibial spine. Bone marrow signal intensity within the visualized osseous structures otherwise within normal limits. SOFT TISSUES: Mild circumferential edema in the subcutaneous fat about the knee. Moderate complex Baker's cyst.     1. Volume loss of the medial meniscus which could be related to prior partial meniscectomy. Superimposed complex multidirectional tearing in the residual posterior horn and body of the medial meniscus with outward extrusion. 2. Lateral meniscus degeneration. 3. Moderate ACL ganglion formation. 4. Mild proximal popliteus tendinosis. 5. Large joint effusion. 6. Tricompartmental osteoarthrosis. Moderate to severe medial compartment chondromalacia. Mild-to-moderate chondromalacia of the inner lateral compartment with superimposed partial and full-thickness fissuring. Mild-to-moderate patellofemoral chondromalacia with multifocal superimposed partial and full-thickness fissuring. 7. Moderate complex Baker's cyst.       EKG: 10/20/2022: See Epic. Diagnosis:      1. Primary osteoarthritis of one knee, right [M17.11]. Plans:     1.  RIGHT KNEE TOTAL ARTHROPLASTY - [address] [facility], APRN - CNP  10/20/2022  2:09 PM

## 2022-10-20 NOTE — H&P
History and Physical Service   [facility]    HISTORY AND PHYSICAL EXAMINATION            Date of Evaluation: 10/20/2022  Patient name:  Cristian Cardona  MRN:   [id number]  YOB: 1970  PCP:    Alaina Johnson MD    History Obtained From:     Patient, medical records    History of Present Illness: This is Cristian Cardona a 45 y.o. male who presents for a pre-admission testing appointment for an upcoming [address] [facility] by Shazia Parker MD scheduled on 11/10/2022 at 0730 due to Primary osteoarthritis of one knee, right [M17.11]. The patient's chief complaint is right knee pain which has progressively worsened over the past 3-4 months. Patient has clicking, grinding, popping in the knee. He states knee has buckled but denies any falls. Right knee pain is aggravated by walking up stairs, long periods of ambulation and is minimally relieved with Meloxicam, Ibuprofen, Tylenol. Prior treatment includes cortisone injection 3-4 months ago. History of hypertension, hyperlipidemia, atrial fibrillation (with cardioversion and ablation), sleep apnea, diabetes, loop recorder. Patient follows with cardiologist Dr. Peterson Samuel - he has upcoming appointment on 11/7/2022. MRI right knee WO contrast 10/3/2022:  Impression   1. Volume loss of the medial meniscus which could be related to prior partial   meniscectomy. Superimposed complex multidirectional tearing in the residual   posterior horn and body of the medial meniscus with outward extrusion. 2. Lateral meniscus degeneration. 3. Moderate ACL ganglion formation. 4. Mild proximal popliteus tendinosis. 5. Large joint effusion. 6. Tricompartmental osteoarthrosis. Moderate to severe medial compartment   chondromalacia. Mild-to-moderate chondromalacia of the inner lateral   compartment with superimposed partial and full-thickness fissuring.    Mild-to-moderate patellofemoral chondromalacia with multifocal superimposed   partial and full-thickness fissuring. 7. Moderate complex Baker's cyst.      Latest Reference Range & Units 9/7/22 07:35 10/20/22 08:33   BUN,BUNPL 6 - 20 mg/dL 16 21 (H)   Creatinine 0.70 - 1.20 mg/dL 0.96 1.33 (H)   (H): Data is abnormally high  No previous history of kidney disease. Patient states urinary frequency when taking his diuretics, otherwise asymptomatic. Functional Capacity:  1) Pt is able to walk 2 city blocks on level ground without SOB. 2) Pt is able to climb 2 flights of stairs without SOB. 3) Pt is able to walk up a hill for 1-2 city blocks without SOB. Past Medical History:     Past Medical History:   Diagnosis Date    Arthritis     Atrial fibrillation ([address]     COVID-19 11/2021    Mild per pt., no treatment    Diabetes mellitus ([address]     Type 2, does not check sugars at home, on Metformin, managed by PCP (10/20/22)    Hyperlipidemia     Hypertension     Neuropathy     Tingling in both feet at night per pt. Skin cancer     Sleep apnea     Wears CPAP        Past Surgical History:     Past Surgical History:   Procedure Laterality Date    ABLATION OF DYSRHYTHMIC FOCUS  07/2019    CARDIOVERSION  06/07/2019    CARDIOVERSION  07/01/2019    by Dr. Wade Beckett. 1 shock @ 200 converted to NSR    COLONOSCOPY      INSERTABLE CARDIAC MONITOR  2019    Loop recorder in place (10/20/22)    KNEE ARTHROSCOPY Bilateral     MOHS SURGERY      1st stage head and neck    SEPTOPLASTY Bilateral 11/29/2021    SEPTOPLASTY BILATERAL TURBINATE REDUCTION performed by Roderick Weber MD at [address] [place] [address]      TRANSESOPHAGEAL ECHOCARDIOGRAM  06/07/2019    VEIN SURGERY Left         Medications Prior to Admission:     Prior to Admission medications    Medication Sig Start Date End Date Taking?  Authorizing Provider   Omega-3 Fatty Acids (FISH OIL) 1000 MG capsule Take by mouth daily   Yes Historical Provider, MD   Magnesium 100 MG CAPS Take by mouth daily   Yes Historical Provider, MD   ASPIRIN 81 PO Take 81 mg by mouth daily   Yes Historical Provider, MD   meloxicam (MOBIC) 15 MG tablet Take 15 mg by mouth daily 9/19/22   Historical Provider, MD   metFORMIN (GLUCOPHAGE-XR) 500 MG extended release tablet Take 1 tablet by mouth daily (with breakfast) 9/20/22   Ant Gamboa MD   vitamin D (ERGOCALCIFEROL) 1.25 MG (76056 UT) CAPS capsule Take 1 capsule by mouth once a week x8 weeks 9/20/22   Ant Gamboa MD   potassium chloride (KLOR-CON M) 10 MEQ extended release tablet Take 1 tablet by mouth daily 9/20/22   Ant Gamboa MD   amLODIPine (NORVASC) 10 MG tablet Take 10 mg by mouth daily 5/19/22   Historical Provider, MD   labetalol (NORMODYNE) 200 MG tablet Take 200 mg by mouth 2 times daily    Historical Provider, MD   hydroCHLOROthiazide (HYDRODIURIL) 12.5 MG tablet Take 12.5 mg by mouth daily    Historical Provider, MD   acetaminophen (TYLENOL) 500 MG tablet Take 500 mg by mouth every 6 hours as needed for Pain    Historical Provider, MD   Multiple Vitamins-Minerals (MULTIVITAMIN ADULT PO) Take 1 tablet by mouth daily     Historical Provider, MD   rosuvastatin (CRESTOR) 10 MG tablet Take 10 mg by mouth daily    Historical Provider, MD        Allergies:     Patient has no known allergies. Social History:     Tobacco:    reports that he has never smoked. He has never used smokeless tobacco.  Alcohol:      reports current alcohol use of about 3.3 standard drinks per week. Drug Use:  reports no history of drug use. Family History:     Family History   Problem Relation Age of Onset    Alzheimer's Disease Mother        Review of Systems:     Positive and Negative as described in HPI. CONSTITUTIONAL:  Negative for fevers, chills, sweats, fatigue, and weight loss. HEENT:  Negative for glasses, hearing changes, rhinorrhea, and throat pain. RESPIRATORY: DINESH with CPAP. Negative for shortness of breath, cough, congestion, and wheezing. CARDIOVASCULAR: HTN. HLD. Atrial fibrillation with ablation. Negative for chest pain, blood clot, irregular heartbeat, and palpitations. GASTROINTESTINAL: Occasional acid reflux. Negative for nausea, vomiting, diarrhea, constipation, change in bowel habits, and abdominal pain. GENITOURINARY: Urinary frequency with diuretics. Negative for difficulty of urination, burning with urination. INTEGUMENT:  Negative for rash, skin lesions, and easy bruising. Instructed pt to call Dr. Jewel Burns as soon as possible if a rash or wound develops prior to surgery. Pt voiced understanding. HEMATOLOGIC/LYMPHATIC: Right knee swelling. ALLERGIC/IMMUNOLOGIC: Negative for urticaria and itching. ENDOCRINE: Diabetes - managed by PCP Dr. Junette Cranker. Negative for increase in thirst, increase in urination, and heat or cold intolerance. MUSCULOSKELETAL: See HPI. NEUROLOGICAL: Numbness and tingling in bilateral feet at night. Negative for headaches, dizziness, lightheadedness  BEHAVIOR/PSYCH: Negative for depression and anxiety. Physical Exam:   BP (!) 151/90   Pulse 77   Temp 97.5 °F (36.4 °C) (Infrared)   Resp 14   Ht 6' 4\" (1.93 m)   Wt 289 lb 4.8 oz (131.2 kg)   SpO2 95%   BMI 35.21 kg/m²   No LMP for male patient. No obstetric history on file. No results for input(s): POCGLU in the last 72 hours. General Appearance:  Alert, well appearing, and in no acute distress. Mental status:  Oriented to person, place, and time. Head:  Normocephalic and atraumatic. Eye:  No icterus, redness, pupils equal and reactive, extraocular eye movements intact, and conjunctiva clear. Ear:  Hearing grossly intact. Nose:  No drainage noted. Mouth:  Mucous membranes moist.  Neck:  Supple and no carotid bruits noted. Lungs:  Bilateral equal air entry, clear to auscultation, no wheezing, rales or rhonchi, and normal effort. Cardiovascular:  Normal rate, regular rhythm, no murmur, gallop, or rub.   Abdomen:  Soft, non-tender, non-distended, and active bowel sounds. Neurologic:  Normal speech and cranial nerves II through XII grossly intact. Strength 5/5 bilaterally. Skin:  No gross lesions, rashes, bruising, or bleeding on exposed skin area. Extremities:  Posterior tibial pulses 2+ bilaterally. No pedal edema. No calf tenderness with palpation. Psych:  Normal affect.      Investigations:      Laboratory Testing:  Recent Results (from the past 24 hour(s))   Basic Metabolic Panel    Collection Time: 10/20/22  8:33 AM   Result Value Ref Range    Glucose 119 (H) 70 - 99 mg/dL    BUN 21 (H) 6 - 20 mg/dL    Creatinine 1.33 (H) 0.70 - 1.20 mg/dL    Est, Glom Filt Rate >60 >60 mL/min/1.73m2    Bun/Cre Ratio 16 9 - 20    Calcium 9.6 8.6 - 10.4 mg/dL    Sodium 141 135 - 144 mmol/L    Potassium 4.1 3.7 - 5.3 mmol/L    Chloride 103 98 - 107 mmol/L    CO2 27 20 - 31 mmol/L    Anion Gap 11 9 - 17 mmol/L   CBC with Auto Differential    Collection Time: 10/20/22  8:33 AM   Result Value Ref Range    WBC 6.6 3.5 - 11.3 k/uL    RBC 4.79 4.21 - 5.77 m/uL    Hemoglobin 14.9 13.0 - 17.0 g/dL    Hematocrit 44.0 40.7 - 50.3 %    MCV 91.9 82.6 - 102.9 fL    MCH 31.1 25.2 - 33.5 pg    MCHC 33.9 28.4 - 34.8 g/dL    RDW 12.2 11.8 - 14.4 %    Platelets 683 606 - 423 k/uL    MPV 8.9 8.1 - 13.5 fL    NRBC Automated 0.0 0.0 per 100 WBC    Seg Neutrophils 57 36 - 65 %    Lymphocytes 30 24 - 43 %    Monocytes 9 3 - 12 %    Eosinophils % 3 1 - 4 %    Basophils 1 0 - 2 %    Immature Granulocytes 0 0 %    Segs Absolute 3.78 1.50 - 8.10 k/uL    Absolute Lymph # 2.01 1.10 - 3.70 k/uL    Absolute Mono # 0.61 0.10 - 1.20 k/uL    Absolute Eos # 0.18 0.00 - 0.44 k/uL    Basophils Absolute 0.04 0.00 - 0.20 k/uL    Absolute Immature Granulocyte 0.02 0.00 - 0.30 k/uL   Hemoglobin A1C    Collection Time: 10/20/22  8:33 AM   Result Value Ref Range    Hemoglobin A1C 6.3 (H) 4.0 - 6.0 %    Estimated Avg Glucose 134 mg/dL   TYPE AND SCREEN    Collection Time: 10/20/22  8:33 AM   Result Value Ref Range    Expiration Date 11/13/2022,2359     Arm Band Number BE [id number]     ABO/Rh B POSITIVE     Antibody Screen NEGATIVE    EKG 12 Lead    Collection Time: 10/20/22  9:04 AM   Result Value Ref Range    Ventricular Rate 75 BPM    Atrial Rate 75 BPM    P-R Interval 170 ms    QRS Duration 86 ms    Q-T Interval 408 ms    QTc Calculation (Bazett) 455 ms    P Axis 13 degrees    R Axis 9 degrees    T Axis 16 degrees       Recent Labs     10/20/22  0833   HGB 14.9   HCT 44.0   WBC 6.6   MCV 91.9      K 4.1      CO2 27   BUN 21*   CREATININE 1.33*   GLUCOSE 119*       No results for input(s): COVID19 in the last 720 hours. *Please note that labs listed above are the most recent lab values available in EPIC at the time of the visit and additional labs may have been drawn or resulted since that time. Imaging/Diagnostics:    MRI KNEE RIGHT WO CONTRAST    Result Date: 10/3/2022  EXAMINATION: MRI OF THE RIGHT KNEE WITHOUT CONTRAST, 10/3/2022 7:29 am TECHNIQUE: Multiplanar multisequence MRI of the right knee was performed without the administration of intravenous contrast. COMPARISON: Right knee plain radiographs from 09/10/2022 HISTORY: ORDERING SYSTEM PROVIDED HISTORY: Right knee pain, unspecified chronicity TECHNOLOGIST PROVIDED HISTORY: Reason for Exam: PT STATES PX SURGERY, PAIN AND SWELLING FOR MONTHS, NKI 42-year-old male who complains of right knee pain and swelling for months; no known injury FINDINGS: MENISCI: Complex multidirectional tearing and volume loss throughout the posterior horn and body of the medial meniscus. Volume loss of the medial meniscus could be related to underlying subjacent partial meniscectomy. Outward extrusion of the residual medial meniscus. Degeneration of the lateral meniscus. No discrete lateral meniscus tear. CRUCIATE LIGAMENTS: Moderate ACL ganglion formation. ACL fibers appear continuous/intact. Posterior cruciate ligament appears intact.  EXTENSOR MECHANISM: Distal quadriceps tendon, patellar tendon, and patellar retinacula appear intact. LATERAL COLLATERAL LIGAMENT COMPLEX: Mild tendinosis of the proximal popliteus tendon. Iliotibial band, lateral collateral ligament, and biceps femoris appear intact. MEDIAL COLLATERAL LIGAMENT COMPLEX: Medial collateral ligament complex appears intact. KNEE JOINT: Large joint effusion. Osseous alignment is normal. No acute fracture or dislocation. Mild tricompartmental osteophyte spurring. Diffuse grade 3-4 medial compartment chondromalacia. Grade 2-3 chondromalacia of the inner aspect of the lateral compartment with superimposed partial and full-thickness fissuring. Grade 2-3 patellofemoral chondromalacia with multifocal superimposed partial and full-thickness fissuring. BONE MARROW: Subcortical cystic changes subjacent to the tibial spine. Bone marrow signal intensity within the visualized osseous structures otherwise within normal limits. SOFT TISSUES: Mild circumferential edema in the subcutaneous fat about the knee. Moderate complex Baker's cyst.     1. Volume loss of the medial meniscus which could be related to prior partial meniscectomy. Superimposed complex multidirectional tearing in the residual posterior horn and body of the medial meniscus with outward extrusion. 2. Lateral meniscus degeneration. 3. Moderate ACL ganglion formation. 4. Mild proximal popliteus tendinosis. 5. Large joint effusion. 6. Tricompartmental osteoarthrosis. Moderate to severe medial compartment chondromalacia. Mild-to-moderate chondromalacia of the inner lateral compartment with superimposed partial and full-thickness fissuring. Mild-to-moderate patellofemoral chondromalacia with multifocal superimposed partial and full-thickness fissuring. 7. Moderate complex Baker's cyst.       EKG: 10/20/2022: See Epic. Diagnosis:      1. Primary osteoarthritis of one knee, right [M17.11]. Plans:     1.  RIGHT KNEE TOTAL ARTHROPLASTY - [address] \[facility]\"" Seth, APRN - CNP  10/20/2022  2:09 PM

## 2022-10-21 LAB
MRSA, DNA, NASAL: NEGATIVE
SPECIMEN DESCRIPTION: NORMAL

## 2022-10-25 NOTE — PROGRESS NOTES
[facility] ([facility]) Joint Replacement Pre-surgical Assessment    Scheduled Surgery Date: 11/10/2022 at [facility]  Surgery Time: 0730    Surgeon: Yamilet Moise  Procedure: right Total Knee    Primary Insurance Coverage [employer]  Pre-op class attended YES    PCP: Alex Peacock MD  Clearance received by PCP: Yes    Anticipated Discharge Plan: home  Agency (if applicable): UNSURE    Significant PMH:   Surgical History    Procedure Laterality Date Comment Source   ABLATION OF DYSRHYTHMIC FOCUS  07/2019     CARDIOVERSION  06/07/2019     CARDIOVERSION  07/01/2019 by Dr. Floyd Dela Cruz. 1 shock @ 200 converted to NSR    COLONOSCOPY       INSERTABLE CARDIAC MONITOR  2019 Loop recorder in place (10/20/22)    KNEE ARTHROSCOPY Bilateral      MOHS SURGERY   1st stage head and neck    SEPTOPLASTY Bilateral 11/29/2021 SEPTOPLASTY BILATERAL TURBINATE REDU[place] [facility]       TRANSESOPHAGEAL ECHOCARDIOGRAM  06/07/2019     VEIN SURGERY Left        ED Notes    ED Notes    Medical History    Diagnosis Date Comment Source   Arthritis      Atrial fibrillation ([address]      COVID-19 11/2021 Mild per pt., no treatment    Diabetes mellitus ([address]  Type 2, does not check sugars at home, on Metformin, managed by PCP (10/20/22)    Hyperlipidemia      Hypertension      Neuropathy  Tingling in both feet at night per pt. Skin cancer      Sleep apnea  Wears CPAP           Smoking history: none    Alcohol history: Current alcohol use: SOCIAL    Concerns prior to surgery: PT MET WITH OT AFTER CLASS.     Electronically signed by: Delia Estevez RN on 10/25/2022 at 10:34 AM

## 2022-11-09 ENCOUNTER — ANESTHESIA EVENT (OUTPATIENT)
Dept: OPERATING ROOM | Age: 52
End: 2022-11-09
Payer: COMMERCIAL

## 2022-11-10 ENCOUNTER — ANESTHESIA (OUTPATIENT)
Dept: OPERATING ROOM | Age: 52
End: 2022-11-10
Payer: COMMERCIAL

## 2022-11-10 ENCOUNTER — HOSPITAL ENCOUNTER (OUTPATIENT)
Age: 52
Discharge: HOME | End: 2022-11-11
Attending: ORTHOPAEDIC SURGERY | Admitting: ORTHOPAEDIC SURGERY
Payer: COMMERCIAL

## 2022-11-10 DIAGNOSIS — G89.18 ACUTE POSTOPERATIVE PAIN: Primary | ICD-10-CM

## 2022-11-10 PROBLEM — M17.11 LOCALIZED OSTEOARTHRITIS OF RIGHT KNEE: Status: ACTIVE | Noted: 2022-11-10

## 2022-11-10 LAB
GLUCOSE BLD-MCNC: 138 MG/DL (ref 75–110)
GLUCOSE BLD-MCNC: 147 MG/DL (ref 75–110)
GLUCOSE BLD-MCNC: 152 MG/DL (ref 75–110)
GLUCOSE BLD-MCNC: 171 MG/DL (ref 75–110)

## 2022-11-10 PROCEDURE — 97535 SELF CARE MNGMENT TRAINING: CPT

## 2022-11-10 PROCEDURE — 6360000002 HC RX W HCPCS: Performed by: ORTHOPAEDIC SURGERY

## 2022-11-10 PROCEDURE — 7100000001 HC PACU RECOVERY - ADDTL 15 MIN: Performed by: ORTHOPAEDIC SURGERY

## 2022-11-10 PROCEDURE — 97166 OT EVAL MOD COMPLEX 45 MIN: CPT

## 2022-11-10 PROCEDURE — 2500000003 HC RX 250 WO HCPCS: Performed by: NURSE ANESTHETIST, CERTIFIED REGISTERED

## 2022-11-10 PROCEDURE — 7100000000 HC PACU RECOVERY - FIRST 15 MIN: Performed by: ORTHOPAEDIC SURGERY

## 2022-11-10 PROCEDURE — C1776 JOINT DEVICE (IMPLANTABLE): HCPCS | Performed by: ORTHOPAEDIC SURGERY

## 2022-11-10 PROCEDURE — 2709999900 HC NON-CHARGEABLE SUPPLY: Performed by: ORTHOPAEDIC SURGERY

## 2022-11-10 PROCEDURE — 97110 THERAPEUTIC EXERCISES: CPT

## 2022-11-10 PROCEDURE — 6370000000 HC RX 637 (ALT 250 FOR IP): Performed by: ORTHOPAEDIC SURGERY

## 2022-11-10 PROCEDURE — 82947 ASSAY GLUCOSE BLOOD QUANT: CPT

## 2022-11-10 PROCEDURE — 3700000001 HC ADD 15 MINUTES (ANESTHESIA): Performed by: ORTHOPAEDIC SURGERY

## 2022-11-10 PROCEDURE — 2580000003 HC RX 258: Performed by: ORTHOPAEDIC SURGERY

## 2022-11-10 PROCEDURE — C1713 ANCHOR/SCREW BN/BN,TIS/BN: HCPCS | Performed by: ORTHOPAEDIC SURGERY

## 2022-11-10 PROCEDURE — 2580000003 HC RX 258: Performed by: ANESTHESIOLOGY

## 2022-11-10 PROCEDURE — 3600000005 HC SURGERY LEVEL 5 BASE: Performed by: ORTHOPAEDIC SURGERY

## 2022-11-10 PROCEDURE — 6360000002 HC RX W HCPCS: Performed by: NURSE ANESTHETIST, CERTIFIED REGISTERED

## 2022-11-10 PROCEDURE — C9290 INJ, BUPIVACAINE LIPOSOME: HCPCS | Performed by: ORTHOPAEDIC SURGERY

## 2022-11-10 PROCEDURE — 3600000015 HC SURGERY LEVEL 5 ADDTL 15MIN: Performed by: ORTHOPAEDIC SURGERY

## 2022-11-10 PROCEDURE — 6370000000 HC RX 637 (ALT 250 FOR IP): Performed by: ANESTHESIOLOGY

## 2022-11-10 PROCEDURE — 97530 THERAPEUTIC ACTIVITIES: CPT

## 2022-11-10 PROCEDURE — 3700000000 HC ANESTHESIA ATTENDED CARE: Performed by: ORTHOPAEDIC SURGERY

## 2022-11-10 PROCEDURE — 97162 PT EVAL MOD COMPLEX 30 MIN: CPT

## 2022-11-10 PROCEDURE — 97116 GAIT TRAINING THERAPY: CPT

## 2022-11-10 PROCEDURE — A4217 STERILE WATER/SALINE, 500 ML: HCPCS | Performed by: ORTHOPAEDIC SURGERY

## 2022-11-10 DEVICE — CEMENT BNE 40GM HI VISC RADPQ FOR REV SURG: Type: IMPLANTABLE DEVICE | Site: KNEE | Status: FUNCTIONAL

## 2022-11-10 DEVICE — IMPLANTABLE DEVICE: Type: IMPLANTABLE DEVICE | Site: KNEE | Status: FUNCTIONAL

## 2022-11-10 DEVICE — PSN TIB STM 5 DEG SZ G R: Type: IMPLANTABLE DEVICE | Site: KNEE | Status: FUNCTIONAL

## 2022-11-10 DEVICE — PSN MC VE ASF R 10MM 12/GH: Type: IMPLANTABLE DEVICE | Site: KNEE | Status: FUNCTIONAL

## 2022-11-10 DEVICE — UPCHARGE KNEE VITAMIN E LINER ZIMMER BIOMET: Type: IMPLANTABLE DEVICE | Site: KNEE | Status: FUNCTIONAL

## 2022-11-10 DEVICE — DUP USE 333543 IMPL KNEE PSN ALL POLY PAT PLY 35MM: Type: IMPLANTABLE DEVICE | Site: KNEE | Status: FUNCTIONAL

## 2022-11-10 RX ORDER — OXYCODONE HYDROCHLORIDE 5 MG/1
5 TABLET ORAL EVERY 4 HOURS PRN
Status: DISCONTINUED | OUTPATIENT
Start: 2022-11-10 | End: 2022-11-11 | Stop reason: HOSPADM

## 2022-11-10 RX ORDER — POTASSIUM CHLORIDE 20 MEQ/1
10 TABLET, EXTENDED RELEASE ORAL DAILY
Status: DISCONTINUED | OUTPATIENT
Start: 2022-11-10 | End: 2022-11-11 | Stop reason: HOSPADM

## 2022-11-10 RX ORDER — ONDANSETRON 2 MG/ML
4 INJECTION INTRAMUSCULAR; INTRAVENOUS
Status: DISCONTINUED | OUTPATIENT
Start: 2022-11-10 | End: 2022-11-10 | Stop reason: HOSPADM

## 2022-11-10 RX ORDER — ACETAMINOPHEN 325 MG/1
650 TABLET ORAL EVERY 6 HOURS
Status: DISCONTINUED | OUTPATIENT
Start: 2022-11-10 | End: 2022-11-11 | Stop reason: HOSPADM

## 2022-11-10 RX ORDER — HYDROCHLOROTHIAZIDE 25 MG/1
12.5 TABLET ORAL DAILY
Status: DISCONTINUED | OUTPATIENT
Start: 2022-11-10 | End: 2022-11-11 | Stop reason: HOSPADM

## 2022-11-10 RX ORDER — VANCOMYCIN HYDROCHLORIDE 1 G/20ML
INJECTION, POWDER, LYOPHILIZED, FOR SOLUTION INTRAVENOUS
Status: DISPENSED
Start: 2022-11-10 | End: 2022-11-10

## 2022-11-10 RX ORDER — SODIUM CHLORIDE 0.9 % (FLUSH) 0.9 %
5-40 SYRINGE (ML) INJECTION EVERY 12 HOURS SCHEDULED
Status: DISCONTINUED | OUTPATIENT
Start: 2022-11-10 | End: 2022-11-11 | Stop reason: HOSPADM

## 2022-11-10 RX ORDER — SODIUM CHLORIDE 0.9 % (FLUSH) 0.9 %
5-40 SYRINGE (ML) INJECTION EVERY 12 HOURS SCHEDULED
Status: DISCONTINUED | OUTPATIENT
Start: 2022-11-10 | End: 2022-11-10 | Stop reason: HOSPADM

## 2022-11-10 RX ORDER — ONDANSETRON 4 MG/1
4 TABLET, ORALLY DISINTEGRATING ORAL EVERY 8 HOURS PRN
Status: DISCONTINUED | OUTPATIENT
Start: 2022-11-10 | End: 2022-11-11 | Stop reason: HOSPADM

## 2022-11-10 RX ORDER — SODIUM CHLORIDE 0.9 % (FLUSH) 0.9 %
5-40 SYRINGE (ML) INJECTION PRN
Status: DISCONTINUED | OUTPATIENT
Start: 2022-11-10 | End: 2022-11-10 | Stop reason: HOSPADM

## 2022-11-10 RX ORDER — PHENYLEPHRINE HCL IN 0.9% NACL 1 MG/10 ML
VIAL (ML) INTRAVENOUS PRN
Status: DISCONTINUED | OUTPATIENT
Start: 2022-11-10 | End: 2022-11-10 | Stop reason: SDUPTHER

## 2022-11-10 RX ORDER — DEXAMETHASONE SODIUM PHOSPHATE 10 MG/ML
INJECTION, SOLUTION INTRAMUSCULAR; INTRAVENOUS PRN
Status: DISCONTINUED | OUTPATIENT
Start: 2022-11-10 | End: 2022-11-10 | Stop reason: SDUPTHER

## 2022-11-10 RX ORDER — GLYCOPYRROLATE 0.2 MG/ML
INJECTION INTRAMUSCULAR; INTRAVENOUS PRN
Status: DISCONTINUED | OUTPATIENT
Start: 2022-11-10 | End: 2022-11-10 | Stop reason: SDUPTHER

## 2022-11-10 RX ORDER — LIDOCAINE HYDROCHLORIDE 10 MG/ML
1 INJECTION, SOLUTION EPIDURAL; INFILTRATION; INTRACAUDAL; PERINEURAL
Status: DISCONTINUED | OUTPATIENT
Start: 2022-11-11 | End: 2022-11-10 | Stop reason: HOSPADM

## 2022-11-10 RX ORDER — METFORMIN HYDROCHLORIDE 500 MG/1
500 TABLET, EXTENDED RELEASE ORAL
Status: DISCONTINUED | OUTPATIENT
Start: 2022-11-11 | End: 2022-11-11 | Stop reason: HOSPADM

## 2022-11-10 RX ORDER — LABETALOL HYDROCHLORIDE 5 MG/ML
10 INJECTION, SOLUTION INTRAVENOUS
Status: DISCONTINUED | OUTPATIENT
Start: 2022-11-10 | End: 2022-11-10 | Stop reason: HOSPADM

## 2022-11-10 RX ORDER — VANCOMYCIN HYDROCHLORIDE 1 G/20ML
INJECTION, POWDER, LYOPHILIZED, FOR SOLUTION INTRAVENOUS PRN
Status: DISCONTINUED | OUTPATIENT
Start: 2022-11-10 | End: 2022-11-10 | Stop reason: ALTCHOICE

## 2022-11-10 RX ORDER — SODIUM CHLORIDE 0.9 % (FLUSH) 0.9 %
5-40 SYRINGE (ML) INJECTION PRN
Status: DISCONTINUED | OUTPATIENT
Start: 2022-11-10 | End: 2022-11-11 | Stop reason: HOSPADM

## 2022-11-10 RX ORDER — ONDANSETRON 2 MG/ML
4 INJECTION INTRAMUSCULAR; INTRAVENOUS EVERY 6 HOURS PRN
Status: DISCONTINUED | OUTPATIENT
Start: 2022-11-10 | End: 2022-11-11 | Stop reason: HOSPADM

## 2022-11-10 RX ORDER — KETOROLAC TROMETHAMINE 30 MG/ML
30 INJECTION, SOLUTION INTRAMUSCULAR; INTRAVENOUS EVERY 6 HOURS
Status: DISCONTINUED | OUTPATIENT
Start: 2022-11-10 | End: 2022-11-11 | Stop reason: HOSPADM

## 2022-11-10 RX ORDER — DOCUSATE SODIUM 100 MG/1
100 CAPSULE, LIQUID FILLED ORAL 2 TIMES DAILY
Qty: 30 CAPSULE | Refills: 0
Start: 2022-11-10

## 2022-11-10 RX ORDER — FENTANYL CITRATE 50 UG/ML
INJECTION, SOLUTION INTRAMUSCULAR; INTRAVENOUS PRN
Status: DISCONTINUED | OUTPATIENT
Start: 2022-11-10 | End: 2022-11-10 | Stop reason: SDUPTHER

## 2022-11-10 RX ORDER — ONDANSETRON 2 MG/ML
INJECTION INTRAMUSCULAR; INTRAVENOUS PRN
Status: DISCONTINUED | OUTPATIENT
Start: 2022-11-10 | End: 2022-11-10 | Stop reason: SDUPTHER

## 2022-11-10 RX ORDER — IBUPROFEN 200 MG
200 TABLET ORAL EVERY 6 HOURS PRN
Status: ON HOLD | COMMUNITY
End: 2022-11-10 | Stop reason: HOSPADM

## 2022-11-10 RX ORDER — ASPIRIN 325 MG
325 TABLET, DELAYED RELEASE (ENTERIC COATED) ORAL 2 TIMES DAILY
Qty: 28 TABLET | Refills: 0 | Status: SHIPPED | OUTPATIENT
Start: 2022-11-10 | End: 2022-11-24

## 2022-11-10 RX ORDER — LABETALOL 200 MG/1
200 TABLET, FILM COATED ORAL 2 TIMES DAILY
Status: DISCONTINUED | OUTPATIENT
Start: 2022-11-10 | End: 2022-11-11 | Stop reason: HOSPADM

## 2022-11-10 RX ORDER — TRANEXAMIC ACID 100 MG/ML
INJECTION, SOLUTION INTRAVENOUS PRN
Status: DISCONTINUED | OUTPATIENT
Start: 2022-11-10 | End: 2022-11-10 | Stop reason: SDUPTHER

## 2022-11-10 RX ORDER — MIDAZOLAM HYDROCHLORIDE 1 MG/ML
INJECTION INTRAMUSCULAR; INTRAVENOUS PRN
Status: DISCONTINUED | OUTPATIENT
Start: 2022-11-10 | End: 2022-11-10 | Stop reason: SDUPTHER

## 2022-11-10 RX ORDER — CELECOXIB 200 MG/1
200 CAPSULE ORAL ONCE
Status: COMPLETED | OUTPATIENT
Start: 2022-11-10 | End: 2022-11-10

## 2022-11-10 RX ORDER — OXYCODONE HYDROCHLORIDE 5 MG/1
10 TABLET ORAL EVERY 4 HOURS PRN
Status: DISCONTINUED | OUTPATIENT
Start: 2022-11-10 | End: 2022-11-11 | Stop reason: HOSPADM

## 2022-11-10 RX ORDER — GABAPENTIN 300 MG/1
300 CAPSULE ORAL ONCE
Status: COMPLETED | OUTPATIENT
Start: 2022-11-10 | End: 2022-11-10

## 2022-11-10 RX ORDER — PROCHLORPERAZINE EDISYLATE 5 MG/ML
5 INJECTION INTRAMUSCULAR; INTRAVENOUS
Status: DISCONTINUED | OUTPATIENT
Start: 2022-11-10 | End: 2022-11-10 | Stop reason: HOSPADM

## 2022-11-10 RX ORDER — FENTANYL CITRATE 50 UG/ML
25 INJECTION, SOLUTION INTRAMUSCULAR; INTRAVENOUS EVERY 5 MIN PRN
Status: DISCONTINUED | OUTPATIENT
Start: 2022-11-10 | End: 2022-11-10 | Stop reason: HOSPADM

## 2022-11-10 RX ORDER — HYDROXYZINE HYDROCHLORIDE 10 MG/1
10 TABLET, FILM COATED ORAL EVERY 8 HOURS PRN
Status: DISCONTINUED | OUTPATIENT
Start: 2022-11-10 | End: 2022-11-11 | Stop reason: HOSPADM

## 2022-11-10 RX ORDER — SODIUM CHLORIDE 9 MG/ML
INJECTION, SOLUTION INTRAVENOUS PRN
Status: DISCONTINUED | OUTPATIENT
Start: 2022-11-10 | End: 2022-11-11 | Stop reason: HOSPADM

## 2022-11-10 RX ORDER — SODIUM CHLORIDE 9 MG/ML
INJECTION, SOLUTION INTRAVENOUS PRN
Status: DISCONTINUED | OUTPATIENT
Start: 2022-11-10 | End: 2022-11-10 | Stop reason: HOSPADM

## 2022-11-10 RX ORDER — LIDOCAINE HYDROCHLORIDE 20 MG/ML
INJECTION, SOLUTION EPIDURAL; INFILTRATION; INTRACAUDAL; PERINEURAL PRN
Status: DISCONTINUED | OUTPATIENT
Start: 2022-11-10 | End: 2022-11-10 | Stop reason: SDUPTHER

## 2022-11-10 RX ORDER — OXYCODONE HYDROCHLORIDE 5 MG/1
5 TABLET ORAL
Status: DISCONTINUED | OUTPATIENT
Start: 2022-11-10 | End: 2022-11-10 | Stop reason: HOSPADM

## 2022-11-10 RX ORDER — ONDANSETRON 4 MG/1
4 TABLET, FILM COATED ORAL EVERY 6 HOURS PRN
Qty: 30 TABLET | Refills: 1
Start: 2022-11-10

## 2022-11-10 RX ORDER — TRANEXAMIC ACID 100 MG/ML
INJECTION, SOLUTION INTRAVENOUS
Status: COMPLETED
Start: 2022-11-10 | End: 2022-11-10

## 2022-11-10 RX ORDER — OXYCODONE HYDROCHLORIDE AND ACETAMINOPHEN 5; 325 MG/1; MG/1
1-2 TABLET ORAL EVERY 4 HOURS PRN
Qty: 50 TABLET | Refills: 0
Start: 2022-11-10 | End: 2022-11-17

## 2022-11-10 RX ORDER — HYDROMORPHONE HYDROCHLORIDE 1 MG/ML
0.5 INJECTION, SOLUTION INTRAMUSCULAR; INTRAVENOUS; SUBCUTANEOUS EVERY 5 MIN PRN
Status: DISCONTINUED | OUTPATIENT
Start: 2022-11-10 | End: 2022-11-10 | Stop reason: HOSPADM

## 2022-11-10 RX ORDER — HYDROMORPHONE HCL 110MG/55ML
PATIENT CONTROLLED ANALGESIA SYRINGE INTRAVENOUS PRN
Status: DISCONTINUED | OUTPATIENT
Start: 2022-11-10 | End: 2022-11-10 | Stop reason: SDUPTHER

## 2022-11-10 RX ORDER — SODIUM CHLORIDE, SODIUM LACTATE, POTASSIUM CHLORIDE, CALCIUM CHLORIDE 600; 310; 30; 20 MG/100ML; MG/100ML; MG/100ML; MG/100ML
INJECTION, SOLUTION INTRAVENOUS CONTINUOUS
Status: DISCONTINUED | OUTPATIENT
Start: 2022-11-11 | End: 2022-11-10

## 2022-11-10 RX ORDER — PROPOFOL 10 MG/ML
INJECTION, EMULSION INTRAVENOUS PRN
Status: DISCONTINUED | OUTPATIENT
Start: 2022-11-10 | End: 2022-11-10 | Stop reason: SDUPTHER

## 2022-11-10 RX ORDER — SODIUM CHLORIDE 9 MG/ML
INJECTION, SOLUTION INTRAVENOUS CONTINUOUS
Status: DISCONTINUED | OUTPATIENT
Start: 2022-11-11 | End: 2022-11-10

## 2022-11-10 RX ORDER — ROCURONIUM BROMIDE 10 MG/ML
INJECTION, SOLUTION INTRAVENOUS PRN
Status: DISCONTINUED | OUTPATIENT
Start: 2022-11-10 | End: 2022-11-10 | Stop reason: SDUPTHER

## 2022-11-10 RX ORDER — SODIUM CHLORIDE 9 MG/ML
INJECTION, SOLUTION INTRAVENOUS CONTINUOUS
Status: DISCONTINUED | OUTPATIENT
Start: 2022-11-10 | End: 2022-11-11 | Stop reason: HOSPADM

## 2022-11-10 RX ORDER — ACETAMINOPHEN 500 MG
1000 TABLET ORAL ONCE
Status: COMPLETED | OUTPATIENT
Start: 2022-11-10 | End: 2022-11-10

## 2022-11-10 RX ORDER — AMLODIPINE BESYLATE 10 MG/1
10 TABLET ORAL DAILY
Status: DISCONTINUED | OUTPATIENT
Start: 2022-11-11 | End: 2022-11-11 | Stop reason: HOSPADM

## 2022-11-10 RX ORDER — SENNA PLUS 8.6 MG/1
1 TABLET ORAL DAILY PRN
Status: DISCONTINUED | OUTPATIENT
Start: 2022-11-10 | End: 2022-11-11 | Stop reason: HOSPADM

## 2022-11-10 RX ORDER — ROSUVASTATIN CALCIUM 10 MG/1
10 TABLET, COATED ORAL DAILY
Status: DISCONTINUED | OUTPATIENT
Start: 2022-11-10 | End: 2022-11-11 | Stop reason: HOSPADM

## 2022-11-10 RX ADMIN — Medication 200 MCG: at 09:29

## 2022-11-10 RX ADMIN — GABAPENTIN 300 MG: 300 CAPSULE ORAL at 07:08

## 2022-11-10 RX ADMIN — Medication 100 MCG: at 09:25

## 2022-11-10 RX ADMIN — OXYCODONE 10 MG: 5 TABLET ORAL at 16:02

## 2022-11-10 RX ADMIN — Medication 200 MCG: at 09:43

## 2022-11-10 RX ADMIN — OXYCODONE 10 MG: 5 TABLET ORAL at 21:25

## 2022-11-10 RX ADMIN — TRANEXAMIC ACID 1000 MG: 100 INJECTION, SOLUTION INTRAVENOUS at 09:12

## 2022-11-10 RX ADMIN — LABETALOL HYDROCHLORIDE 200 MG: 200 TABLET, FILM COATED ORAL at 21:48

## 2022-11-10 RX ADMIN — PROPOFOL 200 MG: 10 INJECTION, EMULSION INTRAVENOUS at 07:32

## 2022-11-10 RX ADMIN — Medication 100 MCG: at 09:23

## 2022-11-10 RX ADMIN — TRANEXAMIC ACID 1000 MG: 100 INJECTION, SOLUTION INTRAVENOUS at 07:59

## 2022-11-10 RX ADMIN — Medication 200 MCG: at 09:32

## 2022-11-10 RX ADMIN — ONDANSETRON 4 MG: 2 INJECTION INTRAMUSCULAR; INTRAVENOUS at 09:04

## 2022-11-10 RX ADMIN — ASPIRIN 325 MG: 325 TABLET, COATED ORAL at 14:23

## 2022-11-10 RX ADMIN — SODIUM CHLORIDE: 9 INJECTION, SOLUTION INTRAVENOUS at 12:10

## 2022-11-10 RX ADMIN — Medication 200 MCG: at 10:01

## 2022-11-10 RX ADMIN — ROSUVASTATIN CALCIUM 10 MG: 10 TABLET, FILM COATED ORAL at 14:17

## 2022-11-10 RX ADMIN — ACETAMINOPHEN 650 MG: 325 TABLET, FILM COATED ORAL at 14:17

## 2022-11-10 RX ADMIN — KETOROLAC TROMETHAMINE 30 MG: 30 INJECTION, SOLUTION INTRAMUSCULAR; INTRAVENOUS at 12:11

## 2022-11-10 RX ADMIN — Medication 150 MCG: at 09:27

## 2022-11-10 RX ADMIN — HYDROMORPHONE HYDROCHLORIDE 0.4 MG: 2 INJECTION INTRAMUSCULAR; INTRAVENOUS; SUBCUTANEOUS at 08:44

## 2022-11-10 RX ADMIN — ASPIRIN 325 MG: 325 TABLET, COATED ORAL at 21:25

## 2022-11-10 RX ADMIN — MIDAZOLAM 2 MG: 1 INJECTION INTRAMUSCULAR; INTRAVENOUS at 07:27

## 2022-11-10 RX ADMIN — CEFAZOLIN 3000 MG: 10 INJECTION, POWDER, FOR SOLUTION INTRAVENOUS at 23:36

## 2022-11-10 RX ADMIN — DEXAMETHASONE SODIUM PHOSPHATE 10 MG: 10 INJECTION, SOLUTION INTRAMUSCULAR; INTRAVENOUS at 07:48

## 2022-11-10 RX ADMIN — ACETAMINOPHEN 650 MG: 325 TABLET, FILM COATED ORAL at 18:15

## 2022-11-10 RX ADMIN — SODIUM CHLORIDE: 9 INJECTION, SOLUTION INTRAVENOUS at 23:27

## 2022-11-10 RX ADMIN — HYDROMORPHONE HYDROCHLORIDE 0.4 MG: 2 INJECTION INTRAMUSCULAR; INTRAVENOUS; SUBCUTANEOUS at 08:15

## 2022-11-10 RX ADMIN — KETOROLAC TROMETHAMINE 30 MG: 30 INJECTION, SOLUTION INTRAMUSCULAR; INTRAVENOUS at 18:15

## 2022-11-10 RX ADMIN — GLYCOPYRROLATE 0.2 MG: 0.2 INJECTION INTRAMUSCULAR; INTRAVENOUS at 09:21

## 2022-11-10 RX ADMIN — CELECOXIB 200 MG: 200 CAPSULE ORAL at 07:08

## 2022-11-10 RX ADMIN — SUGAMMADEX 200 MG: 100 INJECTION, SOLUTION INTRAVENOUS at 09:30

## 2022-11-10 RX ADMIN — KETOROLAC TROMETHAMINE 30 MG: 30 INJECTION, SOLUTION INTRAMUSCULAR; INTRAVENOUS at 23:32

## 2022-11-10 RX ADMIN — ROCURONIUM BROMIDE 50 MG: 10 INJECTION, SOLUTION INTRAVENOUS at 07:40

## 2022-11-10 RX ADMIN — CEFAZOLIN 3000 MG: 10 INJECTION, POWDER, FOR SOLUTION INTRAVENOUS at 07:50

## 2022-11-10 RX ADMIN — SODIUM CHLORIDE, POTASSIUM CHLORIDE, SODIUM LACTATE AND CALCIUM CHLORIDE: 600; 310; 30; 20 INJECTION, SOLUTION INTRAVENOUS at 07:11

## 2022-11-10 RX ADMIN — POTASSIUM CHLORIDE 10 MEQ: 1500 TABLET, EXTENDED RELEASE ORAL at 14:18

## 2022-11-10 RX ADMIN — ACETAMINOPHEN 650 MG: 325 TABLET, FILM COATED ORAL at 23:32

## 2022-11-10 RX ADMIN — BISACODYL 5 MG: 5 TABLET, COATED ORAL at 14:18

## 2022-11-10 RX ADMIN — Medication 100 MCG: at 07:40

## 2022-11-10 RX ADMIN — HYDROMORPHONE HYDROCHLORIDE 0.4 MG: 2 INJECTION INTRAMUSCULAR; INTRAVENOUS; SUBCUTANEOUS at 08:21

## 2022-11-10 RX ADMIN — CEFAZOLIN 3000 MG: 10 INJECTION, POWDER, FOR SOLUTION INTRAVENOUS at 16:05

## 2022-11-10 RX ADMIN — ACETAMINOPHEN 1000 MG: 500 TABLET ORAL at 07:08

## 2022-11-10 RX ADMIN — SODIUM CHLORIDE, POTASSIUM CHLORIDE, SODIUM LACTATE AND CALCIUM CHLORIDE: 600; 310; 30; 20 INJECTION, SOLUTION INTRAVENOUS at 09:08

## 2022-11-10 RX ADMIN — HYDROCHLOROTHIAZIDE 12.5 MG: 25 TABLET ORAL at 14:23

## 2022-11-10 RX ADMIN — HYDROMORPHONE HYDROCHLORIDE 0.4 MG: 2 INJECTION INTRAMUSCULAR; INTRAVENOUS; SUBCUTANEOUS at 08:18

## 2022-11-10 RX ADMIN — LIDOCAINE HYDROCHLORIDE 100 MG: 20 INJECTION, SOLUTION EPIDURAL; INFILTRATION; INTRACAUDAL; PERINEURAL at 07:40

## 2022-11-10 ASSESSMENT — PAIN DESCRIPTION - PAIN TYPE
TYPE: SURGICAL PAIN

## 2022-11-10 ASSESSMENT — PAIN DESCRIPTION - ONSET
ONSET: ON-GOING

## 2022-11-10 ASSESSMENT — PAIN DESCRIPTION - LOCATION
LOCATION: KNEE

## 2022-11-10 ASSESSMENT — PAIN - FUNCTIONAL ASSESSMENT
PAIN_FUNCTIONAL_ASSESSMENT: PREVENTS OR INTERFERES SOME ACTIVE ACTIVITIES AND ADLS
PAIN_FUNCTIONAL_ASSESSMENT: 0-10
PAIN_FUNCTIONAL_ASSESSMENT: PREVENTS OR INTERFERES SOME ACTIVE ACTIVITIES AND ADLS

## 2022-11-10 ASSESSMENT — PAIN DESCRIPTION - ORIENTATION
ORIENTATION: RIGHT
ORIENTATION: LEFT
ORIENTATION: RIGHT

## 2022-11-10 ASSESSMENT — PAIN SCALES - GENERAL
PAINLEVEL_OUTOF10: 4
PAINLEVEL_OUTOF10: 3
PAINLEVEL_OUTOF10: 3
PAINLEVEL_OUTOF10: 4
PAINLEVEL_OUTOF10: 7
PAINLEVEL_OUTOF10: 5
PAINLEVEL_OUTOF10: 5
PAINLEVEL_OUTOF10: 4

## 2022-11-10 ASSESSMENT — PAIN DESCRIPTION - FREQUENCY
FREQUENCY: CONTINUOUS

## 2022-11-10 ASSESSMENT — PAIN DESCRIPTION - DESCRIPTORS
DESCRIPTORS: ACHING;DULL;DISCOMFORT
DESCRIPTORS: ACHING;DULL;DISCOMFORT
DESCRIPTORS: SHARP
DESCRIPTORS: ACHING;DULL;DISCOMFORT
DESCRIPTORS: SHARP
DESCRIPTORS: DULL

## 2022-11-10 NOTE — OP NOTE
Operative Note      Patient: Soo Paredes  YOB: 1970  MRN: [id number]    Date of Procedure: 11/10/2022    Pre-Op Diagnosis: Primary osteoarthritis of one knee, right [M17.11]    Post-Op Diagnosis: Same       Procedure(s):  RIGHT KNEE TOTAL ARTHROPLASTY - [employer]ET    Surgeon(s):  Oneyda Duran MD    Assistant:   Surgical Assistant: Susanne Bell  Resident: Alvarez Henriquez DO    Anesthesia: General    Estimated Blood Loss (mL): 536     Complications: None    Specimens:   * No specimens in log *    Implants:  Implant Name Type Inv. Item Serial No.  Lot No. LRB No. Used Action   CEMENT BNE 40GM HI VISC RADPQ FOR REV SURG - [id number]  CEMENT BNE 40GM HI VISC RADPQ FOR REV SURG  [employer]- [id number] Right 2 Implanted   DUP USE [id number] IMPL KNEE PSN ALL POLY PAT PLY 35MM - [id number] Knee DUP USE [id number] IMPL KNEE PSN ALL POLY PAT PLY 35MM  [employer] [id number] Right 1 Implanted   IMPL KNEE PERSONA FEM COMP SZ12 RT - [id number] Knee IMPL KNEE PERSONA FEM COMP SZ12 RT  [employer] [id number] Right 1 Implanted   PSN TIB STM 5 DEG SZ G R - [id number]  PSN TIB STM 5 DEG SZ G R  [employer]- [id number] Right 1 Implanted   PSN MC VE ASF R 10MM 12/GH - [id number]  PSN MC VE ASF R 10MM 12/GH  [employer]- [id number] Right 1 Implanted         Drains: * No LDAs found *    Findings: Severe degenerative joint disease    Detailed Description of Procedure:         Detailed Description of Procedure: This is a 57-year-old male with a longstanding history of right knee pain. They have failed attempts at conservative management and have elected to undergo a total knee arthroplasty for treatment of the problems. After informed consent was obtained the patient was brought to the operating room. Once on the operating room table a spinal anesthetic was placed. The patient was positioned on the table for a right total knee arthroplasty.   A stockinette and tourniquet were placed around the proximal thigh. The opposite extremity had an EPC cuff applied and was secured to the table. The operative leg was then cleaned with a chlorhexidine soap and dried. The leg was then prepared with a ChloraPrep solution after 3 minutes of drying time was draped in a sterile fashion. The exposed skin was covered with an Ioban drape. A timeout was performed. After timeout was performed a midline incision was created and dissection was carried down to the level of the extensor mechanism. A medial parapatellar arthrotomy was performed. A medial soft tissue release was performed. The patella fat pad was excised in its entirety. A osteophytes removed from the undersurface of the patella. The knee was then flexed and any osteophytes were removed from around the femoral notch. The ACL and PCL were removed in their entirety. A drill was used to gain entry into the femoral canal.  A 5 degree valgus cutting guide was placed on the anterior distal femur. Our distal femoral osteotomy was performed. The knee was then subluxed anteriorly and our extra medullary tibial guide was secured to the anterior tibia. The guide was placed parallel to the tibial shaft on both the AP and lateral planes. Once in the appropriate position the guide was pinned to the tibia. Our proximal tibial osteotomy cut was performed. Our extension gap was then checked with a 10 gap . Full extension was achieved and the knee was stable with varus and valgus stresses. Our mechanical axis was checked and was found to be aligned from the center of the hip to the center of the knee to the center of the ankle. The gap  was then removed and our flexion gap was checked to make sure that it too was a size 10. Our epicondylar axis was then drawn on the distal femur. Our femoral sizing guide was placed on the femur and the femur measured to be a size 12.   The drill holes for the 4-in-1 cut block were then drilled to be parallel to our epicondylar access in 3 degrees of external rotation. Our 4-in-1 cuts were then performed. A lamina  was then inserted into the flexion gap. Any remnants of our medial lateral menisci were removed in their entirety. A curved osteotome was used to remove osteophytes off of the medial lateral posterior femur. Our Exparel and total joint solution was then used to inject the soft tissues about the entire knee. A double-pronged posterior retractor was then placed and the tibia was subluxed anteriorly. The middle one third of our tibial tubercle was identified and marked. Our tibial sizing guide was placed and aligned with the franck made from our tibial tubercle. Once sized and in the appropriate alignment it was pinned into place. We then turned our attention to the femur. Our trial femoral component was impacted into place. A size 10 trial was then positioned into the knee . The knee now felt balanced in flexion and extension and varus and valgus. We then inverted the patella and her patella thickness measured to be 25. Our patella osteotomy was performed. The patella measured to be a size 35. Our drill holes for the patella were then created and our trial patella was placed. We confirmed that the thickness of our patella was restored. The knee was now brought through full range of motion and patellar tracking was assessed. The patella appeared to track very nicely. An Esmarch was then used to exsanguinate the extremity. The tourniquet was elevated to 250. With the tourniquet up our trial femur and polyethylene components were removed. We then created the keel for our tibial component. After that was done all of our osteotomy aid bone ends were thoroughly irrigated and dried. 2 batches of cement with 1 g of vancomycin were prepared on the back table. Our final components were cemented into place and any excess cement was removed. A size 10 trial polyethylene component was inserted into the knee and the knee was held in full extension as the cement hardened. A patella clamp was placed on the patella as the patella cement hardened. After the cement set up, the knee was brought through one last full range of motion. Betadine solution was used to fully irrigate the knee at this point. The knee felt stable with varus and valgus in flexion and extension with a size 10 polyethylene trial.  Our final polyethylene was then inserted and clicked into place. We confirmed there was no soft tissue caught underneath the polyethylene. The knee was thoroughly irrigated with jet lavage. Our final Exparel and total joint solution was injected into the skin and soft tissues. The extensor mechanism was closed with a #2 Vicryl and a #1 Quill suture. The soft tissues were closed with an 0 Vicryl and a running 2 oh VueLock suture. The skin was approximated with a 3-0 Monocryl suture. Skin glue was placed over the incision. After the glue was hardened sterile dressings were placed on the knee and an Ace bandage was placed from foot to thigh. The patient was transported to recovery in stable condition.   Final closure of the skin was performed by a first assist.     Electronically signed by Kuldeep Burrell MD on 11/10/2022 at 9:47 AM

## 2022-11-10 NOTE — PROGRESS NOTES
Pt admitted to room 2016 per bed in stable condition from PACU  Oriented to room and surroundings  Bed in lowest position, wheels locked, 2/4 side rails up  Call light in reach, room free of clutter, adequate lighting provided  Denies any further questions at this time  Instructed to call out with any questions/concerns/new onset of pain and/or n/v   White board updated  Continue to monitor with hourly rounding  STAY WITH ME protocol initiated   Bed alarm on/Fall Risk signs in place/Fall risk sticker to wrist band  Non-skid socks on/at bedside

## 2022-11-10 NOTE — PROGRESS NOTES
Physical Therapy  Facility/Department: [facility] MED SURG  Physical Therapy Initial Assessment & 2 Follow Up Sessions - Day of Surgery     Name: Ksenia Appiah  : 1970  MRN: [id number]  Date of Service: 11/10/2022  LARS Ocampo reports patient is medically stable for therapy treatment this date. Chart reviewed prior to treatment and patient is agreeable for therapy. All lines intact and patient positioned comfortably at end of treatment. All patient needs addressed prior to ending therapy session. Discharge Recommendations:  Pt presenting with new musculoskeletal dysfunction and would benefit from additional therapy at time of discharge. Please refer to the AM-PAC score for current functional status. Patient would benefit from continued therapy after discharge     PT Equipment Recommendations  Equipment Needed: No (Pt owns RW)    H&P / Procedure: R TKA 11/10/2022, Dr. Amie Degroot       Patient Diagnosis(es): The encounter diagnosis was Acute postoperative pain. Past Medical History:  has a past medical history of Arthritis, Atrial fibrillation ([address] COVID-19, Diabetes mellitus ([facility] [address] Hyperlipidemia, Hypertension, Neuropathy, Skin cancer, and Sleep apnea. Past Surgical History:  has a past surgical history that includes transesophageal echocardiogram (2019); Cardioversion (2019); Mohs surgery; Cardioversion (2019); ablation of dysrhythmic focus (2019); Colonoscopy; skin biopsy; Vein Surgery (Left); Knee arthroscopy (Bilateral); Septoplasty (Bilateral, 2021); Insertable Cardiac Monitor (); and Total knee arthroplasty (Right, 11/10/2022). Assessment   Body Structures, Functions, Activity Limitations Requiring Skilled Therapeutic Intervention: Decreased functional mobility ; Decreased ADL status; Decreased ROM; Decreased balance;Decreased sensation;Decreased endurance;Decreased safe awareness;Decreased strength;Decreased high-level IADLs; Increased pain  Assessment: Pt tolerated PT eval fair s/p R TKA. Activity limited this session d/t decreased sensation and decreased quad control w/ episodes of R knee buckling throughout attempts at pre-gait and ambulation at bedside. Pt currently presenting w/ expected ROM, strength, balance, and mobility deficits post-op. Pt currently functioning below baseline and would benefit from continued skilled PT to address deficits in order to maximize independence w/ functional mobility and return to PLOF as able. Therapy Prognosis: Excellent  Decision Making: Medium Complexity  Requires PT Follow-Up: Yes  Activity Tolerance  Activity Tolerance: Patient limited by endurance;Treatment limited secondary to medical complications  Activity Tolerance Comments: Further activity limited 2/2 decreased quad control & decreased sensation. PT will return for second session to progress activity as able prior to discharge.      Plan   Physcial Therapy Plan  General Plan: 2 times a day 7 days a week (ORTHO Karen Gonzalez)  Current Treatment Recommendations: Strengthening, ROM, Balance training, Functional mobility training, Transfer training, Neuromuscular re-education, Stair training, Gait training, Endurance training, Pain management, Home exercise program, Safety education & training, Patient/Caregiver education & training, Equipment evaluation, education, & procurement, Therapeutic activities  Safety Devices  Type of Devices: Bed alarm in place, Call light within reach, Left in bed, Gait belt, Nurse notified  Restraints  Restraints Initially in Place: No     Restrictions  Restrictions/Precautions  Restrictions/Precautions: General Precautions, Fall Risk, Weight Bearing  Required Braces or Orthoses?: No  Lower Extremity Weight Bearing Restrictions  Right Lower Extremity Weight Bearing: Weight Bearing As Tolerated  Position Activity Restriction  Other position/activity restrictions: B thigh MARY hose, LUE IV, 2L O2 NC, continuous pulse ox     Subjective General  Patient assessed for rehabilitation services?: Yes  Response To Previous Treatment: Not applicable  Family / Caregiver Present: Yes (Pt's spouse present at bedside throughout session.)  Follows Commands: Within Functional Limits  General Comment  Comments: RN and pt agreeable to therapy. Pt supine in bed upon arrival.  Pt cooperative throughout. Subjective  Subjective: Pt reporting \"minimal\" pain at beginning of PT eval which increased w/ activity throughout session.          Social/Functional History  Social/Functional History  Lives With: Spouse, Daughter (13 year old)  Type of Home: House  Home Layout: Two level, 1/2 bath on main level, Bed/Bath upstairs (full flight of stairs to second floor with railing)  Home Access: Stairs to enter with rails  Entrance Stairs - Number of Steps: 2  Entrance Stairs - Rails: Right  Bathroom Shower/Tub: Walk-in shower  Bathroom Toilet: Standard  Bathroom Equipment: Shower chair, Toilet raiser  Home Equipment: [place]u [place], rolling, Cane (Polar care and EPC)  Has the patient had two or more falls in the past year or any fall with injury in the past year?: No  ADL Assistance: [address] [place] [address]: Independent (Pt reports he does all household chores)  Homemaking Responsibilities: Yes  Ambulation Assistance: Independent (No AD use prior)  Transfer Assistance: Independent  Active : Yes  Occupation: Full time employment  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  Vision/Hearing  Vision  Vision: Impaired (Pt denies any recent visual changes)  Vision Exceptions: Wears glasses for distance  Hearing  Hearing: Within functional limits    Cognition   Orientation  Overall Orientation Status: Within Functional Limits  Cognition  Overall Cognitive Status: WFL  Safety Judgement: Decreased awareness of need for safety     Objective   Observation/Palpation  Posture: Good  Observation: L MARY hose on upon arrival, RLE ace wrapped, polar care in place upon arrival & exit  Gross Assessment  Sensation: Impaired (Pt denying numbness/tingling initially however upon standing pt reporting he couldn't feel the floor underneath his R foot)     PROM RLE (degrees)  RLE PROM:  (assessed supine via passive heelslide)  RLE General PROM: Knee flex 0-25 degrees  AROM RLE (degrees)  RLE AROM:  (assessed supine via heelslide)  RLE General AROM: Knee flex 0-20 degrees  AROM LLE (degrees)  LLE AROM : WFL  AROM RUE (degrees)  RUE AROM : WFL  AROM LUE (degrees)  LUE AROM : WFL  Strength RLE  Comment: Quad set fair, glute set+  Strength LLE  Strength LLE: WFL  Comment: Grossly 5/5  Strength RUE  Comment: See OT assessment for detail  Strength LUE  Comment: See OT assessment for detail          Bed mobility  Supine to Sit: Maximum assistance;2 Person assistance  Sit to Supine: Moderate assistance;2 Person assistance  Scooting: Maximal assistance;2 Person assistance  Bed Mobility Comments: Pt w/ significant difficulty throughout bed mobility this date requiring increased time and effort to perform tasks throughout. Pt requiring max verbal and tactile cueing for initiation, progression, and sequencing of BLE & trunk throughout w/ fair return demo. Upon sitting at EOB, pt requiring max verbal cueing to let RLE relax in order for pt to place R foot on floor as pt initially sitting w/ RLE extended. Pt reporting mild lightheadedness throughout position changes which subsided w/ ~1-2 minutes. Assist required for safe line mgmt throughout. Transfers  Sit to Stand: Maximum Assistance;2 Person Assistance  Stand to Sit: Maximum Assistance;2 Person Assistance  Comment: Pt demonstrating unsteadiness throughout STS transfers this date and requiring max verbal cueing for proper hand placement throughout w/ fair return demo. Upon standing at EOB, pt denying any dizziness/lightheadedness.   While standing EOB, pt performed pre-gait lateral weight shifting and standing marches to assess quad control prior to initiating ambulation w/ poor steadiness noted throughout. Pt w/ episodes of R knee buckling throughout. Pt also noted to have HEAVY reliance on RW for UE support throughout. Pt also demonstrating poor eccentric quad control throughout stand>sit transfers w/ max verbal cueing. Ambulation  WB Status: RLE WBAT  Ambulation  Surface: Level tile  Device: Rolling Walker  Assistance: Maximum assistance;2 Person assistance  Quality of Gait: unsteady, step-to pattern, heavy reliance on RW for UE support, step-by-step cueing  Gait Deviations: Slow Lali  Distance: 2 steps laterally at EOB  Comments: Pt demonstrating poor steadiness throughout minimal ambulation at bedside this date requiring step-by-step cueing for weight shifting, knee extension, UE support, and LE progression throughout steps. Pt w/ episodes of R knee buckling and reporting \"I can't really feel the floor underneath me. \"  Unsafe to attempt ambulation away from EOB this session d/t decreased quad control. More Ambulation?: No  Stairs/Curb  Stairs?: No (Not appropriate or safe at this time given decreased quad control w/ episodes of R knee buckling throughout attempts at pre-gait activities.)     Balance  Posture: Good  Sitting - Static: Good;-  Sitting - Dynamic: Fair;+  Standing - Static: Poor;+  Standing - Dynamic: Poor  Single Leg Stance R Le  Single Leg Stance L Le  Comments: Standing balance assessed w/ RW  Exercise Treatment: ankle pumps, quad sets, glute sets, heelslides PROM & AROM        AM-PAC Score  AM-PAC Inpatient Mobility Raw Score : 9 (11/10/22 1540)  AM-PAC Inpatient T-Scale Score : 30.55 (11/10/22 1540)  Mobility Inpatient CMS 0-100% Score: 81.38 (11/10/22 1540)  Mobility Inpatient CMS G-Code Modifier : CM (11/10/22 1540)          Functional Outcome Measure-   Single Leg Stance Test:  0 sec.  (<5 sec.= fall risk)      Goals  Short Term Goals  Time Frame for Short Term Goals: 6 visits  Short Term Goal 1: Pt to demonstrate bed mobility independently  Short Term Goal 2: Pt to perform STS transfers w/ RW [place]  Short Term Goal 3: Pt to ambulate at least 50ft w/ RW [place]  Short Term Goal 4: Pt to ascend/descend 10 stairs w/ handrails SBA  Short Term Goal 5: Pt to be indep w/ TKA HEP  Patient Goals   Patient Goals : Less pain, to walk       Education  Patient Education  Education Given To: Patient  Education Provided: Role of Therapy;Plan of Care;Home Exercise Program;Precautions; Energy Conservation;Transfer Training;Equipment; Fall Prevention Strategies  Education Provided Comments: Significant time spent on pt education this date. Pt educated on: purpose of acute PT eval, importance of continued mobility throughout admission & upon discharge, general safety awareness, fall risk prevention, pursed lip breathing, paced breathing for pain control, TKA HEP, TKA precautions, safe transfers & ambulation w/ RW, proper gait sequence w/ RW s/p R TKA, and PT POC. Pt w/ fair return demo. Pt would benefit from continued reinforcement of education. Education Method: Demonstration;Verbal  Education Outcome: Verbalized understanding;Continued education needed      Therapy Time   First Session Second Session  Third Session  Co-treatment   Time In [id number]     Time Out [id number]     [facility] 88  89  83     Total Time: 122 minutes   Treatment time: 100 minutes     Taqueria Hughes returned for second session from [id number] this date to attempt further ambulation in order for safe discharge home. Pt supine upon arrival w/ spouse present at bedside. Pt reporting he is having \"tolerable\" pain and stating \"I just got pain meds. \"  Pt performed sup>sit requiring ModA x 2. Pt requiring increased time and effort to perform tasks throughout. Upon sitting at EOB, pt reporting lightheadedness. BP assessed and read 133/84 mmHg. Within ~2-3 minutes, pt reporting symptoms improving.   Pt then performed 2 STS transfers throughout session this date requiring ModA x 2 initially and Libby x 2 for second transfer. Upon standing initially, pt reporting increased lightheadedness. BP assessed and read 138/86 mmHg. Within ~2 minutes, pt reporting lightheadedness subsiding. Pt performed pre-gait lateral weight shifting and standing marches w/ R knee buckling noted throughout despite max verbal cueing for R knee extension and use of RW for BUE support throughout. Following second transfer, pt performed lateral steps at bedside requiring max verbal cueing for proper sequencing of BLE, weight shifting, and UE support throughout. R knee buckling noted throughout w/o fall. Pt continuing to report diminished R foot sensation throughout ambulation attempts and stating \"It doesn't feel stable\". Pt returned supine at end of session and repositioned comfortably. Following session, pt's wife at bedside expressing multiple concerns regarding pt being discharged this date as pt is 6'4\" and pt's wife stating \"If something happened I wouldn't be able to keep him from falling. \"  LARS Hutson notified. Red England, PT       Writer returned for a third session from [id number] per Dr. Jay Miller request to continue to progress mobility in hopes of same-day discharge. Pt supine upon arrival w/ LARS Spence present at bedside. Prior to initiating any ambulation a knee immobilizer was donned. Pt performed sup>sit Libby x 2 and denying any dizziness/lightheadedness throughout position changes. Upon sitting EOB, pt's SpO2 had dropped to 90% requiring ~1 minute to return to mid 90s w/ pursed lip breathing. Pt then performed 2 STS transfers throughout session demonstrating fair steadiness throughout requiring Min-ModA x 2. Upon standing at EOB, pt performed standing marches w/ decreased quad control noted throughout requiring max verbal cueing for R knee extension noted throughout.   Throughout pre-gait activities, pt's SpO2 dropped to 88% requiring seated rest break of ~1-2 minutes w/ pursed lip breathing for SpO2 to return to mid 90s. Pt then ambulated 50ft requiring Libby-ModA w/ max verbal cueing for proper gait sequence w/ RW s/p R TKA w/ fair return demo. Pt also noted to bear minimal weight through RLE throughout RLE stance phase of gait w/ heavy reliance on RW for UE support. Pt not appropriate to attempt stair negotiation this session d/t decreased quad control noted throughout. Pt returned supine at end of session Libby. LARS Bradshaw present in room w/ pt upon writer's exit. Pt would benefit from continued skilled PT to address strength, balance, gait, stair negotiation, and endurance deficits s/p R TKA in order to maximize independence w/ functional mobility and return to PLOF as able.       Eliseo Segura, PT

## 2022-11-10 NOTE — PLAN OF CARE
Problem: Chronic Conditions and Co-morbidities  Goal: Patient's chronic conditions and co-morbidity symptoms are monitored and maintained or improved  Outcome: Progressing  Flowsheets (Taken 11/10/2022 1150)  Care Plan - Patient's Chronic Conditions and Co-Morbidity Symptoms are Monitored and Maintained or Improved: Monitor and assess patient's chronic conditions and comorbid symptoms for stability, deterioration, or improvement     Problem: Discharge Planning  Goal: Discharge to home or other facility with appropriate resources  Outcome: Progressing  Flowsheets (Taken 11/10/2022 1150)  Discharge to home or other facility with appropriate resources: Identify barriers to discharge with patient and caregiver     Problem: Pain  Goal: Verbalizes/displays adequate comfort level or baseline comfort level  Outcome: Progressing     Problem: Skin/Tissue Integrity  Goal: Absence of new skin breakdown  Description: 1. Monitor for areas of redness and/or skin breakdown  2. Assess vascular access sites hourly  3. Every 4-6 hours minimum:  Change oxygen saturation probe site  4. Every 4-6 hours:  If on nasal continuous positive airway pressure, respiratory therapy assess nares and determine need for appliance change or resting period.   Outcome: Progressing     Problem: Safety - Adult  Goal: Free from fall injury  Outcome: Progressing     Problem: ABCDS Injury Assessment  Goal: Absence of physical injury  Outcome: Progressing     Problem: Neurosensory - Adult  Goal: Achieves stable or improved neurological status  Outcome: Progressing     Problem: Musculoskeletal - Adult  Goal: Return mobility to safest level of function  Outcome: Progressing

## 2022-11-10 NOTE — ANESTHESIA POSTPROCEDURE EVALUATION
Department of Anesthesiology  Postprocedure Note    Patient: Maximo Rodriguez  MRN: [id number]  YOB: 1970  Date of evaluation: 11/10/2022      Procedure Summary     Date: 11/10/22 Room / Location: [address] [place] [address] / [facility] - INPATIENT    Anesthesia Start: 0832 Anesthesia Stop: 1172    Procedure: RIGHT KNEE TOTAL ARTHROPLASTY - Beni Minal (Right: Knee) Diagnosis:       Primary osteoarthritis of one knee, right      (Primary osteoarthritis of one knee, right [M17.11])    Surgeons: Lily Rousseau MD Responsible Provider: Alber Guan MD    Anesthesia Type: general ASA Status: 2          Anesthesia Type: No value filed.     Adeline Phase I: Adeline Score: 8    Adeline Phase II:        Anesthesia Post Evaluation    Patient location during evaluation: PACU  Patient participation: complete - patient participated  Level of consciousness: awake  Airway patency: patent  Nausea & Vomiting: no nausea and no vomiting  Complications: no  Cardiovascular status: hemodynamically stable  Respiratory status: acceptable  Hydration status: stable  Multimodal analgesia pain management approach

## 2022-11-10 NOTE — DISCHARGE INSTRUCTIONS
ANY ORTHOPEDIC QUESTIONS OR ANY OTHER CONCERNS YOU MAY CALL THE ORTHOPEDIC COORDINATOR:  Becky Valentine RN, MSN  [phone]  [email or web address]    DISCHARGE INSTRUCTIONS  Caring for yourself after joint replacement surgery (Total Hip and Total Knee Replacement)    Activity and Therapy  Receive physical therapy three times per week. (Pain medication one hour prior to therapy)   Perform PT exercises on own when not receiving home or outpatient PT. Ideally exercises should be at least two times a day. Increase level of activity and ambulation each day. Perform deep breathing exercises daily. Patient provides self-care when possible. Work on Range of motion for Total knee patients. No pillow under the knee for Total knee patients. Elevate the surgical leg when seated. Diet:  Increase oral intake of fruits, fiber and water to prevent constipation. Drink fluids frequently and take stool softeners to aid in bowel motility. Increase protein intake/reduce high-sugar intake to help promote healing and prevent infection. Incision Care:  Keep Aquacel or other dressing intact until seen and removed by surgeon, unless saturated, in which case, call surgeon and request instructions. If dressing falls off, call surgeon. [facility] on in the am and off in the pm to reduce swelling. Ice affected area four times a day, for twenty minutes. Pain Medications and Anticoagulant  You have been place on an anticoagulant to prevent blood clots. Take this medication exactly as prescribed. Be alert for signs of bleeding. Take care not to injure yourself. You have been provided pain medicine to control your pain. Do not take more narcotics than prescribed. You may begin weaning from narcotics as your pain level improves by decreasing the amount or frequency of the narcotics. You may also take plain acetaminophen as an alternate to the narcotics. Never exceed the recommended dosage.    Ice, rest and elevating the surgical limb also help with pain control. When to call the Surgeon:  Increased redness, warmth, drainage, swelling or odor from incision site. Temperature above 101 degrees. Pain not controlled by prescribed medications. Calf tenderness, swelling, or redness. Shortness of breath or chest pain. If you cannot urinate and have been consuming liquids  Any incision or surgical-related concerns. Call surgeon with concerns PRIOR TO going to hospital.    Normal Conditions:  Swelling in the operative leg: this should reduce over time. Bruising behind the knee and around surgical area. Some post-operative pain. Constipation related to pain medications/decreased mobility. (Increase fiber & water intake.)   Slight warmth of operative leg. Fatigue and moderate pain after therapy. Numbness near the incision site. Nausea - take pain medications with food. Cut back on pain medication. NOTE: Remember to go to follow-up orthopedic appointment with surgeon      Keep it Clean - Post-Operative Home instructions    These instructions are to help you have the best possible recovery after your surgical procedure. [facility] is here to support you. If you have questions, call [phone] Monday through Friday from 7:30AM to 8:30PM to speak to a nurse. If you need to speak to someone outside of these hours, call your physician. Incision Dos and Donts  Do wash hands before and after dressing changes or when you have had any contact with your incision. Use hand  or antibacterial soap. Do keep your incision clean and dry. Its OK to wash the skin around your incision with mild soap and water. Do change your dressing as you were told. Do notify your doctor if the dressing becomes wet or dirty. Do use a clean washcloth every time when cleaning your incision. Do sleep on clean linens. Do keep pets away from incision site.   Dont sit in a bathtub, pool, or hot tub until your incision is fully closed and any drains are removed. Dont scrub, pick, scratch, or pull at your incision. Dont use oils, lotions, or creams on your incision unless your healthcare provider approves it. Follow-up  You will have one or more follow-up visits with your healthcare provider. These are needed to check how well youre healing. Your drain, stitches, or staples may also be removed during these visits. Do not miss your follow-up visit, even if you are feeling better. Call your healthcare provider right away if you have the following:  Fever of 100.4°F (38°C) or higher, or as advised by your healthcare provider. Chest pain or trouble breathing. Pain or tenderness in your leg(s). Increased pain, redness, swelling, bleeding, or foul-smelling drainage at the incision site. Incision changes, separates or is hot to the touch. Problems with the drain if you have one. Itchy, swollen skin; skin rash. Medicines, Diet, and Activity:   Refer to your discharge paperwork for further instructions      [facility] SURGICAL DRESSING:    A waterproof barrier to protect skin and allow bathing. Impregnated with Silver, which gives it antimicrobial properties. Skin friendly, comfortable and flexible. BECAUSE IT IS WATERPROOF THERE ARE NO DRESSING CHANGES. YOU CAN SHOWER POST OP DAY 1. This dressing will stay on until your first follow-up appointment. No dressing changes at home. Call surgeon if dressing becomes saturated beyond borders. Benefits:   Reduced surgical site infection, reduced re-admissions, improved healing.

## 2022-11-10 NOTE — H&P
History and Physical Update    Pt Name: Clarisse Willis  MRN: [id number]  YOB: 1970  Date of evaluation: 11/10/2022    [x] I have examined the patient and reviewed the H&P/Consult and there are no changes to the patient or plans.     [] I have examined the patient and reviewed the H&P/Consult and have noted the following changes:        Jerardo Hunter MD   Electronically signed 11/10/2022 at 7:00 AM

## 2022-11-10 NOTE — ANESTHESIA PRE PROCEDURE
Department of Anesthesiology  Preprocedure Note       Name:  Astrid Joshi   Age:  45 y.o.  :  1970                                          MRN:  [id number]         Date:  11/10/2022      Surgeon: Judd Mckenna):  Katie Soria MD    Procedure: Procedure(s):  RIGHT KNEE TOTAL ARTHROPLASTY - Graham Soto BIOMET    Medications prior to admission:   Prior to Admission medications    Medication Sig Start Date End Date Taking?  Authorizing Provider   ibuprofen (ADVIL;MOTRIN) 200 MG tablet Take 200 mg by mouth every 6 hours as needed for Pain    Historical Provider, MD   Omega-3 Fatty Acids (FISH OIL) 1000 MG capsule Take by mouth daily    Historical Provider, MD   Magnesium 100 MG CAPS Take by mouth daily    Historical Provider, MD   ASPIRIN 81 PO Take 81 mg by mouth daily    Historical Provider, MD   meloxicam (MOBIC) 15 MG tablet Take 15 mg by mouth daily 22   Historical Provider, MD   metFORMIN (GLUCOPHAGE-XR) 500 MG extended release tablet Take 1 tablet by mouth daily (with breakfast) 22   Erin Prather MD   vitamin D (ERGOCALCIFEROL) 1.25 MG (72291 UT) CAPS capsule Take 1 capsule by mouth once a week x8 weeks 22   Erin Prather MD   potassium chloride (KLOR-CON M) 10 MEQ extended release tablet Take 1 tablet by mouth daily 22   Erin Prather MD   amLODIPine (NORVASC) 10 MG tablet Take 10 mg by mouth daily 22   Historical Provider, MD   labetalol (NORMODYNE) 200 MG tablet Take 200 mg by mouth 2 times daily    Historical Provider, MD   hydroCHLOROthiazide (HYDRODIURIL) 12.5 MG tablet Take 12.5 mg by mouth daily    Historical Provider, MD   acetaminophen (TYLENOL) 500 MG tablet Take 500 mg by mouth every 6 hours as needed for Pain    Historical Provider, MD   Multiple Vitamins-Minerals (MULTIVITAMIN ADULT PO) Take 1 tablet by mouth daily     Historical Provider, MD   rosuvastatin (CRESTOR) 10 MG tablet Take 10 mg by mouth daily    Historical Provider, MD       Current medications:    No current facility-administered medications for this visit. No current outpatient medications on file.      Facility-Administered Medications Ordered in Other Visits   Medication Dose Route Frequency Provider Last Rate Last Admin    ceFAZolin (ANCEF) 3,000 mg in dextrose 5 % 100 mL IVPB  3,000 mg IntraVENous Once Ella Bowman MD        [START ON 11/11/2022] lidocaine PF 1 % injection 1 mL  1 mL IntraDERmal Once PRN Trish Donovan, DO        [START ON 11/11/2022] 0.9 % sodium chloride infusion   IntraVENous Continuous Eusebiojose luis Hael, DO        [START ON 11/11/2022] lactated ringers infusion   IntraVENous Continuous Eusebio W Hale, DO        sodium chloride flush 0.9 % injection 5-40 mL  5-40 mL IntraVENous 2 times per day Eusebio HYACINTH Hale, DO        sodium chloride flush 0.9 % injection 5-40 mL  5-40 mL IntraVENous PRN Trish Donovan, DO        0.9 % sodium chloride infusion   IntraVENous PRN Trish Donovan, DO        acetaminophen (TYLENOL) tablet 1,000 mg  1,000 mg Oral Once Zita Nicole MD        gabapentin (NEURONTIN) capsule 300 mg  300 mg Oral Once Zita Nicole MD        celecoxib (CELEBREX) capsule 200 mg  200 mg Oral Once Zita Niocle MD           Allergies:  No Known Allergies    Problem List:    Patient Active Problem List   Diagnosis Code    Hyperlipidemia E78.5    Atrial fibrillation with rapid ventricular response ([address] I48.91    Atrial fibrillation with RVR ([address] I48.91    Essential hypertension I10    Hypotension due to drugs I95.2    S/P ablation of atrial fibrillation Z98.890, Z86.79    Elevated triglycerides with high cholesterol E78.2    Hypokalemia E87.6    Liver enzyme elevation R74.8    Vitamin D deficiency E55.9    Type 2 diabetes mellitus without complication, with long-term current use of insulin (HCC) E11.9, Z79.4       Past Medical History:        Diagnosis Date    Arthritis     Atrial fibrillation ([address]     COVID-19 11/2021    Mild per pt., no treatment    Diabetes mellitus ([address]     Type 2, does not check sugars at home, on Metformin, managed by PCP (10/20/22)    Hyperlipidemia     Hypertension     Neuropathy     Tingling in both feet at night per pt.  Skin cancer     Sleep apnea     Wears CPAP       Past Surgical History:        Procedure Laterality Date    ABLATION OF DYSRHYTHMIC FOCUS  07/2019    CARDIOVERSION  06/07/2019    CARDIOVERSION  07/01/2019    by Dr. Maurilio Kern. 1 shock @ 200 converted to NSR    COLONOSCOPY      INSERTABLE CARDIAC MONITOR  2019    Loop recorder in place (10/20/22)    KNEE ARTHROSCOPY Bilateral     MOHS SURGERY      1st stage head and neck    SEPTOPLASTY Bilateral 11/29/2021    SEPTOPLASTY BILATERAL TURBINATE REDUCTION performed by Davon Sheppard MD at [place] [id number] TRANSESOPHAGEAL ECHOCARDIOGRAM  06/07/2019    VEIN SURGERY Left        Social History:    Social History     Tobacco Use    Smoking status: Never    Smokeless tobacco: Never   Substance Use Topics    Alcohol use: Yes     Alcohol/week: 3.3 standard drinks     Types: 4 drink(s) per week     Comment: socially                                Counseling given: Not Answered      Vital Signs (Current): There were no vitals filed for this visit.                                            BP Readings from Last 3 Encounters:   11/10/22 118/78   10/20/22 (!) 151/90   10/24/22 122/80       NPO Status:                                                                                 BMI:   Wt Readings from Last 3 Encounters:   11/10/22 289 lb (131.1 kg)   10/20/22 289 lb 4.8 oz (131.2 kg)   10/24/22 289 lb (131.1 kg)     There is no height or weight on file to calculate BMI.    CBC:   Lab Results   Component Value Date/Time    WBC 6.6 10/20/2022 08:33 AM    RBC 4.79 10/20/2022 08:33 AM    HGB 14.9 10/20/2022 08:33 AM    HCT 44.0 10/20/2022 08:33 AM    MCV 91.9 10/20/2022 08:33 AM    RDW 12.2 10/20/2022 08:33 AM    PLT 238 10/20/2022 08:33 AM       CMP:   Lab Results   Component Value Date/Time     10/20/2022 08:33 AM    K 4.1 10/20/2022 08:33 AM     10/20/2022 08:33 AM    CO2 27 10/20/2022 08:33 AM    BUN 21 10/20/2022 08:33 AM    CREATININE 1.33 10/20/2022 08:33 AM    GFRAA >60 09/07/2022 07:35 AM    LABGLOM >60 10/20/2022 08:33 AM    GLUCOSE 119 10/20/2022 08:33 AM    PROT 6.1 06/06/2019 04:25 AM    CALCIUM 9.6 10/20/2022 08:33 AM    BILITOT 1.11 06/06/2019 04:25 AM    ALKPHOS 42 06/06/2019 04:25 AM    AST 36 09/07/2022 07:35 AM    ALT 55 09/07/2022 07:35 AM       POC Tests:   Recent Labs     11/10/22  0634   POCGLU 138*       Coags:   Lab Results   Component Value Date/Time    PROTIME 11.3 06/05/2019 07:25 AM    INR 1.1 06/05/2019 07:25 AM    APTT 25.9 06/05/2019 07:25 AM       HCG (If Applicable): No results found for: PREGTESTUR, PREGSERUM, HCG, HCGQUANT     ABGs: No results found for: PHART, PO2ART, LSL3IFY, UAE5NRQ, BEART, A4BTASYQ     Type & Screen (If Applicable):  No results found for: LABABO, LABRH    Drug/Infectious Status (If Applicable):  No results found for: HIV, HEPCAB    COVID-19 Screening (If Applicable): No results found for: COVID19        Anesthesia Evaluation  Patient summary reviewed and Nursing notes reviewed no history of anesthetic complications:   Airway: Mallampati: II  TM distance: >3 FB   Neck ROM: full  Mouth opening: > = 3 FB   Dental: normal exam         Pulmonary:normal exam    (+) sleep apnea: on CPAP,      (-) COPD and asthma                           Cardiovascular:  Exercise tolerance: no interval change,   (+) hypertension:, dysrhythmias (2019 - a fib ablasion):, hyperlipidemia    (-) past MI and CABG/stent    ECG reviewed    Rate: normal           Beta Blocker:  Dose within 24 Hrs         Neuro/Psych:      (-) CVA           GI/Hepatic/Renal:        (-) liver disease and no renal disease       Endo/Other:    (+) DiabetesType II DM, , : arthritis:., .                 Abdominal: Vascular: Other Findings:             Anesthesia Plan      general     ASA 2       Induction: intravenous. MIPS: Prophylactic antiemetics administered. Anesthetic plan and risks discussed with patient. Plan discussed with CRNA.     Attending anesthesiologist reviewed and agrees with Preprocedure content            afib s/p ablation     Sg Smith MD   11/10/2022

## 2022-11-10 NOTE — INTERVAL H&P NOTE
Interval H&P Note    Pt Name: Alicia Garcias  MRN: [id number]  YOB: 1970  Date of evaluation: 11/10/2022      [x] I have reviewed in epic the H&P by A Seth TRACEY done in [facility] for an Interval History and Physical note. [x] I have examined  Alicia Garcias  There are no changes to the patient who is scheduled for RIGHT KNEE TOTAL ARTHROPLASTY- NITHYA BIOMET by Lilian Graves MD FOR PRIMARY OSTEOARTHRITIS OF ONE KNEE, RIGHT [M17.11]. The patient denies new health changes, fever, chills, wheezing, cough, increased SOB, chest pain, open sores or wounds. Last ASA 81mg 11/4/22 Ibuprofen and Motrin 11/9/22  +DM  POC      Hx A Fib S/p cardiac ablation 7/2019 and cardioversion by Dr Hiwot Saleh. Loop recorder Follows with cardiologist Dr Brennen Foley and given Cardiac Clearance  Patient is in normal sinus rhythm. He denies palpitations, dizziness, lightheadedness, syncope, [employer] or chest pain     Vital signs: /78   Pulse 78   Temp 97.8 °F (36.6 °C) (Temporal)   Resp 20   Ht 6' 4\" (1.93 m)   Wt 289 lb (131.1 kg)   SpO2 93%   BMI 35.18 kg/m²      Allergies:  Patient has no known allergies. Medications:    Prior to Admission medications    Medication Sig Start Date End Date Taking?  Authorizing Provider   Omega-3 Fatty Acids (FISH OIL) 1000 MG capsule Take by mouth daily    Historical Provider, MD   Magnesium 100 MG CAPS Take by mouth daily    Historical Provider, MD   ASPIRIN 81 PO Take 81 mg by mouth daily    Historical Provider, MD   meloxicam (MOBIC) 15 MG tablet Take 15 mg by mouth daily 9/19/22   Historical Provider, MD   metFORMIN (GLUCOPHAGE-XR) 500 MG extended release tablet Take 1 tablet by mouth daily (with breakfast) 9/20/22   Justine Peterson MD   vitamin D (ERGOCALCIFEROL) 1.25 MG (69419 UT) CAPS capsule Take 1 capsule by mouth once a week x8 weeks 9/20/22   Justine Peterson MD   potassium chloride (KLOR-CON M) 10 MEQ extended release tablet Take 1 tablet by mouth daily 9/20/22 Maria Dolores Meza MD   amLODIPine (NORVASC) 10 MG tablet Take 10 mg by mouth daily 5/19/22   Historical Provider, MD   labetalol (NORMODYNE) 200 MG tablet Take 200 mg by mouth 2 times daily    Historical Provider, MD   hydroCHLOROthiazide (HYDRODIURIL) 12.5 MG tablet Take 12.5 mg by mouth daily    Historical Provider, MD   acetaminophen (TYLENOL) 500 MG tablet Take 500 mg by mouth every 6 hours as needed for Pain    Historical Provider, MD   Multiple Vitamins-Minerals (MULTIVITAMIN ADULT PO) Take 1 tablet by mouth daily     Historical Provider, MD   rosuvastatin (CRESTOR) 10 MG tablet Take 10 mg by mouth daily    Historical Provider, MD         This is a 45 y. o.obese male who is pleasant, cooperative, alert and oriented x3, in no acute distress. Heart: Heart sounds are normal.  HR 78 regular rate and rhythm without murmur, gallop or rub. Lungs: Normal respiratory effort with equal expansion, good air exchange, unlabored and clear to auscultation without wheezes or rales bilaterally   Abdomen: obese, round, soft, nontender, nondistended with bowel sounds . Labs:  Recent Labs     10/20/22  0833   HGB 14.9   HCT 44.0   WBC 6.6   MCV 91.9         K 4.1      CO2 27   BUN 21*   CREATININE 1.33*   GLUCOSE 119*       No results for input(s): COVID19 in the last 720 hours.     ISAIAH Jaquez CNP  Electronically signed 11/10/2022 at 6:26 AM

## 2022-11-10 NOTE — PROGRESS NOTES
Occupational Therapy  Facility/Department: [facility] MED SURG  Occupational Therapy Initial Assessment    Name: Daiana Reid  : 1970  MRN: [id number]  Date of Service: 11/10/2022    RN Sinai Mendosa reports patient is medically stable for therapy treatment this date. Chart reviewed prior to treatment and patient is agreeable for therapy. All lines intact and patient positioned comfortably at end of treatment. All patient needs addressed prior to ending therapy session. Discharge Recommendations:  Patient would benefit from continued therapy after discharge  OT Equipment Recommendations  Equipment Needed: Yes  Mobility Devices: ADL Assistive Devices  ADL Assistive Devices: Long-handled Shoe Horn;Long-handled Sponge;Reacher;Sock-Aid Hard       Patient Diagnosis(es): The encounter diagnosis was Acute postoperative pain. Past Medical History:  has a past medical history of Arthritis, Atrial fibrillation ([facility] [address] COVID-19, Diabetes mellitus ([facility] [address] Hyperlipidemia, Hypertension, Neuropathy, Skin cancer, and Sleep apnea. Past Surgical History:  has a past surgical history that includes transesophageal echocardiogram (2019); Cardioversion (2019); Mohs surgery; Cardioversion (2019); ablation of dysrhythmic focus (2019); Colonoscopy; skin biopsy; Vein Surgery (Left); Knee arthroscopy (Bilateral); Septoplasty (Bilateral, 2021); Insertable Cardiac Monitor (); and Total knee arthroplasty (Right, 11/10/2022). R TKA- Dr. Jesse Adame       Assessment   Performance deficits / Impairments: Decreased functional mobility ; Decreased ADL status; Decreased safe awareness;Decreased balance;Decreased posture;Decreased high-level IADLs;Decreased endurance;Decreased sensation  Assessment: Skilled OT services are currently indicated to increase I and safety during functional tasks to return home at [facility] as able.  Pt is currently limited by buckling/decrease quad control and sensation in R LE, at this time it is not safe to mobilize patient away from bed. Prognosis: Good  Decision Making: Medium Complexity  Activity Tolerance  Activity Tolerance: Patient Tolerated treatment well;Treatment limited secondary to medical complications (free text)  Activity Tolerance Comments: fair, pt cooperative throughout session currently limited by decrease quad control, decreased sensation in R LE and lightheadedness. Plan   Occupational Therapy Plan  Times Per Week: 5-6x/wk 1-2x/day as lauren  Current Treatment Recommendations: Strengthening, Balance training, Functional mobility training, Endurance training, Safety education & training, Equipment evaluation, education, & procurement, Self-Care / ADL, Home management training, Patient/Caregiver education & training, Pain management     Restrictions  Restrictions/Precautions  Restrictions/Precautions: General Precautions, Fall Risk, Weight Bearing  Required Braces or Orthoses?: No  Lower Extremity Weight Bearing Restrictions  Right Lower Extremity Weight Bearing: Weight Bearing As Tolerated  Position Activity Restriction  Other position/activity restrictions: B thigh MARY hose, LUE IV, 2L O2 NC, continuous pulse ox    Subjective   General  Chart Reviewed: Yes  Patient assessed for rehabilitation services?: Yes  Family / Caregiver Present: Yes (Pts wife in room)  Subjective  Subjective: Pt resting in bed.  Pt appears apprehensive to work with therapy but agreeable to OT eval.     Social/Functional History  Social/Functional History  Lives With: Spouse, Daughter (13 year old)  Type of Home: House  Home Layout: Two level, 1/2 bath on main level, Bed/Bath upstairs (full flight of stairs to second floor with railing)  Home Access: Stairs to enter with rails  Entrance Stairs - Number of Steps: 2  Entrance Stairs - Rails: Right  Bathroom Shower/Tub: Walk-in shower  Bathroom Toilet: Standard  Bathroom Equipment: Shower chair, Toilet raiser  Home Equipment: [employer] [place], [employer] (Polar care and EPC)  Has the patient had two or more falls in the past year or any fall with injury in the past year?: No  ADL Assistance: [address]: Independent (Pt reports he does all household chores)  Homemaking Responsibilities: Yes  Ambulation Assistance: Independent (No AD use prior)  Transfer Assistance: Independent  Active : Yes  Occupation: Full time employment  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 Pt attempted weight shifitng and steps in place. Pt requiring signifcant use of B UE on RW for support. Buckling in R knee noted pt reporting difficulty feeling floor under foot and not feeling like he can trust his knee.)  Functional Mobility   Overall Level of Assistance: Assist X2;Maximum assistance( Pt able to complete 3-4 small lateral steps at EOB with Max x2 assist and RW. Pt with HEAVY reliance on B UE for support throughout.)  Interventions: Verbal cues; Tactile cues (Pt currently requiring step by step cues throughout. Pt given MAX verbal cues for upright posture, pursed lip breathing, RW safety, step sequence with RW, extending B UE on RW for increase support, weight shfitng, initaiton.)       AROM: Within functional limits  PROM: Within functional limits  Strength:  Within functional limits (B UE ~ 5/5)  Coordination: Within functional limits  Tone: Normal  Sensation: Impaired (Pt reports when he goes to bed his feet are tingling, decreased sensation noted in R LE.)      ADL  Feeding: Setup  Grooming: Setup;Stand by assistance  UE Bathing: Setup;Minimal assistance  LE Bathing: Setup; Moderate assistance;Maximum assistance  UE Dressing: Setup;Minimal assistance  LE Dressing: Setup;Maximum assistance;Dependent/Total (for donning B socks while supine in bed.)  Toileting: Maximum assistance  Additional Comments: Pt is limited currently by surgical pain, fatigue, decreased motor control in sugical LE. Activity Tolerance  Activity Tolerance: Patient limited by endurance;Treatment limited secondary to medical complications  Activity Tolerance Comments: Further activity limited 2/2 decreased quad control & decreased sensation. PT will return for second session to progress activity as able prior to discharge. Bed mobility  Supine to Sit: Maximum assistance;2 Person assistance  Sit to Supine: Moderate assistance;2 Person assistance  Scooting: Maximal assistance;2 Person assistance  Bed Mobility Comments: [facility]. Pt required increased time/effort to complete. Pt given MAX verbal cues for pacing, use of bedrails, initaition, sequencing, progression on BLEs and trunk throughout. Upon sitting EOB pt reporting feeling lightheaded but symptoms subsided after ~ 1-2 min. While seated EOB pt required MAX verbal cues to place R foot on floor as pt attempting to sit with BLE extended. Transfers  Sit to stand: Maximum assistance;2 Person assistance (with RW)  Stand to sit: Maximum assistance;2 Person assistance  Transfer Comments: Pt completed STS with significant difficulites this date. Pt required MAX verbal cues for RW safety, controlled stand to sit, reaching back to surface prior to sitting, lines mgmt, upright posture, extending B UE on RW for increased support, sequencing, intiation and pursed lip breathing. Pt required step by step cues throughout transfer.       Vision  Vision: Impaired (Pt denies any recent visual changes)  Vision Exceptions: Wears glasses for distance  Hearing  Hearing: Within functional limits  Cognition  Overall Cognitive Status: WFL  Safety Judgement: Decreased awareness of need for safety  Orientation  Overall Orientation Status: Within Functional Limits                  Education Given To: Patient  Education Provided: Role of Therapy;Transfer Training;Plan of Care;Energy Conservation; Fall Prevention Strategies; ADL Adaptive Strategies; Home Exercise Program;Precautions; Family Education  Education Provided Comments: safety in function, fall prevention/call light use, OT POC, role of OT in acute care, benefits of being OOB, RW use/safety, proper bed mobility tech, MARY hose wear/purpose, use of medicated soap for infection prevention, safe car transfers  Education Method: Verbal  Barriers to Learning: None;Cognition  Education Outcome: Verbalized understanding;Continued education needed                                       AM-PAC Score        AM-PAC Inpatient Daily Activity Raw Score: 16 (11/10/22 1848)  AM-PAC Inpatient ADL T-Scale Score : 35.96 (11/10/22 1848)  ADL Inpatient CMS 0-100% Score: 53.32 (11/10/22 1848)  ADL Inpatient CMS G-Code Modifier : CK (11/10/22 1848)        Goals  Short Term Goals  Time Frame for Short Term Goals: By discharge, pt to demo  Short Term Goal 1: ADL transfers and functional mobility to SBA with use of AD as needed. Short Term Goal 2: bed mobility to SBA with use of bed rails as needed. Short Term Goal 3: UB ADLs to Set up and LB ADLs to Min A with use of AD/AE as needed. Short Term Goal 4: toileting to SBA with use of AD/grab bars as needed. Short Term Goal 5: I with fall prevention education, EC/WS tech, recommendations for AE, safe car transfers and MARY hose wear/purpose with use of handouts as needed. Patient Goals   Patient goals : To walk more and have less pain       Co-treatment with PT warranted first time up day of surgery.  Cotx due to potential risk of decreased sensation, muscle control and proprioception from spinal epidural and/or regional block. Decreased safety and independence requiring 2 skilled therapy professionals to address individual discipline's goals. Therapy Time   First Session Second Session Group Third Session   Time In [id number]   1383   Time Out [id number]   3564   [place] 05 55   22    Total time = 122 min  Total tx time 110 min    Pt was seen for second session from [id number]. Pt resting in bed with wife in room. Pt reporting he was having increased pain in surgical knee, RN gave medication prior to writers arrival. Pt agreeable to OT session. Pt completed supine to sit with Mod x2 assist. Pt given Max verbal cues for initiation, progression, use of bedrail, pacing self, line mgmt all to increase ease/safety. Pt reported feeling lightheaded once seated on EOB, BP assessed 133/84 mmHg. Symptoms subsided after ~ 2-3 min with cues for pursed lip breathing, pts SpO2 dropping to ~ 90-92% during activity with 2L O2, recovering within 2 min with cues for pursed lip breathing. Pt completed STS with Mod x2 assist with RW. Pt given MAX verbal cues for RW safety, initiation, sequencing, line mgmt, pursed lip breathing all to increase safety. Pt completed lateral steps at EOB with Mod x2 assist and RW. Pt requested to attempt to pee using urinal and x2 staff assist to stand safely with RW, pt unable to urinate RN notifed. Pt continued to report feeling as though he cant \"trust\" his knee. Decreased quad control/difficulties extending knee noted throughout. Pt sat back on EOB and assisted back to supine with Mod x2 assist. Pt's wife expressing multiple concerns about pt being discharged d/t size difference, stating. \"if something happened I wouldn't be able to keep him from falling\" . RN notified. Pt let resting comfortably in bed at end of session with alarm on and call light within reach.        Pt was seen for third session from 1740 to 1837 per Dr. Hank Phillips request to continued to progress further mobility. Pt resting in bed with LARS Bowles in room. Pt with knee brace on with no locking mechanism, assisted pt with doffing knee brace and donning long LE immobilizer while supine in bed. Pt given Min A for supine to sit with cues for initiation, use of bed rails, and pursed lip breathing. Pt reporting slight lightheadedness once seated on EOB, symptoms subsided after ~ 1-2 min. Pt completed STS with Minx2 assist and RW. Pt given Mod verbal cues for RW safety, upright posture, pursed lip breathing, weight shifting and lines mgmt. Pt completed pregait activities including weight shifting and steps in place at EOB with heavy reliance on RW, reporting his R LE feeling \"funny\". SpO2 dropping to ~ 88% during activity on room air. Pt sat on EOB given Max verbal cues for pursed lip breathing and SpO2 recovered to > 92% after ~ 1-2 min. Pt then completed STS and functional mobility from bed to door down hallway back to room to toilet ~ 40ft with Min to Mod A and RW. Decreased quad control noted despite knee immobilizer. Pt with heavy reliance on B UE on RW bearing minimal weight through R LE. Pt completed toilet transfer with Mod x2 assist, pt urinated and RN notified. Pt stood with Mod assist and RW. MAX verbal cues needed for RW Safety, use of grab bars and pursed lip breathing. Pt ambulated from toilet back to bed and assisted to supine with Min A. Pt resting in bed at end of session LARS Lucas in room and call light within reach .         Theo Pickett, OT

## 2022-11-10 NOTE — DISCHARGE INSTR - COC
Continuity of Care Form    Patient Name: Soo Paredes   :  1970  MRN:  [id number]    Admit date:  11/10/2022  Discharge date:  2022    Code Status Order: Prior   Advance Directives:   [facility] Documentation       Date/Time Healthcare Directive Type of Healthcare Directive Copy in [address] Agent's Name Healthcare Agent's Phone Number    11/10/22 1400 No, patient does not have an advance directive for healthcare treatment -- -- -- -- --            Admitting Physician:  Oneyda Duran MD  PCP: Alexsander Jacobson MD    Discharging Nurse: [facility]  Discharging Unit Phone Number: [phone]    Emergency Contact:   Extended Emergency Contact Information  Primary Emergency Contact: Heidi Hernandez [address] Phone: [phone]  Mobile Phone: [phone]  Relation: Spouse    Past Surgical History:  Past Surgical History:   Procedure Laterality Date    ABLATION OF DYSRHYTHMIC FOCUS  2019    CARDIOVERSION  2019    CARDIOVERSION  2019    by Dr. Syl Meza.  1 shock @ 200 converted to NSR    COLONOSCOPY      INSERTABLE CARDIAC MONITOR  2019    Loop recorder in place (10/20/22)    KNEE ARTHROSCOPY Bilateral     MOHS SURGERY      1st stage head and neck    SEPTOPLASTY Bilateral 2021    SEPTOPLASTY BILATERAL TURBINATE REDUCTION performed by Sincere Leon MD at [facility]      TRANSESOPHAGEAL ECHOCARDIOGRAM  2019    VEIN SURGERY Left        Immunization History:   Immunization History   Administered Date(s) Administered    Influenza Virus Vaccine 2019    Influenza, FLUARIX, FLULAVAL, FLUZONE (age 10 mo+) AND AFLURIA, (age 1 y+), PF, 0.5mL 10/24/2022    Tdap (Boostrix, Adacel) 11/15/2017       Active Problems:  Patient Active Problem List   Diagnosis Code    Hyperlipidemia E78.5    Atrial fibrillation with rapid ventricular response ([facility] [address] I48.91    Atrial fibrillation with RVR ([facility]) I48.91    Essential hypertension I10    Hypotension due to drugs I95.2    S/P ablation of atrial fibrillation Z98.890, Z86.79    Elevated triglycerides with high cholesterol E78.2    Hypokalemia E87.6    Liver enzyme elevation R74.8    Vitamin D deficiency E55.9    Type 2 diabetes mellitus without complication, with long-term current use of insulin ([facility]) E11.9, Z79.4       Isolation/Infection:   Isolation            No Isolation          Patient Infection Status       None to display            Nurse Assessment:  Last Vital Signs: /78   Pulse 78   Temp 97.8 °F (36.6 °C) (Temporal)   Resp 20   Ht 6' 4\" (1.93 m)   Wt 289 lb (131.1 kg)   SpO2 93%   BMI 35.18 kg/m²     Last documented pain score (0-10 scale): Pain Level: 3  Last Weight:   Wt Readings from Last 1 Encounters:   11/10/22 289 lb (131.1 kg)     Mental Status:  oriented and alert    IV Access:  - None    Nursing Mobility/ADLs:  Walking   Independent  Transfer  Independent  Bathing  Assisted  Dressing  Assisted  Toileting  Independent  Feeding  Independent  Med Admin  Independent  Med Delivery   whole    Wound Care Documentation and Therapy:        Elimination:  Continence: Bowel: Yes  Bladder: Yes  Urinary Catheter: None   Colostomy/Ileostomy/Ileal Conduit: No       Date of Last BM: ***  No intake or output data in the 24 hours ending 11/10/22 0708  No intake/output data recorded. Safety Concerns: At Risk for Falls    Impairments/Disabilities:      Right TKA    Nutrition Therapy:  Current Nutrition Therapy:   - Oral Diet:  General    Routes of Feeding: Oral  Liquids: No Restrictions  Daily Fluid Restriction: no  Last Modified Barium Swallow with Video (Video Swallowing Test): not done    Treatments at the Time of Hospital Discharge:   Respiratory Treatments: Incentive spirometer  Oxygen Therapy:  is not on home oxygen therapy.   Ventilator:    - No ventilator support    Rehab Therapies:

## 2022-11-10 NOTE — PROGRESS NOTES
Orthopedic Coordinator Note    Patient s/p right total Knee replacement on 11/10/2022 WITH DR. Oma Hutson    The following appointments are currently scheduled:    Post-op with surgeon 11/23/2022 AAT 0845, PT TO ARRIVE EARLY FOR XRAY. Physical Therapy [facility] WITH [facility] IS AWARE OF DISCHARGE TODAY. Wheeled walker order is not entered  Face to face documentation is ENTERED, BUT PT HAS A WALKER HE WILL BRING.     DVT Prophylaxis: 325MG EC ASA BID      Any questions please contact Ora Brown RN, MSN  [phone]      Electronically signed by: Ora Brown RN on 11/10/2022 at 8:30 AM

## 2022-11-11 VITALS
OXYGEN SATURATION: 96 % | HEIGHT: 76 IN | HEART RATE: 76 BPM | WEIGHT: 289 LBS | RESPIRATION RATE: 16 BRPM | SYSTOLIC BLOOD PRESSURE: 136 MMHG | TEMPERATURE: 98.4 F | DIASTOLIC BLOOD PRESSURE: 78 MMHG | BODY MASS INDEX: 35.19 KG/M2

## 2022-11-11 LAB
GLUCOSE BLD-MCNC: 103 MG/DL (ref 75–110)
GLUCOSE BLD-MCNC: 111 MG/DL (ref 75–110)

## 2022-11-11 PROCEDURE — 97535 SELF CARE MNGMENT TRAINING: CPT

## 2022-11-11 PROCEDURE — 97530 THERAPEUTIC ACTIVITIES: CPT

## 2022-11-11 PROCEDURE — 82947 ASSAY GLUCOSE BLOOD QUANT: CPT

## 2022-11-11 PROCEDURE — 6360000002 HC RX W HCPCS: Performed by: ORTHOPAEDIC SURGERY

## 2022-11-11 PROCEDURE — 6370000000 HC RX 637 (ALT 250 FOR IP): Performed by: ORTHOPAEDIC SURGERY

## 2022-11-11 PROCEDURE — 97116 GAIT TRAINING THERAPY: CPT

## 2022-11-11 PROCEDURE — 2580000003 HC RX 258: Performed by: ORTHOPAEDIC SURGERY

## 2022-11-11 RX ADMIN — ACETAMINOPHEN 650 MG: 325 TABLET, FILM COATED ORAL at 12:05

## 2022-11-11 RX ADMIN — ACETAMINOPHEN 650 MG: 325 TABLET, FILM COATED ORAL at 06:00

## 2022-11-11 RX ADMIN — OXYCODONE 10 MG: 5 TABLET ORAL at 02:45

## 2022-11-11 RX ADMIN — POTASSIUM CHLORIDE 10 MEQ: 1500 TABLET, EXTENDED RELEASE ORAL at 09:04

## 2022-11-11 RX ADMIN — KETOROLAC TROMETHAMINE 30 MG: 30 INJECTION, SOLUTION INTRAMUSCULAR; INTRAVENOUS at 06:00

## 2022-11-11 RX ADMIN — SODIUM CHLORIDE, PRESERVATIVE FREE 10 ML: 5 INJECTION INTRAVENOUS at 09:05

## 2022-11-11 RX ADMIN — HYDROCHLOROTHIAZIDE 12.5 MG: 25 TABLET ORAL at 09:04

## 2022-11-11 RX ADMIN — ROSUVASTATIN CALCIUM 10 MG: 10 TABLET, FILM COATED ORAL at 09:04

## 2022-11-11 RX ADMIN — OXYCODONE 10 MG: 5 TABLET ORAL at 12:05

## 2022-11-11 RX ADMIN — BISACODYL 5 MG: 5 TABLET, COATED ORAL at 09:05

## 2022-11-11 RX ADMIN — ASPIRIN 325 MG: 325 TABLET, COATED ORAL at 09:04

## 2022-11-11 RX ADMIN — METFORMIN HYDROCHLORIDE 500 MG: 500 TABLET, EXTENDED RELEASE ORAL at 09:46

## 2022-11-11 RX ADMIN — LABETALOL HYDROCHLORIDE 200 MG: 200 TABLET, FILM COATED ORAL at 09:05

## 2022-11-11 RX ADMIN — AMLODIPINE BESYLATE 10 MG: 10 TABLET ORAL at 09:05

## 2022-11-11 ASSESSMENT — PAIN DESCRIPTION - DESCRIPTORS
DESCRIPTORS: SHARP
DESCRIPTORS: ACHING;DISCOMFORT

## 2022-11-11 ASSESSMENT — PAIN DESCRIPTION - LOCATION
LOCATION: KNEE
LOCATION: KNEE

## 2022-11-11 ASSESSMENT — PAIN DESCRIPTION - ORIENTATION
ORIENTATION: RIGHT
ORIENTATION: RIGHT

## 2022-11-11 ASSESSMENT — PAIN DESCRIPTION - FREQUENCY: FREQUENCY: CONTINUOUS

## 2022-11-11 ASSESSMENT — PAIN SCALES - GENERAL
PAINLEVEL_OUTOF10: 5
PAINLEVEL_OUTOF10: 7

## 2022-11-11 ASSESSMENT — PAIN DESCRIPTION - ONSET: ONSET: ON-GOING

## 2022-11-11 ASSESSMENT — PAIN - FUNCTIONAL ASSESSMENT: PAIN_FUNCTIONAL_ASSESSMENT: PREVENTS OR INTERFERES SOME ACTIVE ACTIVITIES AND ADLS

## 2022-11-11 ASSESSMENT — PAIN DESCRIPTION - PAIN TYPE: TYPE: SURGICAL PAIN

## 2022-11-11 NOTE — FLOWSHEET NOTE
Patient up to the BR with right knee immobilizer and walker & gait belt with 2 assist, pt voided without difficulty.  Tolerated ambulation well with no buckling of the knee

## 2022-11-11 NOTE — CARE COORDINATION
Social work: faxed sage to [employer] which was pt's wish per RN. Pt is discharging now.  Marylu farnsworth

## 2022-11-11 NOTE — PROGRESS NOTES
Occupational Therapy  Facility/Department: [facility]  Rehabilitation Occupational Therapy Daily Treatment Note    Date: 22  Patient Name: Saji Jamil       Room:   MRN: [id number]  Account: [de-identified]   : 1970  (45 y.o.) Gender: male       Past Medical History:  has a past medical history of Arthritis, Atrial fibrillation ([facility] [address] COVID-19, Diabetes mellitus ([facility] [address] Hyperlipidemia, Hypertension, Neuropathy, Skin cancer, and Sleep apnea. Past Surgical History:   has a past surgical history that includes transesophageal echocardiogram (2019); Cardioversion (2019); Mohs surgery; Cardioversion (2019); ablation of dysrhythmic focus (2019); Colonoscopy; skin biopsy; Vein Surgery (Left); Knee arthroscopy (Bilateral); Septoplasty (Bilateral, 2021); Insertable Cardiac Monitor (); and Total knee arthroplasty (Right, 11/10/2022). Restrictions  Restrictions/Precautions: General Precautions; Fall Risk;Weight Bearing  Other position/activity restrictions: B thigh MARY hose, LUE IV, 2L O2 NC, continuous pulse ox  Right Lower Extremity Weight Bearing: Weight Bearing As Tolerated  Required Braces or Orthoses?: No    Subjective  Subjective: \"I am ready to go home! \"  Restrictions/Precautions: General Precautions; Fall Risk;Weight Bearing   Objective     Cognition  Overall Cognitive Status: WFL  Safety Judgement: Decreased awareness of need for safety  Orientation  Overall Orientation Status: Within Functional Limits  Orientation Level: Oriented X4         ADL  Feeding  Assistance Level: Independent  Grooming/Oral Hygiene  Assistance Level: Independent; Set-up  Upper Extremity Bathing  Assistance Level: Independent  Skilled Clinical Factors: shower completed  Lower Extremity Bathing  Assistance Level: Independent  Skilled Clinical Factors: Completed in shower  Upper Extremity Dressing  Assistance Level: Independent; Set-up  Skilled Clinical Factors: While sitting up in chair  Lower Extremity Dressing  Assistance Level: Moderate assistance  Skilled Clinical Factors: Mod assist to malcolm clothing over feet, dependent for MARY hose  Putting On/Taking Off Footwear  Assistance Level: Maximum assistance  Skilled Clinical Factors: Max assist for shoes  Toileting  Skilled Clinical Factors: Not tested  Tub/Shower Transfers  Type: Shower  Transfer From: [employer]  Transfer To: Shower chair with back  Additional Factors: Increased time to complete;Cues for hand placement;Set-up; Head of bed flat  Skilled Clinical Factors: Initial stand min/mod assist x2 with walker. Functional Mobility  Device: Rolling walker  Activity: To/From bathroom; To/From therapy gym  Assistance Level: Minimal assistance; Requires x 2 assistance; Moderate assistance  Skilled Clinical Factors: Multiple ADL transfers including stairs  Bed Mobility  Overall Assistance Level: Independent  Bridging  Assistance Level: Independent  Roll Left  Assistance Level: Independent  Roll Right  Assistance Level: Independent  Supine to Sit  Assistance Level: Stand by assist  Skilled Clinical Factors: With assist with positioning of right LE  Scooting  Assistance Level: Independent  Transfers  Surface: From bed; To chair with arms  Additional Factors: Set-up; Verbal cues; Increased time to complete; With handrails  Device: Walker  Sit to Stand  Assistance Level: Minimal assistance; Moderate assistance; Requires x 2 assistance  Skilled Clinical Factors: Initial stand min/mod x2. Stand to Sit  Assistance Level: Minimal assistance  Skilled Clinical Factors: Min asssist stand to sit up in recliner         Assessment  Assessment  Assessment: Pt. completed bed mobility with independence/SBA with movement of right knee. Pt. complete inital stand from EOB with min/mod assist x2. Pt. completed showering task with set up and completed hygiene independently. Pt. completed multiple ADL transfers including steps with min/mod assist x2 to insure safety.  Wife present and aware of skills. Skilled OT warranted to promote I/safety to return pt to prior living arrangement with assist as needed. Activity Tolerance: Patient tolerated treatment well;Patient limited by fatigue;Patient limited by pain  Discharge Recommendations: Patient would benefit from continued therapy after discharge  OT Equipment Recommendations  Equipment Needed: Yes  ADL Assistive Devices: Long-handled Shoe Horn;Long-handled Sponge;Reacher;Sock-Aid Hard  Safety Devices  Safety Devices in place: Not Applicable  Restraints  Initially in place: No    Patient Education  Education  Education Given To: Patient; Family  Education Provided: Role of Therapy;Plan of Care;Transfer Training; Safety; Energy Conservation;Equipment;ADL Function  Education Provided Comments: Pt. educated on ADL safety with use of RW and AE. Pt. wife present and both appeared to have clear understanding of AE and proper use. Education Method: Demonstration;Verbal  Barriers to Learning: None  Education Outcome: Verbalized understanding;Continued education needed    Plan  Occupational Therapy Plan  Times Per Week: 5-6x/wk 1-2x/day as lauren  Times Per Day: Once a day  Current Treatment Recommendations: Strengthening;Balance training;Functional mobility training; Endurance training; Safety education & training;Equipment evaluation, education, & procurement;Self-Care / ADL; Home management training;Patient/Caregiver education & training;Pain management  Additional Comments: Cont with stated POC    Goals  Patient Goals   Patient goals : To walk more and have less pain  Short Term Goals  Time Frame for Short Term Goals: By discharge, pt to demo  Short Term Goal 1: ADL transfers and functional mobility to SBA with use of AD as needed. Short Term Goal 2: bed mobility to SBA with use of bed rails as needed. Short Term Goal 3: UB ADLs to Set up and LB ADLs to Min A with use of AD/AE as needed.   Short Term Goal 4: toileting to SBA with use of AD/grab bars as needed. Short Term Goal 5: I with fall prevention education, EC/WS tech, recommendations for AE, safe car transfers and MARY hose wear/purpose with use of handouts as needed. AM-PAC Score        AM-PAC Inpatient Daily Activity Raw Score: 21 (11/11/22 1114)  AM-PAC Inpatient ADL T-Scale Score : 44.27 (11/11/22 1114)  ADL Inpatient CMS 0-100% Score: 32.79 (11/11/22 1114)  ADL Inpatient CMS G-Code Modifier : CJ (11/11/22 1114)      Therapy Time   Individual Concurrent Group Co-treatment   Time In 0924         Time Out 2576         Minutes 70             Co-treatment with PT warranted secondary to decreased safety and independence requiring 2 skilled therapy professionals to address individual discipline's goals. OT addressing preparation for ADL transfer, sitting balance for increased ADL performance, sitting/activity tolerance, functional reaching, environmental safety/scanning, fall prevention, functional mobility for ADL transfers, ability to sequence and follow directions, bed mobility tech, and functional UE strength.      Xu Chavez YOGESH

## 2022-11-11 NOTE — PROGRESS NOTES
Physical Therapy  Facility/Department: [facility]  Rehabilitation Physical Therapy Treatment Note    NAME: Prem Kearney  : 1970 (45 y.o.)  MRN: [id number]  CODE STATUS: Full Code    Date of Service: 22       Restrictions:  Restrictions/Precautions: General Precautions; Fall Risk;Weight Bearing  Lower Extremity Weight Bearing Restrictions  Right Lower Extremity Weight Bearing: Weight Bearing As Tolerated  Position Activity Restriction  Other position/activity restrictions: B thigh MARY hose, LUE IV, 2L O2 NC, continuous pulse ox     SUBJECTIVE  Subjective  Subjective: pt in bed upon arrival agreeable to PT        Post Treatment Pain Screening         OBJECTIVE  Cognition  Overall Cognitive Status: WFL  Safety Judgement: Decreased awareness of need for safety  Orientation  Overall Orientation Status: Within Functional Limits  Orientation Level: Oriented X4    Functional Mobility  Device: Rolling walker  Activity: To/From bathroom; To/From therapy gym  Assistance Level: Minimal assistance; Requires x 2 assistance; Moderate assistance  Skilled Clinical Factors: Multiple ADL transfers including stairs  Bed Mobility  Overall Assistance Level: Independent  Bridging  Assistance Level: Independent  Roll Left  Assistance Level: Independent  Roll Right  Assistance Level: Independent  Supine to Sit  Assistance Level: Stand by assist  Skilled Clinical Factors: With assist with positioning of right LE  Scooting  Assistance Level: Independent  Transfers  Surface: From bed; To chair with arms  Additional Factors: Set-up; Verbal cues; Increased time to complete; With handrails  Device: Walker  Sit to Stand  Assistance Level: Minimal assistance; Moderate assistance; Requires x 2 assistance  Skilled Clinical Factors: Initial stand min/mod x2.   Stand to Sit  Assistance Level: Minimal assistance  Skilled Clinical Factors: Min asssist stand to sit up in recliner     Environmental Mobility  Ambulation  Surface: Level surface  Device: Rolling walker  Distance: 20 ft x 2,  150 ft. Activity: Within Room; Within Unit  Additional Factors: Verbal cues; Hand placement cues  Assistance Level: Minimal assistance  Gait Deviations: Decreased step length bilateral;Decreased weight shift left;Decreased step length right  Stairs  Stair Height: 6'';8''  Device: Bilateral handrails  Number of Stairs: 10 stairs  Additional Factors: Verbal cues  Assistance Level: Contact guard assist         Skilled Clinical Factors: shower completed  Lower Extremity Bathing  Assistance Level: Independent  Skilled Clinical Factors: Completed in shower  Upper Extremity Dressing  Assistance Level: Independent; Set-up  Skilled Clinical Factors: While sitting up in chair  Lower Extremity Dressing  Assistance Level: Moderate assistance  Skilled Clinical Factors: Mod assist to malcolm clothing over feet, dependent for MARY hose  Putting On/Taking Off Footwear  Assistance Level: Maximum assistance  Skilled Clinical Factors: Max assist for shoes  Toileting  Skilled Clinical Factors: Not tested  Tub/Shower Transfers  Type: Shower  Transfer From: [facility]  Transfer To: Shower chair with back  Additional Factors: Increased time to complete;Cues for hand placement;Set-up; Head of bed flat  Skilled Clinical Factors: Initial stand min/mod assist x2 with walker. PT Exercises  Exercise Treatment: ankle pumps, quad sets, glute sets, heelslides PROM & AROM      ASSESSMENT/PROGRESS TOWARDS GOALS       Assessment  Pt. completed bed mobility with independence/SBA with movement of right knee. Pt. complete inital stand from EOB with min/mod assist x2. Pt. completed showering task with set up and completed hygiene independently. Pt. completed multiple  transfers including steps with min/mod assist x2 to insure safety. Wife present and aware of skills. Skilled PT warranted to promote I/safety to return pt to prior living arrangement with assist as needed.     Activity Tolerance: Patient tolerated treatment well;Patient limited by fatigue;Patient limited by pain  Discharge Recommendations: Patient would benefit from continued therapy after discharge    Goals  Patient Goals   Patient Goals : Less pain, to walk  Short Term Goals  Time Frame for Short Term Goals: 6 visits  Short Term Goal 1: Pt to demonstrate bed mobility independently  Short Term Goal 2: Pt to perform STS transfers w/ RW [place]  Short Term Goal 3: Pt to ambulate at least 50ft w/ RW [place]  Short Term Goal 4: Pt to ascend/descend 10 stairs w/ handrails SBA  Short Term Goal 5: Pt to be indep w/ TKA HEP    PLAN OF CARE/SAFETY  Physcial Therapy Plan  General Plan: 2 times a day 7 days a week  Current Treatment Recommendations: Strengthening;ROM;Balance training;Functional mobility training;Transfer training;Neuromuscular re-education;Stair training;Gait training; Endurance training;Pain management;Home exercise program;Safety education & training;Patient/Caregiver education & training;Equipment evaluation, education, & procurement; Therapeutic activities  Safety Devices  Type of Devices: Bed alarm in place;Call light within reach; Left in bed;Gait belt;Nurse notified    EDUCATION  Education  Education Given To: Patient; Family  Education Provided: Role of Therapy;Plan of Care; Mobility Training;Home Exercise Program;Transfer Training; Safety  Education Method: Verbal  Barriers to Learning: Cognition    AM-PAC Score    AM-PAC Inpatient Mobility Raw Score : 18  AM-PAC Inpatient T-Scale Score : 43.63  Mobility Inpatient CMS 0-100% Score: 46.58  Mobility Inpatient CMS G-Code Modifier:CK        Therapy Time   Individual Concurrent Group Co-treatment   Time In 0924         Time Out 1035         Minutes 71                   ANTWAN DOUGHERTY PTA, 11/11/22 at 1:12 PM

## 2022-11-11 NOTE — PROGRESS NOTES
Pt discharged to home in stable condition with belongings  Discharge instructions given  Prescriptions already at home per wife. Pt denies having any further questions at this time  Locked up home medication(s)/personal items given to patient at discharge  Patient/family state they have everything they were admitted with.

## 2022-11-11 NOTE — PROGRESS NOTES
Orthopaedic Progress Note      SUBJECTIVE   Mr. Ludwin Lassiter is post op day # 1   denies pain. Patient notes that he is doing well this morning. Some the numbness and difficulties he was having with ambulation yesterday are improved      OBJECTIVE      Physical    VITALS:  /79   Pulse 79   Temp 97.6 °F (36.4 °C) (Oral)   Resp 12   Ht 6' 4\" (1.93 m)   Wt 289 lb (131.1 kg)   SpO2 97%   BMI 35.18 kg/m²   INTAKE/OUTPUT:    Intake/Output Summary (Last 24 hours) at 2022 0701  Last data filed at 2022 0251  Gross per 24 hour   Intake 4725.99 ml   Output 100 ml   Net 4625.99 ml     TEMPERATURE:  Current - Temp: 97.6 °F (36.4 °C); Max - Temp  Av.7 °F (36.5 °C)  Min: 96.8 °F (36 °C)  Max: 98.7 °F (37.1 °C)  RESPIRATIONS RANGE: Resp  Av  Min: 10  Max: 16  PULSE RANGE: Pulse  Av.5  Min: 72  Max: 81  BLOOD PRESSURE RANGE:  Systolic (19MWF), ZSM:327 , Min:126 , PSD:293  ; Diastolic (57QUC), IZL:39, Min:72, Max:87   PULSE OXIMETRY RANGE: SpO2  Av.3 %  Min: 92 %  Max: 99 %  I/O last 3 completed shifts:   In: .4 [I.V.:.4; IV Piggyback:100]  Out: 100 [Blood:100]      He is awake and oriented x3  His dressings are clean dry and intact  There are no signs of DVT  He is neurovascular intact the entire right lower extremity      Data  CBC:   Lab Results   Component Value Date/Time    WBC 6.6 10/20/2022 08:33 AM    HGB 14.9 10/20/2022 08:33 AM     10/20/2022 08:33 AM     BMP:    Lab Results   Component Value Date/Time     10/20/2022 08:33 AM    K 4.1 10/20/2022 08:33 AM     10/20/2022 08:33 AM    CO2 27 10/20/2022 08:33 AM    BUN 21 10/20/2022 08:33 AM    CREATININE 1.33 10/20/2022 08:33 AM    CALCIUM 9.6 10/20/2022 08:33 AM    GLUCOSE 119 10/20/2022 08:33 AM     Uric Acid:  No components found for: URIC  PT/INR:    Lab Results   Component Value Date/Time    PROTIME 11.3 2019 07:25 AM    INR 1.1 2019 07:25 AM     Troponin:  No results found for: TROPONINI  Urine Culture:  No components found for: AALIYAH      Current Inpatient Medications    Current Facility-Administered Medications: metFORMIN (GLUCOPHAGE-XR) extended release tablet 500 mg, 500 mg, Oral, Daily with breakfast  rosuvastatin (CRESTOR) tablet 10 mg, 10 mg, Oral, Daily  labetalol (NORMODYNE) tablet 200 mg, 200 mg, Oral, BID  amLODIPine (NORVASC) tablet 10 mg, 10 mg, Oral, Daily  hydroCHLOROthiazide (HYDRODIURIL) tablet 12.5 mg, 12.5 mg, Oral, Daily  potassium chloride (KLOR-CON M) extended release tablet 10 mEq, 10 mEq, Oral, Daily  sodium chloride flush 0.9 % injection 5-40 mL, 5-40 mL, IntraVENous, 2 times per day  sodium chloride flush 0.9 % injection 5-40 mL, 5-40 mL, IntraVENous, PRN  0.9 % sodium chloride infusion, , IntraVENous, PRN  acetaminophen (TYLENOL) tablet 650 mg, 650 mg, Oral, Q6H  ondansetron (ZOFRAN-ODT) disintegrating tablet 4 mg, 4 mg, Oral, Q8H PRN **OR** ondansetron (ZOFRAN) injection 4 mg, 4 mg, IntraVENous, Q6H PRN  0.9 % sodium chloride infusion, , IntraVENous, Continuous  ketorolac (TORADOL) injection 30 mg, 30 mg, IntraVENous, Q6H  oxyCODONE (ROXICODONE) immediate release tablet 5 mg, 5 mg, Oral, Q4H PRN **OR** oxyCODONE (ROXICODONE) immediate release tablet 10 mg, 10 mg, Oral, Q4H PRN  HYDROmorphone (DILAUDID) injection 0.25 mg, 0.25 mg, IntraVENous, Q3H PRN **OR** HYDROmorphone (DILAUDID) injection 0.5 mg, 0.5 mg, IntraVENous, Q3H PRN  hydrOXYzine HCl (ATARAX) tablet 10 mg, 10 mg, Oral, Q8H PRN  bisacodyl (DULCOLAX) EC tablet 5 mg, 5 mg, Oral, Daily  senna (SENOKOT) tablet 8.6 mg, 1 tablet, Oral, Daily PRN  aspirin EC tablet 325 mg, 325 mg, Oral, BID        PLAN    Remove his dressing today and place thigh-high MARY hose  Work with physical therapy this morning  Discharge home today    Radha Padilla MD

## 2022-11-11 NOTE — FLOWSHEET NOTE
11/10/22 0013   Mobility   Activity To Bathroom   Level of Assistance Modified independent, requires aide device or extra time   Assistive Device Front wheel walker;Gait belt   Ambulation Response Tolerated well

## 2022-11-11 NOTE — PLAN OF CARE
Problem: Chronic Conditions and Co-morbidities  Goal: Patient's chronic conditions and co-morbidity symptoms are monitored and maintained or improved  11/11/2022 1231 by Tami Espitia RN  Outcome: Completed  Flowsheets (Taken 11/11/2022 0904)  Care Plan - Patient's Chronic Conditions and Co-Morbidity Symptoms are Monitored and Maintained or Improved: Monitor and assess patient's chronic conditions and comorbid symptoms for stability, deterioration, or improvement  11/11/2022 0106 by Yasmin Pitt RN  Outcome: Progressing     Problem: Discharge Planning  Goal: Discharge to home or other facility with appropriate resources  11/11/2022 1231 by Tami Espitia RN  Outcome: Completed  Flowsheets (Taken 11/11/2022 0904)  Discharge to home or other facility with appropriate resources: Identify barriers to discharge with patient and caregiver  11/11/2022 0106 by Yasmin Pitt RN  Outcome: Progressing     Problem: Pain  Goal: Verbalizes/displays adequate comfort level or baseline comfort level  11/11/2022 1231 by Tami Espitia RN  Outcome: Completed  11/11/2022 0106 by Yasmin Pitt RN  Outcome: Progressing     Problem: Skin/Tissue Integrity  Goal: Absence of new skin breakdown  Description: 1. Monitor for areas of redness and/or skin breakdown  2. Assess vascular access sites hourly  3. Every 4-6 hours minimum:  Change oxygen saturation probe site  4. Every 4-6 hours:  If on nasal continuous positive airway pressure, respiratory therapy assess nares and determine need for appliance change or resting period.   11/11/2022 1231 by Tami Espitia RN  Outcome: Completed  11/11/2022 0106 by Yasmin Pitt RN  Outcome: Progressing     Problem: Safety - Adult  Goal: Free from fall injury  11/11/2022 1231 by Tami Espitia RN  Outcome: Completed  11/11/2022 0106 by Yasmin Pitt RN  Outcome: Progressing  Flowsheets (Taken 11/10/2022 2300)  Free From Fall Injury:   Instruct family/caregiver on patient safety   Based on caregiver fall risk screen, instruct family/caregiver to ask for assistance with transferring infant if caregiver noted to have fall risk factors     Problem: ABCDS Injury Assessment  Goal: Absence of physical injury  11/11/2022 1231 by Mora Carlos RN  Outcome: Completed  11/11/2022 0106 by Andie Renee RN  Outcome: Progressing  Flowsheets (Taken 11/10/2022 2300)  Absence of Physical Injury: Implement safety measures based on patient assessment     Problem: Neurosensory - Adult  Goal: Achieves stable or improved neurological status  11/11/2022 1231 by Mora Carlos RN  Outcome: Completed  Flowsheets (Taken 11/11/2022 0904)  Achieves stable or improved neurological status: Assess for and report changes in neurological status  11/11/2022 0106 by Andie Renee RN  Outcome: Progressing  Flowsheets (Taken 11/10/2022 2000)  Achieves stable or improved neurological status: Assess for and report changes in neurological status     Problem: Musculoskeletal - Adult  Goal: Return mobility to safest level of function  11/11/2022 1231 by Mora Carlos RN  Outcome: Completed  Flowsheets (Taken 11/11/2022 0904)  Return Mobility to Safest Level of Function: Assess patient stability and activity tolerance for standing, transferring and ambulating with or without assistive devices  11/11/2022 0106 by Andie Renee RN  Outcome: Progressing  Flowsheets (Taken 11/10/2022 2000)  Return Mobility to Safest Level of Function:   Assess patient stability and activity tolerance for standing, transferring and ambulating with or without assistive devices   Assist with transfers and ambulation using safe patient handling equipment as needed   Ensure adequate protection for wounds/incisions during mobilization   Obtain physical therapy/occupational therapy consults as needed   Instruct patient/family in ordered activity level     Problem: Skin/Tissue Integrity - Adult  Goal: Skin integrity remains intact  Recent Flowsheet Documentation  Taken 11/11/2022 0904 by Jose D Childs Roe Cotton RN  Skin Integrity Remains Intact: Monitor for areas of redness and/or skin breakdown  Taken 11/10/2022 2300 by Dakotah Lopez RN  Skin Integrity Remains Intact: Monitor for areas of redness and/or skin breakdown

## 2022-11-11 NOTE — PLAN OF CARE
Problem: Chronic Conditions and Co-morbidities  Goal: Patient's chronic conditions and co-morbidity symptoms are monitored and maintained or improved  11/11/2022 0106 by Olivia Garcia RN  Outcome: Progressing     Problem: Discharge Planning  Goal: Discharge to home or other facility with appropriate resources  11/11/2022 0106 by Olivia Garcia RN  Outcome: Progressing     Problem: Pain  Goal: Verbalizes/displays adequate comfort level or baseline comfort level  11/11/2022 0106 by Olivia Garcia RN  Outcome: Progressing     Problem: Skin/Tissue Integrity  Goal: Absence of new skin breakdown  Description: 1. Monitor for areas of redness and/or skin breakdown  2. Assess vascular access sites hourly  3. Every 4-6 hours minimum:  Change oxygen saturation probe site  4. Every 4-6 hours:  If on nasal continuous positive airway pressure, respiratory therapy assess nares and determine need for appliance change or resting period.   11/11/2022 0106 by Olivia Garcia RN  Outcome: Progressing     Problem: Safety - Adult  Goal: Free from fall injury  11/11/2022 0106 by Olivia Garcia RN  Outcome: Progressing  Flowsheets (Taken 11/10/2022 2300)  Free From Fall Injury:   Instruct family/caregiver on patient safety   Based on caregiver fall risk screen, instruct family/caregiver to ask for assistance with transferring infant if caregiver noted to have fall risk factors     Problem: ABCDS Injury Assessment  Goal: Absence of physical injury  11/11/2022 0106 by Olivia Garcia RN  Outcome: Progressing  Flowsheets (Taken 11/10/2022 2300)  Absence of Physical Injury: Implement safety measures based on patient assessment     Problem: Neurosensory - Adult  Goal: Achieves stable or improved neurological status  11/11/2022 0106 by Olivia Garcia RN  Outcome: Progressing  Flowsheets (Taken 11/10/2022 2000)  Achieves stable or improved neurological status: Assess for and report changes in neurological status     Problem: Musculoskeletal - Adult  Goal: Return mobility to safest level of function  11/11/2022 0106 by Julian Barrios RN  Outcome: Progressing  Flowsheets (Taken 11/10/2022 2000)  Return Mobility to Safest Level of Function:   Assess patient stability and activity tolerance for standing, transferring and ambulating with or without assistive devices   Assist with transfers and ambulation using safe patient handling equipment as needed   Ensure adequate protection for wounds/incisions during mobilization   Obtain physical therapy/occupational therapy consults as needed   Instruct patient/family in ordered activity level

## 2022-11-30 ENCOUNTER — HOSPITAL ENCOUNTER (OUTPATIENT)
Dept: PHYSICAL THERAPY | Facility: CLINIC | Age: 52
Setting detail: THERAPIES SERIES
Discharge: HOME | End: 2022-11-30
Payer: COMMERCIAL

## 2022-11-30 PROCEDURE — 97110 THERAPEUTIC EXERCISES: CPT

## 2022-11-30 PROCEDURE — 97016 VASOPNEUMATIC DEVICE THERAPY: CPT

## 2022-11-30 PROCEDURE — 97161 PT EVAL LOW COMPLEX 20 MIN: CPT

## 2022-11-30 NOTE — CONSULTS
COVID-19 [U07.1] 11/2021 Mild per pt., no treatment     Diabetes mellitus ([facility] [address] [E11.9]  Type 2, does not check sugars at home, on Metformin, managed by PCP (10/20/22)     Neuropathy [G62.9]  Tingling in both feet at night per pt. Past Surgical History         Laterality Date Comments   TRANSESOPHAGEAL ECHOCARDIOGRAM [facility]  06/07/2019    Cardioversion [STB4770]  06/07/2019    Mohs surgery [FUF983]   1st stage head and neck   Cardioversion [SFS2620]  07/01/2019 by Dr. Oli Flores.  1 shock @ 200 converted to NSR   ABLATION OF DYSRHYTHMIC FOCUS [MOB176]  07/2019    Colonoscopy [BPK926]      SKIN BIOPSY [SHX1]      Vein Surgery [SHX48] Left     Knee arthroscopy [ELO131] Bilateral     Septoplasty [IFV5451] Bilateral 11/29/2021 SEPTOPLASTY BILATERAL TURBINATE REDUCTION performed by Lilo Guy MD at [address] [facility] Cardiac Monitor [[id number]]  2019 Loop recorder in place (10/20/22)   Total knee arthroplasty [ZLB838] Right 11/10/2022 RIGHT KNEE TOTAL ARTHROPLASTY - Dolan Libman performed by Vivek Ohara MD at [address] [facility]                  Comorbidities:   [x] Obesity [] Dialysis  [] N/A   [] Asthma/COPD [] Dementia [x] Other: Diabetes    [] Stroke [x] Sleep apnea [] Other:   [] Vascular disease [] Rheumatic disease [] Other:     Tests:    [] X-ray:  [] MRI:    [] Other:    Medications: [x] Refer to full medical record [] None [] Other:  Allergies:      [x] Refer to full medical record  [] None [] Other:    Function:    Marital Status  Patient lives with  Lives with wife    Home type  Equipment 2 story house   Stairs from outside 2 steps with side bar to hold onto    Stairs inside 15 steps with railing, has been using crutches   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 [employer]    Job status Off currently due to surgery   Returning 1/3/23   Work Activities/duties  Bending, lifting, walking, kneeling   Recreational Activities General workouts, walking, biking       ADL/IADL [x] Previously independent with all [] Currently independent with all Who currently assists the patient with task    [] Previously independent with all except: [x] Currently independent with all except:    Bathing  [] Assist [] Assist    Dress/grooming [] Assist [] Assist    Transfer/mobility [] Assist [] Assist    Feeding [] Assist [] Assist    Toileting [] Assist [] Assist    Driving [] Assist [x] Assist Wife    Housekeeping [] Assist [x] Assist Wife   Grocery shop/meal prep [] Assist [x] Assist Wife         Gait Prior level of function Current level of function    [x] Independent  [] Assist [] Independent  [x] Assist   Device: [x] Independent [] Independent    [] Straight Cane [] Quad cane [] Straight Cane [] Quad cane    [] Standard walker [] Rolling walker   [] 4 wheeled walker [] Standard walker [] Rolling walker   [] 4 wheeled walker   Crutches     [] Wheelchair [] Wheelchair       Pain present?  Yes    Location R knee    Pain Rating currently 3/10    Pain at worse 5/10    Pain at best 2/10    Description of pain Discomfort due to swelling    Altered Sensation Numbness to the lateral aspect of the R knee    What makes it worse Inactivity    What makes it better Ice application 2-7H/YRO, stretching   Symptom progression Gradually improving   Sleep Intact            Objective:    ROM  ° A/P STRENGTH    Left Right Left Right   Hip Flex WFL WFL 5 5   Ext       ER       IR       ABD   4 4   ADD       Knee Flex 121 96 5 4   Ext Lacking 5 Lacking 10 5 3+   Ankle DF 10 10 5 5   Poor quadriceps contraction in long sitting     OBSERVATION No Deficit Deficit Not Tested Comments   Posture       Forward Head [] [x] []    Rounded Shoulders [] [x] []    Kyphosis [x] [] []    Slumped Sitting [] [x] []    Palpation [] [x] [] Mild pain to palpation of the R knee    Sensation [] [x] [] Decreased sensation to the lateral aspect of the R knee   Edema [] [x] [] R (cm)  Suprapatellar: 46  Midpatellar: 45  Infrapatellar: 43   Neurological [x] [] [] Patellar Mobility [] [] [x]    Patellar Orientation [] [] [x]    Gait [] [x] [] Analysis: Decreased R stance phase, using bilateral axillary crutches        FUNCTION Normal Difficult Unable   Sitting [x] [] []   Standing [] [x] []   Ambulation [] [x] []   Groom/Dress [] [x] []   Lift/Carry [] [x] []   Stairs [] [x] []   Bending [] [x] []   Squat [] [] [x]   Kneel [] [] [x]     BALANCE/PROPRIOCEPTION              [x] Not tested   Single leg stance       R                     L                                PAIN   Eyes open                             Sec. Sec                  . []    Eyes closed                          Sec. Sec                  . []          Functional Test: KOOS  Score: 78% functionally impaired     Comments:    Assessment:  46 y/o female presents s/p R TKA on 11/10/22. Therefore, patient is approx 3 weeks post-op. Ambulates into clinic with bilateral axillary crutches, too short for the patient. Educated patient on gait with a single crutch to increased RLE weight bearing. R knee ROM measuring 10-96 degrees this date. Discussed importance of knee extension ROM for mobility and long sitting quadriceps sets due to continued quad weakness. Encouraged patient to increase exercise completion at home to 3-4x/day, especially ROM exercises. Discussed positioning to encourage knee extension. Applied vasocompression at end of session to decrease edema. Patient would benefit from skilled physical therapy services in order to: improve R knee ROM and strength, improve gait mechanics without AD, and decrease pain to ease ADL's and improve quality of life       Problems:    [x] ? Pain: 2-5/10 R knee pain   [x] ? ROM: 10-96 degrees AROM   [x] ? Strength: decreased R quad strength   [x] ?  Function: 78% impairment on KOOS  [] Other:         Goals  MET NOT MET ON-  GOING  Details   Date Addressed:        STG: To be met in 10 treatments           1. ? Pain: Decrease R knee pain levels to 2/10 with ADLs []  []  []      2. ? ROM: Increase R knee AROM to at least 0-120 deg to reduce difficulty with ADLs []  []  []      3. ? Strength: Demonstrate a SLR without quad lag to ease functional limitations and mobility  []  []  []     4. Independent with Home Exercise Programs []  []  []     5. Gait with single crutch or cane with equal LE weight bearing and minimal antalgia  []  []  []      []  []  []     Date Addressed:        LTG: To be met in 20 treatments       1. Improve score on assessment tool KOOS from 78% impairment to less than 40% impairment  []  []  []     2. Reduce pain levels to 0/10 or less with ADLs []  []  []     3. Gait without AD with equal LE weight bearing and good heel to toe transition on the R  []  []  []                           Patient goals: pain relief, walk normal    Rehab Potential:  [x] Good  [] Fair  [] Poor   Suggested Professional Referral:  [x] No  [] Yes:  Barriers to Goal Achievement:  [x] No  [] Yes:  Domestic Concerns:  [x] No  [] Yes:    Pt. Education:  [x] Plans/Goals, Risks/Benefits discussed  [x] Home exercise program    Method of Education: [x] Verbal  [x] Demo  [x] Written  NO printed HEP this date. Reviewed exercises patient has been completing with home health and provided cueing on quad setting as well as heel slides. Comprehension of Education:  [x] Verbalizes understanding. [x] Demonstrates understanding. [x] Needs Review. [] Demonstrates/verbalizes understanding of HEP/Ed previously given.     Treatment Plan:  [x] Therapeutic Exercise   56762  [] Iontophoresis: 4 mg/mL Dexamethasone Sodium Phosphate  mAmin  98769   [x] Therapeutic Activity  03717 [x] Vasopneumatic cold with compression  05072    [x] Gait Training   55069 [] Ultrasound   21705   [x] Neuromuscular Re-education  96587 [] Electrical Stimulation Unattended  09558   [x] Manual Therapy  23253 [] Electrical Stimulation Attended  36282   [x] Instruction in HEP  [] Lumbar/Cervical Traction  [facility]   [] Aquatic Therapy   [id number] [] Cold/hotpack    [] Massage   [id number]      [] Dry Needling, 1 or 2 muscles  76587   [] Biofeedback, first 15 minutes   19836  [] Biofeedback, additional 15 minutes   03487 [] Dry Needling, 3 or more muscles  26716     []  Medication allergies reviewed for use of    Dexamethasone Sodium Phosphate 4mg/ml     with iontophoresis treatments. Pt is not allergic.     Frequency:  2-3 x/week for 20  visits      Todays Treatment:  Precautions: WBAT   Exercises:  Exercise    R TKA   DOS 11/10/22 Reps/ Time Weight/ Level Comments         Bike/NuStep             Calf Stretch       Hamstring Stretch             Quad Set  10x5\"     SLR  Next      Heel Slides  x10     Clamshells       Bridges       Sidelying hip abduction             4-way hip Tband  Next      Tband TKE       Total Gym Squats                 Other: vasocompression x15 min 24 degrees moderate compression       Specific Instructions for next treatment: focus on extension ROM and quad strengthening, vasocompression as needed       Evaluation Complexity:  History (Personal factors, comorbidities) [] 0 [x] 1-2 [] 3+   Exam (limitations, restrictions) [] 1-2 [] 3 [x] 4+   Clinical presentation (progression) [x] Stable [] Evolving  [] Unstable   Decision Making [x] Low [] Moderate [] High    [x] Low Complexity [] Moderate Complexity [] High Complexity       Treatment Charges: Mins Units   [x] Evaluation       [x]  Low       []  Moderate       []  High 15 1   []  Modalities     [x]  Ther Exercise 10 1   []  Manual Therapy     []  Ther Activities     []  Aquatics     [x]  Vasocompression 15 1   []  Other       TOTAL TREATMENT TIME: 40 min    Time in:0910   Time [id number]    Electronically signed by: Madi Montes PT        Physician Signature:________________________________Date:__________________  By signing above or cosigning this note, I have reviewed this plan of care and certify a need for medically necessary rehabilitation services.      *PLEASE SIGN ABOVE AND FAX BACK ALL PAGES*

## 2022-12-05 ENCOUNTER — HOSPITAL ENCOUNTER (OUTPATIENT)
Dept: PHYSICAL THERAPY | Facility: CLINIC | Age: 52
Setting detail: THERAPIES SERIES
Discharge: HOME | End: 2022-12-05
Payer: COMMERCIAL

## 2022-12-05 PROCEDURE — 97110 THERAPEUTIC EXERCISES: CPT

## 2022-12-05 PROCEDURE — 97016 VASOPNEUMATIC DEVICE THERAPY: CPT

## 2022-12-05 NOTE — FLOWSHEET NOTE
[x] THE [facility] &  Therapy  [facility]   [place]: [phone]  F: [phone]      Physical Therapy Daily Treatment Note    Date:  2022  Patient Name:  Daiana Reid    :  1970  MRN: [id number]  Physician: Dr. Roach Oh: BCBS/[place] ( vs, Michael yr; $0Copay;Ded$1000met; Coins10%;OOP$2000/$101.06 remain)  Medical Diagnosis: Status post total right knee replacement           Rehab Codes: M62.81, R26.89, R60.0, M25.661, M79.604  Onset date: 11/10/22                         Next Dr's appt. : 22    Visit# / total visits:      Cancels/No Shows:     Subjective:    Pain:  [] Yes  [x] No Location: RLE  Pain Rating: (0-10 scale) 0/10  Pain altered Tx:  [x] No  [] Yes  Action:  Comments: Patient arrived noting no pain just numbness, patient is now ambulating with single crutch. Objective:  Precautions: WBAT   Exercises:  Exercise     R TKA   DOS 11/10/22 Reps/ Time Weight/ Level Comments             NuStep   10'                 Calf Stretch   3x30\"       Hamstring Stretch   3x30\"                 Quad Set   10x5\"       SLR   2x10    Minimal quad lag noted   Heel Slides   x10       Clamshells   2x10       Bridges   2x10       Sidelying hip abduction   2x10                 4-way hip Tband   x15  Green     Tband TKE   10x10\"  Blue     Total Gym Squats   2x10  L20     Step ups  2x10  6\"                          ROM: 8-105       Other: vasocompression x15 min 34 degrees moderate compression      Treatment Charges: Mins Units   []  Modalities     [x]  Ther Exercise 35 2   []  Manual Therapy     []  Ther Activities     []  Aquatics     [x]  Vasocompression 15 1   []  Other     Total Treatment time 50 3       Assessment: [x] Progressing toward goals. Progressed strength and weightbearing program this date.  Patient notes soreness and fatigue with SL weightbearing with no significant increase in pain or soreness. Slight improvement noted in ROM this date. Focus still needed on extension and overall quad control. Will continue to monitor patients response to treatment and advance per tolerance. [] No change. [] Other:  [x] Patient would continue to benefit from skilled physical therapy services in order to: improve R knee ROM and strength, improve gait mechanics without AD, and decrease pain to ease ADL's and improve quality of life        Goals  MET NOT MET ON-  GOING  Details   Date Addressed:            STG: To be met in 10 treatments            1. ? Pain: Decrease R knee pain levels to 2/10 with ADLs []  []  []      2. ? ROM: Increase R knee AROM to at least 0-120 deg to reduce difficulty with ADLs []  []  []      3. ? Strength: Demonstrate a SLR without quad lag to ease functional limitations and mobility  []  []  []      4. Independent with Home Exercise Programs []  []  []      5. Gait with single crutch or cane with equal LE weight bearing and minimal antalgia  []  []  []        []  []  []      Date Addressed:            LTG: To be met in 20 treatments           1. Improve score on assessment tool KOOS from 78% impairment to less than 40% impairment  []  []  []      2. Reduce pain levels to 0/10 or less with ADLs []  []  []      3. Gait without AD with equal LE weight bearing and good heel to toe transition on the R  []  []  []                                  Patient goals: pain relief, walk normal      Pt. Education:  [x] Yes  [] No  [x] Reviewed Prior HEP/Ed  Method of Education: [x] Verbal  [] Demo  [] Written  Comprehension of Education:  [x] Verbalizes understanding. [] Demonstrates understanding. [] Needs review. [x] Demonstrates/verbalizes HEP/Ed previously given. Plan: [x] Continue current frequency toward long and short term goals.     [x] Specific Instructions for subsequent treatments: Progress HEP and extension program.       Time In: 1350              Time Out: 145    Electronically signed by:  Lynette Aguirre, PTA

## 2022-12-08 ENCOUNTER — HOSPITAL ENCOUNTER (OUTPATIENT)
Dept: PHYSICAL THERAPY | Facility: CLINIC | Age: 52
Setting detail: THERAPIES SERIES
Discharge: HOME | End: 2022-12-08
Payer: COMMERCIAL

## 2022-12-08 PROCEDURE — 97016 VASOPNEUMATIC DEVICE THERAPY: CPT

## 2022-12-08 PROCEDURE — 97110 THERAPEUTIC EXERCISES: CPT

## 2022-12-08 NOTE — FLOWSHEET NOTE
[x] THE [facility] &  Therapy  [facility]   [place]: [phone]  F: [phone]      Physical Therapy Daily Treatment Note    Date:  2022  Patient Name:  Cristian Cardona    :  1970  MRN: [id number]  Physician: Dr. Lang Boot: RALPH/[employer] ( vs, Michael yr; $0Copay;Ded$1000met; Coins10%;OOP$2000/$101.06 remain)  Medical Diagnosis: Status post total right knee replacement           Rehab Codes: M62.81, R26.89, R60.0, M25.661, M79.604  Onset date: 11/10/22                         Next Dr's appt. : 22    Visit# / total visits: 3/20     Cancels/No Shows:     Subjective:    Pain:  [] Yes  [x] No Location: RLE  Pain Rating: (0-10 scale) 0/10  Pain altered Tx:  [x] No  [] Yes  Action:  Comments: Patient arrived noting no pain just continued stiffness, still ambulating with single cane. Objective:  Precautions: WBAT   Exercises:  Exercise     R TKA   DOS 11/10/22 Reps/ Time Weight/ Level Comments             NuStep   10'                 Calf Stretch   3x30\"       Hamstring Stretch   3x30\"                 Quad Set   10x5\"       SLR   2x10    Minimal quad lag noted   Heel Slides   x10       Clamshells   2x10       Bridges   2x10       Sidelying hip abduction   2x10       Prone hang   5'                   4-way hip Tband   x15  Green     Tband TKE   10x10\"  Blue     Reverse TKE  10x10\"     Total Gym Squats   2x10  L20     Total Gym Heel Raises  2x10  L20    Step ups  2x10  6\"     Lunges  2x10                    ROM: 5-110       Other: vasocompression x15 min 34 degrees moderate compression      Treatment Charges: Mins Units   []  Modalities     [x]  Ther Exercise 38 3   []  Manual Therapy     []  Ther Activities     []  Aquatics     [x]  Vasocompression 15 1   []  Other     Total Treatment time 50 3       Assessment: [x] Progressing toward goals.  Continued strength and ROM progressions this date. No pain noted with progressions just general fatigue. Continued improvements in ROM noted this date. Continued focus on stability and eccentric control needed. Updated HEP this date with hand outs and resistive bands. [] No change. [] Other:  [x] Patient would continue to benefit from skilled physical therapy services in order to: improve R knee ROM and strength, improve gait mechanics without AD, and decrease pain to ease ADL's and improve quality of life        Goals  MET NOT MET ON-  GOING  Details   Date Addressed:            STG: To be met in 10 treatments            1. ? Pain: Decrease R knee pain levels to 2/10 with ADLs []  []  []      2. ? ROM: Increase R knee AROM to at least 0-120 deg to reduce difficulty with ADLs []  []  []      3. ? Strength: Demonstrate a SLR without quad lag to ease functional limitations and mobility  []  []  []      4. Independent with Home Exercise Programs []  []  []      5. Gait with single crutch or cane with equal LE weight bearing and minimal antalgia  []  []  []        []  []  []      Date Addressed:            LTG: To be met in 20 treatments           1. Improve score on assessment tool KOOS from 78% impairment to less than 40% impairment  []  []  []      2. Reduce pain levels to 0/10 or less with ADLs []  []  []      3. Gait without AD with equal LE weight bearing and good heel to toe transition on the R  []  []  []                                  Patient goals: pain relief, walk normal      Pt. Education:  [x] Yes  [] No  [x] Reviewed Prior HEP/Ed  Method of Education: [x] Verbal  [] Demo  [x] Written:  Access Code: [email or web address]  URL: [email or web address]  Date: 12/08/2022  Prepared by: Carlos Welch    Exercises  Standing Hip Abduction with Anchored Resistance - 1 x daily - 7 x weekly - 3 sets - 10 reps  Standing Hip Extension with Anchored Resistance - 1 x daily - 7 x weekly - 3 sets - 10 reps  Standing Hip Adduction with Anchored Resistance - 1 x daily - 7 x weekly - 3 sets - 10 reps  Standing Repeated Hip Flexion with Resistance - 1 x daily - 7 x weekly - 3 sets - 10 reps  Standing Terminal Knee Extension with Resistance - 1 x daily - 7 x weekly - 3 sets - 10 reps - 30 (sec) hold  Standard Lunge - 1 x daily - 7 x weekly - 3 sets - 10 reps  Clamshell - 1 x daily - 7 x weekly - 3 sets - 10 reps  Sidelying Hip Abduction - 1 x daily - 7 x weekly - 3 sets - 10 reps  Supine Bridge - 1 x daily - 7 x weekly - 3 sets - 10 reps    Comprehension of Education:  [x] Verbalizes understanding. [] Demonstrates understanding. [] Needs review. [x] Demonstrates/verbalizes HEP/Ed previously given. Plan: [x] Continue current frequency toward long and short term goals.     [x] Specific Instructions for subsequent treatments: Progress HEP and extension program.       Time In: 6102              Time Out: 1410    Electronically signed by:  Clementina Arndt PTA

## 2022-12-12 ENCOUNTER — HOSPITAL ENCOUNTER (OUTPATIENT)
Dept: PHYSICAL THERAPY | Facility: CLINIC | Age: 52
Setting detail: THERAPIES SERIES
Discharge: HOME | End: 2022-12-12
Payer: COMMERCIAL

## 2022-12-12 PROCEDURE — 97016 VASOPNEUMATIC DEVICE THERAPY: CPT

## 2022-12-12 PROCEDURE — 97110 THERAPEUTIC EXERCISES: CPT

## 2022-12-12 NOTE — FLOWSHEET NOTE
[x] THE [facility] &  Therapy  [facility]   [place]: [phone]  F: [phone]      Physical Therapy Daily Treatment Note    Date:  2022  Patient Name:  Yuly Berumen    :  1970  MRN: [id number]  Physician: Dr. Mark Parada: BCBS/[place] ( vs, Michael yr; $0Copay;Ded$1000met; Coins10%;OOP$2000/$101.06 remain)  Medical Diagnosis: Status post total right knee replacement           Rehab Codes: M62.81, R26.89, R60.0, M25.661, M79.604  Onset date: 11/10/22                         Next Dr's appt. : 22    Visit# / total visits:      Cancels/No Shows:     Subjective:    Pain:  [] Yes  [x] No Location: RLE  Pain Rating: (0-10 scale) 0/10  Pain altered Tx:  [x] No  [] Yes  Action:  Comments: Patient continues to arrive with complaints of stiffness and ambulating with single cane. Pt notes he plans on going to the gym to use Nust[employer] and bike.       Objective:  Precautions: WBAT   Exercises:  Exercise     R TKA   DOS 11/10/22 Reps/ Time Weight/ Level Comments             NuStep   --       Bike   10'     partial revolutions new           Calf Stretch   3x30\"       Hamstring Stretch   3x30\"                 Quad Set   10x5\"       SLR   2x10    Minimal quad lag noted   Heel Slides   x10       Heel slides with slight OP   x5  By therapist    Knee extension stretch slight OP   5x5\"   By therapist     Clamshells   2x10       Bridges   2x10       Sidelying hip abduction   2x10       Prone hang   5'                   4-way hip Tband   x15  Green     Tband TKE   10x10\"  Blue     Reverse TKE  10x10\"  Blue     Total Gym Squats   2x10  L20  held d/t time   Total Gym Heel Raises  2x10  L20  Held d/t time   Step ups  2x10  6\"  held d/t time    Lunges  2x10                    ROM: 22  Flexion: 103 A, 105 P   Ext: 5A, 4P        Other: vasocompression x15 min 34 degrees moderate compression      Treatment Charges: Mins Units   []  Modalities     [x]  Ther Exercise 50 3   []  Manual Therapy     []  Ther Activities     []  Aquatics     [x]  Vasocompression 15 1   []  Other     Total Treatment time 65 4       Assessment: [x] Progressing toward goals. Initiated treatment with bike warm up as pt is able to tolerate partial revolutions to improve ROM. Continued with standing stretching to reduce muscular tension with mod tolerance. Implemented slight OP flexion and extension stretching to improve ROM with mod tolerance secondary to pain symptoms. Details of ROM measurements listed above. Pt reports the feeling of instability during R leg SLS  therefore, is hesitant to perform CKC exercises with this leg however, is able to complete with B UE assist. Ended with vasocompression to reduce soreness post session. Will continue to progress ROM and strength per tolerance. [] No change. [] Other:  [x] Patient would continue to benefit from skilled physical therapy services in order to: improve R knee ROM and strength, improve gait mechanics without AD, and decrease pain to ease ADL's and improve quality of life        Goals  MET NOT MET ON-  GOING  Details   Date Addressed:            STG: To be met in 10 treatments            1. ? Pain: Decrease R knee pain levels to 2/10 with ADLs []  []  []      2. ? ROM: Increase R knee AROM to at least 0-120 deg to reduce difficulty with ADLs []  []  []      3. ? Strength: Demonstrate a SLR without quad lag to ease functional limitations and mobility  []  []  []      4. Independent with Home Exercise Programs []  []  []      5. Gait with single crutch or cane with equal LE weight bearing and minimal antalgia  []  []  []        []  []  []      Date Addressed:            LTG: To be met in 20 treatments           1. Improve score on assessment tool KOOS from 78% impairment to less than 40% impairment  []  []  []      2.  Reduce pain levels to 0/10 or less with ADLs []  []  []      3. Gait without AD with equal LE weight bearing and good heel to toe transition on the R  []  []  []                                  Patient goals: pain relief, walk normal      Pt. Education:  [x] Yes  [] No  [x] Reviewed Prior HEP/Ed  Method of Education: [x] Verbal  [] Demo  [] Written:    Access Code: [email or web address]  URL: [email or web address]  Date: 12/08/2022  Prepared by: Maxime De Luna  Standing Hip Abduction with Anchored Resistance - 1 x daily - 7 x weekly - 3 sets - 10 reps  Standing Hip Extension with Anchored Resistance - 1 x daily - 7 x weekly - 3 sets - 10 reps  Standing Hip Adduction with Anchored Resistance - 1 x daily - 7 x weekly - 3 sets - 10 reps  Standing Repeated Hip Flexion with Resistance - 1 x daily - 7 x weekly - 3 sets - 10 reps  Standing Terminal Knee Extension with Resistance - 1 x daily - 7 x weekly - 3 sets - 10 reps - 30 (sec) hold  Standard Lunge - 1 x daily - 7 x weekly - 3 sets - 10 reps  Clamshell - 1 x daily - 7 x weekly - 3 sets - 10 reps  Sidelying Hip Abduction - 1 x daily - 7 x weekly - 3 sets - 10 reps  Supine Bridge - 1 x daily - 7 x weekly - 3 sets - 10 reps    Comprehension of Education:  [] Verbalizes understanding. [] Demonstrates understanding. [] Needs review. [x] Demonstrates/verbalizes HEP/Ed previously given. Plan: [x] Continue current frequency toward long and short term goals.     [x] Specific Instructions for subsequent treatments: Progress HEP and extension program.       Time In: 10:00 am              Time Out: 11:15 am    Electronically signed by:  Floresita Johnson PTA

## 2022-12-15 ENCOUNTER — HOSPITAL ENCOUNTER (OUTPATIENT)
Dept: PHYSICAL THERAPY | Facility: CLINIC | Age: 52
Setting detail: THERAPIES SERIES
Discharge: HOME | End: 2022-12-15
Payer: COMMERCIAL

## 2022-12-15 PROCEDURE — 97016 VASOPNEUMATIC DEVICE THERAPY: CPT

## 2022-12-15 PROCEDURE — 97110 THERAPEUTIC EXERCISES: CPT

## 2022-12-15 NOTE — FLOWSHEET NOTE
[x] THE [facility] &  Therapy  [facility]   [place]: [phone]  F: [phone]      Physical Therapy Daily Treatment Note    Date:  12/15/2022  Patient Name:  Marlena Abernathy    :  1970  MRN: [id number]  Physician: Dr. Mcclain Lint: [employer] ( vs, Michael yr; $0Copay;Ded$1000met; Coins10%;OOP$2000/$101.06 remain)  Medical Diagnosis: Status post total right knee replacement           Rehab Codes: M62.81, R26.89, R60.0, M25.661, M79.604  Onset date: 11/10/22                         Next Dr's appt. : 22    Visit# / total visits:      Cancels/No Shows:     Subjective:    Pain:  [] Yes  [x] No Location: RLE  Pain Rating: (0-10 scale) 0/10  Pain altered Tx:  [x] No  [] Yes  Action:  Comments: Patient arrived noting no pain and has been complaint with HEP.        Objective:  Precautions: WBAT   Exercises:  Exercise     R TKA   DOS 11/10/22 Reps/ Time Weight/ Level Comments            Bike   10'             Calf Stretch   3x30\"       Hamstring Stretch   3x30\"                 SLR   2x10    Minimal quad lag noted   Heel Slides   x10       Heel slides with slight OP   x5     Knee extension stretch slight OP   5x5\"      Clamshells   2x10       Bridges   2x10       Sidelying hip abduction   2x10       Prone hang   5'  5#                 4-way hip Tband   x15  Green     Tband TKE   10x10\"  Blue    Reverse TKE  10x10\"  Blue     Total Gym Squats   2x10  L20    Total Gym Heel Raises  2x10  L20    Step ups  2x10  6\"    Step downs  2x10  4\"    Heel tap  2x10  4\"    Lunges  2x10     Balance board  5'  L2    SLS  3x15\"                                ROM: 22  Flexion: 103 A, 105 P   Ext: 5A, 4P        Other: vasocompression x15 min 34 degrees moderate compression      Treatment Charges: Mins Units   []  Modalities     [x]  Ther Exercise 45 3   []  Manual Therapy     []  Ther Activities     []  Aquatics [x]  Vasocompression 15 1   []  Other     Total Treatment time 60 4       Assessment: [x] Progressing toward goals. Progressed strength and SL stability program this date. Patient still very unstable with SL activities. Will continue with focus on SL stability and eliminating need for assistive device. [] No change. [] Other:  [x] Patient would continue to benefit from skilled physical therapy services in order to: improve R knee ROM and strength, improve gait mechanics without AD, and decrease pain to ease ADL's and improve quality of life        Goals  MET NOT MET ON-  GOING  Details   Date Addressed:            STG: To be met in 10 treatments            1. ? Pain: Decrease R knee pain levels to 2/10 with ADLs []  []  []      2. ? ROM: Increase R knee AROM to at least 0-120 deg to reduce difficulty with ADLs []  []  []      3. ? Strength: Demonstrate a SLR without quad lag to ease functional limitations and mobility  []  []  []      4. Independent with Home Exercise Programs []  []  []      5. Gait with single crutch or cane with equal LE weight bearing and minimal antalgia  []  []  []        []  []  []      Date Addressed:            LTG: To be met in 20 treatments           1. Improve score on assessment tool KOOS from 78% impairment to less than 40% impairment  []  []  []      2. Reduce pain levels to 0/10 or less with ADLs []  []  []      3. Gait without AD with equal LE weight bearing and good heel to toe transition on the R  []  []  []                                  Patient goals: pain relief, walk normal      Pt. Education:  [x] Yes  [] No  [x] Reviewed Prior HEP/Ed  Method of Education: [x] Verbal  [] Demo  [] Written:    Access Code: [email or web address]  URL: [email or web address]  Date: 12/08/2022  Prepared by: Jarrell Brito  Standing Hip Abduction with Anchored Resistance - 1 x daily - 7 x weekly - 3 sets - 10 reps  Standing Hip Extension with Anchored Resistance - 1 x daily - 7 x weekly - 3 sets - 10 reps  Standing Hip Adduction with Anchored Resistance - 1 x daily - 7 x weekly - 3 sets - 10 reps  Standing Repeated Hip Flexion with Resistance - 1 x daily - 7 x weekly - 3 sets - 10 reps  Standing Terminal Knee Extension with Resistance - 1 x daily - 7 x weekly - 3 sets - 10 reps - 30 (sec) hold  Standard Lunge - 1 x daily - 7 x weekly - 3 sets - 10 reps  Clamshell - 1 x daily - 7 x weekly - 3 sets - 10 reps  Sidelying Hip Abduction - 1 x daily - 7 x weekly - 3 sets - 10 reps  Supine Bridge - 1 x daily - 7 x weekly - 3 sets - 10 reps    Comprehension of Education:  [] Verbalizes understanding. [] Demonstrates understanding. [] Needs review. [x] Demonstrates/verbalizes HEP/Ed previously given. Plan: [x] Continue current frequency toward long and short term goals.     [x] Specific Instructions for subsequent treatments: Progress HEP and extension program.       Time In: 4838               Time Out: 5151    Electronically signed by:  Augustine Tarango PTA

## 2022-12-19 ENCOUNTER — HOSPITAL ENCOUNTER (OUTPATIENT)
Dept: PHYSICAL THERAPY | Facility: CLINIC | Age: 52
Setting detail: THERAPIES SERIES
Discharge: HOME | End: 2022-12-19
Payer: COMMERCIAL

## 2022-12-19 PROCEDURE — 97110 THERAPEUTIC EXERCISES: CPT

## 2022-12-19 PROCEDURE — 97016 VASOPNEUMATIC DEVICE THERAPY: CPT

## 2022-12-19 NOTE — FLOWSHEET NOTE
[x] THE [facility] &  Therapy  [facility]   [place]: [phone]  F: [phone]      Physical Therapy Daily Treatment Note    Date:  2022  Patient Name:  Marisela Emerson    :  1970  MRN: [id number]  Physician: Dr. Martha Lala: BCBS/[place] ( vs, Michael yr; $0Copay;Ded$1000met; Coins10%;OOP$2000/$101.06 remain)  Medical Diagnosis: Status post total right knee replacement           Rehab Codes: M62.81, R26.89, R60.0, M25.661, M79.604  Onset date: 11/10/22                         Next 's appt. : 22    Visit# / total visits:      Cancels/No Shows:     Subjective:    Pain:  [] Yes  [x] No Location: RLE  Pain Rating: (0-10 scale) 0/10  Pain altered Tx:  [x] No  [] Yes  Action:  Comments: Patient arrived noting no pain just general stiffness. Patient still ambulating to clinic with single crutch, but states he only uses a cane at home.        Objective:  Precautions: WBAT   Exercises:  Exercise     R TKA   DOS 11/10/22 Reps/ Time Weight/ Level Comments            Bike   10'             Calf Stretch   3x30\"       Hamstring Stretch   3x30\"                 SLR   2x10    Minimal quad lag noted   Heel Slides   x10       Clamshells   2x10       Bridges   2x10       Sidelying hip abduction   2x10       Prone hang   5'  5#                 4-way hip Tband   x15  Green     Tband TKE   10x10\"  Purple    Reverse TKE  10x10\"  Purple    Total Gym Squats   2x10  L20    Total Gym Heel Raises  2x10  L20    Step ups  2x10  6\"    Step downs  2x10  4\"    Heel tap  2x10  4\"    Lunges  2x10     Balance board  5'  L2    SLS  3x15\"                                ROM: 22  Flexion: 103 A, 105 P   Ext: 5A, 4P        Other: vasocompression x15 min 34 degrees moderate compression      Treatment Charges: Mins Units   []  Modalities     [x]  Ther Exercise 40 3   []  Manual Therapy     []  Ther Activities     [] Aquatics     [x]  Vasocompression 15 1   []  Other     Total Treatment time 55 4       Assessment: [x] Progressing toward goals. Continued SL strength and stability progressions this date. Patient is showing improved balance and stability. Less compensation noted with steps this date. Continued focus needed on eccentric control. Will continue strength and SL stability progressions per tolerance. [] No change. [] Other:  [x] Patient would continue to benefit from skilled physical therapy services in order to: improve R knee ROM and strength, improve gait mechanics without AD, and decrease pain to ease ADL's and improve quality of life        Goals  MET NOT MET ON-  GOING  Details   Date Addressed:            STG: To be met in 10 treatments            1. ? Pain: Decrease R knee pain levels to 2/10 with ADLs []  []  []      2. ? ROM: Increase R knee AROM to at least 0-120 deg to reduce difficulty with ADLs []  []  []      3. ? Strength: Demonstrate a SLR without quad lag to ease functional limitations and mobility  []  []  []      4. Independent with Home Exercise Programs []  []  []      5. Gait with single crutch or cane with equal LE weight bearing and minimal antalgia  []  []  []        []  []  []      Date Addressed:            LTG: To be met in 20 treatments           1. Improve score on assessment tool KOOS from 78% impairment to less than 40% impairment  []  []  []      2. Reduce pain levels to 0/10 or less with ADLs []  []  []      3. Gait without AD with equal LE weight bearing and good heel to toe transition on the R  []  []  []                                  Patient goals: pain relief, walk normal      Pt. Education:  [x] Yes  [] No  [x] Reviewed Prior HEP/Ed  Method of Education: [x] Verbal  [] Demo  [] Written:    Access Code: [email or web address]  URL: [email or web address]  Date: 12/08/2022  Prepared by: Eli Jay  Standing Hip Abduction with Anchored Resistance - 1 x daily - 7 x weekly - 3 sets - 10 reps  Standing Hip Extension with Anchored Resistance - 1 x daily - 7 x weekly - 3 sets - 10 reps  Standing Hip Adduction with Anchored Resistance - 1 x daily - 7 x weekly - 3 sets - 10 reps  Standing Repeated Hip Flexion with Resistance - 1 x daily - 7 x weekly - 3 sets - 10 reps  Standing Terminal Knee Extension with Resistance - 1 x daily - 7 x weekly - 3 sets - 10 reps - 30 (sec) hold  Standard Lunge - 1 x daily - 7 x weekly - 3 sets - 10 reps  Clamshell - 1 x daily - 7 x weekly - 3 sets - 10 reps  Sidelying Hip Abduction - 1 x daily - 7 x weekly - 3 sets - 10 reps  Supine Bridge - 1 x daily - 7 x weekly - 3 sets - 10 reps    Comprehension of Education:  [] Verbalizes understanding. [] Demonstrates understanding. [] Needs review. [x] Demonstrates/verbalizes HEP/Ed previously given. Plan: [x] Continue current frequency toward long and short term goals.     [x] Specific Instructions for subsequent treatments: Progress HEP and extension program.       Time In: 1055               Time Out: 5702    Electronically signed by:  Elton Dickens PTA

## 2022-12-22 ENCOUNTER — HOSPITAL ENCOUNTER (OUTPATIENT)
Dept: PHYSICAL THERAPY | Facility: CLINIC | Age: 52
Setting detail: THERAPIES SERIES
Discharge: HOME | End: 2022-12-22
Payer: COMMERCIAL

## 2022-12-22 PROCEDURE — 97110 THERAPEUTIC EXERCISES: CPT

## 2022-12-22 PROCEDURE — 97016 VASOPNEUMATIC DEVICE THERAPY: CPT

## 2022-12-22 NOTE — FLOWSHEET NOTE
[x] THE [facility] &  Therapy  [facility]   [place]: [phone]  F: [phone]      Physical Therapy Daily Treatment Note    Date:  2022  Patient Name:  Laura Corley    :  1970  MRN: [id number]  Physician: Dr. Javier Court: BCBS/[employer] ( vs, Michael yr; $0Copay;Ded$1000met; Coins10%;OOP$2000/$101.06 remain)  Medical Diagnosis: Status post total right knee replacement           Rehab Codes: M62.81, R26.89, R60.0, M25.661, M79.604  Onset date: 11/10/22                         Next Dr's appt. : 22    Visit# / total visits:      Cancels/No Shows:     Subjective:    Pain:  [] Yes  [x] No Location: RLE  Pain Rating: (0-10 scale) 0/10  Pain altered Tx:  [x] No  [] Yes  Action:  Comments: Patient arrived noting no pain and general improvements in mobility.        Objective:  Precautions: WBAT   Exercises:  Exercise     R TKA   DOS 11/10/22 Reps/ Time Weight/ Level Comments            Bike   10'             Calf Stretch   3x30\"       Hamstring Stretch   3x30\"                 SLR   2x10    Minimal quad lag noted   Heel Slides   x10       Clamshells   2x10       Bridges   2x10       Sidelying hip abduction   2x10       Prone hang   5'  5#                 4-way hip Tband   x15  Blue     Tband TKE   10x10\"  Purple    Reverse TKE  10x10\"  Purple    Total Gym Squats   2x10  L20    Total Gym Heel Raises  2x10  L20    Step ups  2x10  6\"    Step downs  2x10  4\"    Heel tap  2x10  4\"    Lunges  2x10     Balance board  5'  L2    SLS  3x15\"     Bench squats  2x10                          ROM: 22  Flexion: 110 A  Ext: 5A, 4P        Other: vasocompression x15 min 34 degrees moderate compression      Treatment Charges: Mins Units   []  Modalities     [x]  Ther Exercise 40 3   []  Manual Therapy     []  Ther Activities     []  Aquatics     [x]  Vasocompression 15 1   []  Other     Total Treatment time 55 4       Assessment: [x] Progressing toward goals. Continued strength and stability progressions this date. Patient showing improved stability and mobility this date. Improved flexion noted, continued focus still needed on extension. Possible trial of extension board next visit. [] No change. [] Other:  [x] Patient would continue to benefit from skilled physical therapy services in order to: improve R knee ROM and strength, improve gait mechanics without AD, and decrease pain to ease ADL's and improve quality of life        Goals  MET NOT MET ON-  GOING  Details   Date Addressed:            STG: To be met in 10 treatments            1. ? Pain: Decrease R knee pain levels to 2/10 with ADLs []  []  []      2. ? ROM: Increase R knee AROM to at least 0-120 deg to reduce difficulty with ADLs []  []  []      3. ? Strength: Demonstrate a SLR without quad lag to ease functional limitations and mobility  []  []  []      4. Independent with Home Exercise Programs []  []  []      5. Gait with single crutch or cane with equal LE weight bearing and minimal antalgia  []  []  []        []  []  []      Date Addressed:            LTG: To be met in 20 treatments           1. Improve score on assessment tool KOOS from 78% impairment to less than 40% impairment  []  []  []      2. Reduce pain levels to 0/10 or less with ADLs []  []  []      3. Gait without AD with equal LE weight bearing and good heel to toe transition on the R  []  []  []                                  Patient goals: pain relief, walk normal      Pt. Education:  [x] Yes  [] No  [x] Reviewed Prior HEP/Ed  Method of Education: [x] Verbal  [] Demo  [] Written:    Access Code: [email or web address]  URL: [email or web address]  Date: 12/08/2022  Prepared by: Sánchez Ma  Standing Hip Abduction with Anchored Resistance - 1 x daily - 7 x weekly - 3 sets - 10 reps  Standing Hip Extension with Anchored Resistance - 1 x daily - 7 x weekly - 3 sets - 10 reps  Standing Hip Adduction with Anchored Resistance - 1 x daily - 7 x weekly - 3 sets - 10 reps  Standing Repeated Hip Flexion with Resistance - 1 x daily - 7 x weekly - 3 sets - 10 reps  Standing Terminal Knee Extension with Resistance - 1 x daily - 7 x weekly - 3 sets - 10 reps - 30 (sec) hold  Standard Lunge - 1 x daily - 7 x weekly - 3 sets - 10 reps  Clamshell - 1 x daily - 7 x weekly - 3 sets - 10 reps  Sidelying Hip Abduction - 1 x daily - 7 x weekly - 3 sets - 10 reps  Supine Bridge - 1 x daily - 7 x weekly - 3 sets - 10 reps    Comprehension of Education:  [] Verbalizes understanding. [] Demonstrates understanding. [] Needs review. [x] Demonstrates/verbalizes HEP/Ed previously given. Plan: [x] Continue current frequency toward long and short term goals.     [x] Specific Instructions for subsequent treatments: Progress HEP and extension program.       Time In: 1000               Time Out: 1952    Electronically signed by:  Rama Andersen PTA

## 2022-12-27 ENCOUNTER — HOSPITAL ENCOUNTER (OUTPATIENT)
Dept: PHYSICAL THERAPY | Facility: CLINIC | Age: 52
Setting detail: THERAPIES SERIES
Discharge: HOME | End: 2022-12-27
Payer: COMMERCIAL

## 2022-12-27 PROCEDURE — 97110 THERAPEUTIC EXERCISES: CPT

## 2022-12-27 PROCEDURE — 97016 VASOPNEUMATIC DEVICE THERAPY: CPT

## 2022-12-27 NOTE — FLOWSHEET NOTE
4       Assessment: [x] Progressing toward goals. Restricted knee ROM resulting in difficulty reina quad and maintaining terminal knee extension with SLR's and resistance bands. Improved knee flexion. Will advance single leg stability next visit adding rebounder. [] No change. [] Other:  [x] Patient would continue to benefit from skilled physical therapy services in order to: improve R knee ROM and strength, improve gait mechanics without AD, and decrease pain to ease ADL's and improve quality of life        Goals  MET NOT MET ON-  GOING  Details   Date Addressed:            STG: To be met in 10 treatments            1. ? Pain: Decrease R knee pain levels to 2/10 with ADLs []  []  []      2. ? ROM: Increase R knee AROM to at least 0-120 deg to reduce difficulty with ADLs []  []  []      3. ? Strength: Demonstrate a SLR without quad lag to ease functional limitations and mobility  []  []  []      4. Independent with Home Exercise Programs []  []  []      5. Gait with single crutch or cane with equal LE weight bearing and minimal antalgia  []  []  []        []  []  []      Date Addressed:            LTG: To be met in 20 treatments           1. Improve score on assessment tool KOOS from 78% impairment to less than 40% impairment  []  []  []      2. Reduce pain levels to 0/10 or less with ADLs []  []  []      3. Gait without AD with equal LE weight bearing and good heel to toe transition on the R  []  []  []                                  Patient goals: pain relief, walk normal      Pt. Education:  [x] Yes  [] No  [x] Reviewed Prior HEP/Ed  Method of Education: [x] Verbal  [] Demo  [] Written:    Access Code: [email or web address]  URL: [email or web address]/  Date: 12/08/2022  Prepared by: Dennis Frank  Standing Hip Abduction with Anchored Resistance - 1 x daily - 7 x weekly - 3 sets - 10 reps  Standing Hip Extension with Anchored Resistance - 1 x daily - 7 x weekly - 3 sets - 10 reps  Standing Hip Adduction with Anchored Resistance - 1 x daily - 7 x weekly - 3 sets - 10 reps  Standing Repeated Hip Flexion with Resistance - 1 x daily - 7 x weekly - 3 sets - 10 reps  Standing Terminal Knee Extension with Resistance - 1 x daily - 7 x weekly - 3 sets - 10 reps - 30 (sec) hold  Standard Lunge - 1 x daily - 7 x weekly - 3 sets - 10 reps  Clamshell - 1 x daily - 7 x weekly - 3 sets - 10 reps  Sidelying Hip Abduction - 1 x daily - 7 x weekly - 3 sets - 10 reps  Supine Bridge - 1 x daily - 7 x weekly - 3 sets - 10 reps    Comprehension of Education:  [] Verbalizes understanding. [] Demonstrates understanding. [] Needs review. [x] Demonstrates/verbalizes HEP/Ed previously given. Plan: [x] Continue current frequency toward long and short term goals.     [x] Specific Instructions for subsequent treatments: Progress HEP and extension program.       Time In: 10:00am               Time Out: 11:20am    Electronically signed by:  Jaime Ellis PTA

## 2022-12-29 ENCOUNTER — HOSPITAL ENCOUNTER (OUTPATIENT)
Dept: PHYSICAL THERAPY | Facility: CLINIC | Age: 52
Setting detail: THERAPIES SERIES
Discharge: HOME | End: 2022-12-29
Payer: COMMERCIAL

## 2022-12-29 PROCEDURE — 97016 VASOPNEUMATIC DEVICE THERAPY: CPT

## 2022-12-29 PROCEDURE — 97110 THERAPEUTIC EXERCISES: CPT

## 2022-12-29 NOTE — FLOWSHEET NOTE
[x] THE [facility] &  Therapy  [facility]   [place]: [phone]  F: [phone]      Physical Therapy Daily Treatment Note    Date:  2022  Patient Name:  Alicia Garcias    :  1970  MRN: [id number]  Physician: Dr. Buck Chavira: BCBS/[place] ( vs, Michael yr; $0Copay;Ded$1000met; Coins10%;OOP$2000/$101.06 remain)  Medical Diagnosis: Status post total right knee replacement           Rehab Codes: M62.81, R26.89, R60.0, M25.661, M79.604  Onset date: 11/10/22                         Next 's appt. : 22    Visit# / total visits:      Cancels/No Shows:     Subjective:    Pain:  [] Yes  [x] No Location: RLE  Pain Rating: (0-10 scale) 0/10  Pain altered Tx:  [x] No  [] Yes  Action:  Comments: Patient arrived noting no pain just stiffness. Notes is not using an assistive device at home just out in public.        Objective:  Precautions: WBAT   Exercises:  Exercise     R TKA   DOS 11/10/22 Reps/ Time Weight/ Level Comments            Bike   10'             Calf Stretch   3x30\"       Hamstring Stretch   3x30\"                 SLR   2x10       Heel Slides   x10       Clamshells   2x10       Bridges   2x10       Sidelying hip abduction   2x10       Prone hang   5'  5#                 4-way hip Tband   x20  Blue     Tband TKE   10x10\"  Purple    Reverse TKE  10x10\"  Purple    Total Gym Squats   2x10  L20    Total Gym Heel Raises  2x10  L20    Step ups  2x10  6\"    Step downs  2x10  4\"    Heel tap  2x10  4\"    Lunges  2x10     Balance board  5'  L2    Bench squats  2x10     SLS Rebounder  2x10                    Other: Vasocompression x15 min 34 degrees moderate compression      Treatment Charges: Mins Units   []  Modalities     [x]  Ther Exercise 45 3   []  Manual Therapy     []  Ther Activities     []  Aquatics     [x]  Vasocompression 15 1   []  Other     Total Treatment time 60 4 Assessment: [x] Progressing toward goals. Patient continues to demonstrate improved SL stability with minor LOB noted. Overall improved ROM and less discomfort noted with strength progressions. Patient to return to surgeon 1-9-23 will plan further progressions at that point. [] No change. [] Other:  [x] Patient would continue to benefit from skilled physical therapy services in order to: improve R knee ROM and strength, improve gait mechanics without AD, and decrease pain to ease ADL's and improve quality of life        Goals  MET NOT MET ON-  GOING  Details   Date Addressed:            STG: To be met in 10 treatments            1. ? Pain: Decrease R knee pain levels to 2/10 with ADLs []  []  []      2. ? ROM: Increase R knee AROM to at least 0-120 deg to reduce difficulty with ADLs []  []  []      3. ? Strength: Demonstrate a SLR without quad lag to ease functional limitations and mobility  []  []  []      4. Independent with Home Exercise Programs []  []  []      5. Gait with single crutch or cane with equal LE weight bearing and minimal antalgia  []  []  []        []  []  []      Date Addressed:            LTG: To be met in 20 treatments           1. Improve score on assessment tool KOOS from 78% impairment to less than 40% impairment  []  []  []      2. Reduce pain levels to 0/10 or less with ADLs []  []  []      3. Gait without AD with equal LE weight bearing and good heel to toe transition on the R  []  []  []                                  Patient goals: pain relief, walk normal      Pt. Education:  [x] Yes  [] No  [x] Reviewed Prior HEP/Ed  Method of Education: [x] Verbal  [] Demo  [] Written:    Access Code: [email or web address]  URL: [employer]. com/  Date: 12/08/2022  Prepared by: Marisabel Almonte  Standing Hip Abduction with Anchored Resistance - 1 x daily - 7 x weekly - 3 sets - 10 reps  Standing Hip Extension with Anchored Resistance - 1 x daily - 7 x weekly - 3 sets - 10 reps  Standing Hip Adduction with Anchored Resistance - 1 x daily - 7 x weekly - 3 sets - 10 reps  Standing Repeated Hip Flexion with Resistance - 1 x daily - 7 x weekly - 3 sets - 10 reps  Standing Terminal Knee Extension with Resistance - 1 x daily - 7 x weekly - 3 sets - 10 reps - 30 (sec) hold  Standard Lunge - 1 x daily - 7 x weekly - 3 sets - 10 reps  Clamshell - 1 x daily - 7 x weekly - 3 sets - 10 reps  Sidelying Hip Abduction - 1 x daily - 7 x weekly - 3 sets - 10 reps  Supine Bridge - 1 x daily - 7 x weekly - 3 sets - 10 reps    Comprehension of Education:  [] Verbalizes understanding. [] Demonstrates understanding. [] Needs review. [x] Demonstrates/verbalizes HEP/Ed previously given. Plan: [x] Continue current frequency toward long and short term goals.     [x] Specific Instructions for subsequent treatments: Progress HEP and extension program.       Time In: 10:00am               Time Out: 11:15am    Electronically signed by:  Rama Andersen PTA

## 2023-08-30 ENCOUNTER — HOSPITAL ENCOUNTER (OUTPATIENT)
Dept: GENERAL RADIOLOGY | Age: 53
Discharge: HOME | End: 2023-09-01
Payer: COMMERCIAL

## 2023-08-30 ENCOUNTER — HOSPITAL ENCOUNTER (OUTPATIENT)
Age: 53
Discharge: HOME | End: 2023-09-01
Payer: COMMERCIAL

## 2023-08-30 DIAGNOSIS — M25.562 LEFT KNEE PAIN, UNSPECIFIED CHRONICITY: ICD-10-CM

## 2023-08-30 PROCEDURE — 73564 X-RAY EXAM KNEE 4 OR MORE: CPT

## 2023-11-07 ENCOUNTER — HOSPITAL ENCOUNTER (OUTPATIENT)
Dept: PREADMISSION TESTING | Age: 53
Discharge: HOME | End: 2023-11-07

## 2024-04-22 ENCOUNTER — HOSPITAL ENCOUNTER (OUTPATIENT)
Age: 54
Discharge: HOME | End: 2024-04-24
Payer: COMMERCIAL

## 2024-04-22 ENCOUNTER — HOSPITAL ENCOUNTER (OUTPATIENT)
Dept: GENERAL RADIOLOGY | Age: 54
Discharge: HOME | End: 2024-04-24
Payer: COMMERCIAL

## 2024-04-22 DIAGNOSIS — Z96.651 PRESENCE OF RIGHT ARTIFICIAL KNEE JOINT: ICD-10-CM

## 2024-04-22 PROCEDURE — 73564 X-RAY EXAM KNEE 4 OR MORE: CPT

## 2024-05-07 ENCOUNTER — HOSPITAL ENCOUNTER (OUTPATIENT)
Facility: CLINIC | Age: 54
Discharge: HOME | End: 2024-05-07
Payer: COMMERCIAL

## 2024-05-07 LAB
25(OH)D3 SERPL-MCNC: 20 NG/ML (ref 30–100)
ALT SERPL-CCNC: 46 U/L (ref 10–50)
ANION GAP SERPL CALCULATED.3IONS-SCNC: 10 MMOL/L (ref 9–16)
AST SERPL-CCNC: 33 U/L (ref 10–50)
BASOPHILS # BLD: 0.05 K/UL (ref 0–0.2)
BASOPHILS NFR BLD: 1 % (ref 0–2)
BUN SERPL-MCNC: 22 MG/DL (ref 6–20)
CALCIUM SERPL-MCNC: 9.3 MG/DL (ref 8.6–10.4)
CHLORIDE SERPL-SCNC: 105 MMOL/L (ref 98–107)
CHOLEST SERPL-MCNC: 95 MG/DL (ref 0–199)
CHOLESTEROL/HDL RATIO: 3
CO2 SERPL-SCNC: 26 MMOL/L (ref 20–31)
CREAT SERPL-MCNC: 1 MG/DL (ref 0.7–1.2)
CRP SERPL HS-MCNC: <3 MG/L (ref 0–5)
DHEA-S SERPL-MCNC: 33 UG/DL (ref 33.6–246)
EOSINOPHIL # BLD: 0.29 K/UL (ref 0–0.44)
EOSINOPHILS RELATIVE PERCENT: 3 % (ref 1–4)
ERYTHROCYTE [DISTWIDTH] IN BLOOD BY AUTOMATED COUNT: 12.4 % (ref 11.8–14.4)
ERYTHROCYTE [SEDIMENTATION RATE] IN BLOOD BY PHOTOMETRIC METHOD: 1 MM/HR (ref 0–20)
EST. AVERAGE GLUCOSE BLD GHB EST-MCNC: 105 MG/DL
GFR, ESTIMATED: >90 ML/MIN/1.73M2
GLUCOSE SERPL-MCNC: 109 MG/DL (ref 74–99)
HBA1C MFR BLD: 5.3 % (ref 4–6)
HCT VFR BLD AUTO: 42.8 % (ref 40.7–50.3)
HDLC SERPL-MCNC: 30 MG/DL
HGB BLD-MCNC: 14.8 G/DL (ref 13–17)
IMM GRANULOCYTES # BLD AUTO: 0.05 K/UL (ref 0–0.3)
IMM GRANULOCYTES NFR BLD: 1 %
INSULIN COMMENT: NORMAL
INSULIN REFERENCE RANGE:: NORMAL
INSULIN: 17 MU/L
LDLC SERPL CALC-MCNC: 34 MG/DL (ref 0–100)
LYMPHOCYTES NFR BLD: 2.06 K/UL (ref 1.1–3.7)
LYMPHOCYTES RELATIVE PERCENT: 23 % (ref 24–43)
MCH RBC QN AUTO: 30.7 PG (ref 25.2–33.5)
MCHC RBC AUTO-ENTMCNC: 34.6 G/DL (ref 28.4–34.8)
MCV RBC AUTO: 88.8 FL (ref 82.6–102.9)
MONOCYTES NFR BLD: 0.7 K/UL (ref 0.1–1.2)
MONOCYTES NFR BLD: 8 % (ref 3–12)
NEUTROPHILS NFR BLD: 64 % (ref 36–65)
NEUTS SEG NFR BLD: 5.85 K/UL (ref 1.5–8.1)
NRBC BLD-RTO: 0 PER 100 WBC
PLATELET # BLD AUTO: 236 K/UL (ref 138–453)
PMV BLD AUTO: 9.2 FL (ref 8.1–13.5)
POTASSIUM SERPL-SCNC: 4.3 MMOL/L (ref 3.7–5.3)
PSA SERPL-MCNC: 3.6 NG/ML (ref 0–4)
RBC # BLD AUTO: 4.82 M/UL (ref 4.21–5.77)
SHBG SERPL-SCNC: 18 NMOL/L (ref 19–76)
SODIUM SERPL-SCNC: 141 MMOL/L (ref 136–145)
T4 FREE SERPL-MCNC: 1.2 NG/DL (ref 0.92–1.68)
TESTOST FREE MFR SERPL: 72.2 PG/ML (ref 47–244)
TESTOST SERPL-MCNC: 273 NG/DL (ref 193–740)
TRIGL SERPL-MCNC: 154 MG/DL
TSH SERPL DL<=0.05 MIU/L-ACNC: 1.69 UIU/ML (ref 0.27–4.2)
URATE SERPL-MCNC: 6.3 MG/DL (ref 3.4–7)
VIT B12 SERPL-MCNC: 455 PG/ML (ref 232–1245)
VLDLC SERPL CALC-MCNC: 31 MG/DL
WBC OTHER # BLD: 9 K/UL (ref 3.5–11.3)

## 2024-05-07 PROCEDURE — 85652 RBC SED RATE AUTOMATED: CPT

## 2024-05-07 PROCEDURE — 80061 LIPID PANEL: CPT

## 2024-05-07 PROCEDURE — 84550 ASSAY OF BLOOD/URIC ACID: CPT

## 2024-05-07 PROCEDURE — 84460 ALANINE AMINO (ALT) (SGPT): CPT

## 2024-05-07 PROCEDURE — 83036 HEMOGLOBIN GLYCOSYLATED A1C: CPT

## 2024-05-07 PROCEDURE — 82306 VITAMIN D 25 HYDROXY: CPT

## 2024-05-07 PROCEDURE — 84443 ASSAY THYROID STIM HORMONE: CPT

## 2024-05-07 PROCEDURE — 84270 ASSAY OF SEX HORMONE GLOBUL: CPT

## 2024-05-07 PROCEDURE — 82627 DEHYDROEPIANDROSTERONE: CPT

## 2024-05-07 PROCEDURE — 36415 COLL VENOUS BLD VENIPUNCTURE: CPT

## 2024-05-07 PROCEDURE — 86140 C-REACTIVE PROTEIN: CPT

## 2024-05-07 PROCEDURE — 82607 VITAMIN B-12: CPT

## 2024-05-07 PROCEDURE — 84403 ASSAY OF TOTAL TESTOSTERONE: CPT

## 2024-05-07 PROCEDURE — G0103 PSA SCREENING: HCPCS

## 2024-05-07 PROCEDURE — 80048 BASIC METABOLIC PNL TOTAL CA: CPT

## 2024-05-07 PROCEDURE — 85025 COMPLETE CBC W/AUTO DIFF WBC: CPT

## 2024-05-07 PROCEDURE — 84450 TRANSFERASE (AST) (SGOT): CPT

## 2024-05-07 PROCEDURE — 83525 ASSAY OF INSULIN: CPT

## 2024-05-07 PROCEDURE — 84439 ASSAY OF FREE THYROXINE: CPT

## 2024-06-15 ENCOUNTER — HOSPITAL ENCOUNTER (OUTPATIENT)
Dept: GENERAL RADIOLOGY | Age: 54
End: 2024-06-15
Payer: COMMERCIAL

## 2024-06-15 ENCOUNTER — HOSPITAL ENCOUNTER (OUTPATIENT)
Age: 54
End: 2024-06-15
Payer: COMMERCIAL

## 2024-06-15 DIAGNOSIS — M17.12 UNILATERAL PRIMARY OSTEOARTHRITIS, LEFT KNEE: ICD-10-CM

## 2024-06-15 PROCEDURE — 73564 X-RAY EXAM KNEE 4 OR MORE: CPT

## 2024-09-19 ENCOUNTER — HOSPITAL ENCOUNTER (OUTPATIENT)
Dept: PREADMISSION TESTING | Age: 54
Discharge: HOME | End: 2024-09-23
Payer: COMMERCIAL

## 2024-09-19 VITALS
SYSTOLIC BLOOD PRESSURE: 134 MMHG | OXYGEN SATURATION: 98 % | HEART RATE: 79 BPM | RESPIRATION RATE: 18 BRPM | WEIGHT: 275 LBS | TEMPERATURE: 97.8 F | BODY MASS INDEX: 33.49 KG/M2 | HEIGHT: 76 IN | DIASTOLIC BLOOD PRESSURE: 88 MMHG

## 2024-09-19 LAB
ABO + RH BLD: NORMAL
ANION GAP SERPL CALCULATED.3IONS-SCNC: 13 MMOL/L (ref 9–17)
ARM BAND NUMBER: NORMAL
BLOOD BANK SAMPLE EXPIRATION: NORMAL
BLOOD GROUP ANTIBODIES SERPL: NEGATIVE
BUN SERPL-MCNC: 19 MG/DL (ref 6–20)
BUN/CREAT SERPL: 21 (ref 9–20)
CALCIUM SERPL-MCNC: 9.4 MG/DL (ref 8.6–10.4)
CHLORIDE SERPL-SCNC: 102 MMOL/L (ref 98–107)
CO2 SERPL-SCNC: 24 MMOL/L (ref 20–31)
CREAT SERPL-MCNC: 0.9 MG/DL (ref 0.7–1.2)
EKG ATRIAL RATE: 72 BPM
EKG P AXIS: 9 DEGREES
EKG P-R INTERVAL: 168 MS
EKG Q-T INTERVAL: 408 MS
EKG QRS DURATION: 88 MS
EKG QTC CALCULATION (BAZETT): 446 MS
EKG R AXIS: 24 DEGREES
EKG T AXIS: 17 DEGREES
EKG VENTRICULAR RATE: 72 BPM
ERYTHROCYTE [DISTWIDTH] IN BLOOD BY AUTOMATED COUNT: 12.2 % (ref 11.8–14.4)
EST. AVERAGE GLUCOSE BLD GHB EST-MCNC: 108 MG/DL
GFR, ESTIMATED: >90 ML/MIN/1.73M2
GLUCOSE SERPL-MCNC: 105 MG/DL (ref 70–99)
HBA1C MFR BLD: 5.4 % (ref 4–6)
HCT VFR BLD AUTO: 41.2 % (ref 40.7–50.3)
HGB BLD-MCNC: 14.8 G/DL (ref 13–17)
MCH RBC QN AUTO: 31.7 PG (ref 25.2–33.5)
MCHC RBC AUTO-ENTMCNC: 35.9 G/DL (ref 28.4–34.8)
MCV RBC AUTO: 88.2 FL (ref 82.6–102.9)
NRBC BLD-RTO: 0 PER 100 WBC
PLATELET # BLD AUTO: 214 K/UL (ref 138–453)
PMV BLD AUTO: 8.8 FL (ref 8.1–13.5)
POTASSIUM SERPL-SCNC: 3.7 MMOL/L (ref 3.7–5.3)
RBC # BLD AUTO: 4.67 M/UL (ref 4.21–5.77)
SODIUM SERPL-SCNC: 139 MMOL/L (ref 135–144)
WBC OTHER # BLD: 8.2 K/UL (ref 3.5–11.3)

## 2024-09-19 PROCEDURE — 93005 ELECTROCARDIOGRAM TRACING: CPT | Performed by: ANESTHESIOLOGY

## 2024-09-19 PROCEDURE — 86901 BLOOD TYPING SEROLOGIC RH(D): CPT

## 2024-09-19 PROCEDURE — 87641 MR-STAPH DNA AMP PROBE: CPT

## 2024-09-19 PROCEDURE — 86850 RBC ANTIBODY SCREEN: CPT

## 2024-09-19 PROCEDURE — 86900 BLOOD TYPING SEROLOGIC ABO: CPT

## 2024-09-19 PROCEDURE — 85027 COMPLETE CBC AUTOMATED: CPT

## 2024-09-19 PROCEDURE — 80048 BASIC METABOLIC PNL TOTAL CA: CPT

## 2024-09-19 PROCEDURE — 36415 COLL VENOUS BLD VENIPUNCTURE: CPT

## 2024-09-19 PROCEDURE — 83036 HEMOGLOBIN GLYCOSYLATED A1C: CPT

## 2024-09-19 RX ORDER — LANOLIN ALCOHOL/MO/W.PET/CERES
400 CREAM (GRAM) TOPICAL DAILY
COMMUNITY

## 2024-09-19 RX ORDER — VITAMIN B COMPLEX
1 TABLET ORAL DAILY
COMMUNITY

## 2024-09-19 RX ORDER — ASPIRIN 81 MG/1
81 TABLET ORAL DAILY
COMMUNITY

## 2024-09-19 RX ORDER — ANTIARTHRITIC COMBINATION NO.2 900 MG
1 TABLET ORAL DAILY
COMMUNITY

## 2024-09-19 ASSESSMENT — PAIN DESCRIPTION - FREQUENCY: FREQUENCY: CONTINUOUS

## 2024-09-19 ASSESSMENT — PAIN DESCRIPTION - LOCATION: LOCATION: KNEE

## 2024-09-19 ASSESSMENT — PAIN DESCRIPTION - ORIENTATION: ORIENTATION: LEFT

## 2024-09-19 ASSESSMENT — PAIN SCALES - GENERAL: PAINLEVEL_OUTOF10: 8

## 2024-09-19 ASSESSMENT — PAIN DESCRIPTION - PAIN TYPE: TYPE: CHRONIC PAIN

## 2024-09-20 LAB
MRSA, DNA, NASAL: NEGATIVE
SPECIMEN DESCRIPTION: NORMAL

## 2024-10-01 ENCOUNTER — ANESTHESIA (OUTPATIENT)
Dept: OPERATING ROOM | Age: 54
End: 2024-10-01
Payer: COMMERCIAL

## 2024-10-01 ENCOUNTER — HOSPITAL ENCOUNTER (OUTPATIENT)
Age: 54
Setting detail: OUTPATIENT SURGERY
Discharge: HOME | End: 2024-10-01
Attending: ORTHOPAEDIC SURGERY | Admitting: ORTHOPAEDIC SURGERY
Payer: COMMERCIAL

## 2024-10-01 ENCOUNTER — ANESTHESIA EVENT (OUTPATIENT)
Dept: OPERATING ROOM | Age: 54
End: 2024-10-01
Payer: COMMERCIAL

## 2024-10-01 VITALS
RESPIRATION RATE: 9 BRPM | DIASTOLIC BLOOD PRESSURE: 78 MMHG | WEIGHT: 275 LBS | BODY MASS INDEX: 33.49 KG/M2 | TEMPERATURE: 97.7 F | OXYGEN SATURATION: 92 % | HEIGHT: 76 IN | SYSTOLIC BLOOD PRESSURE: 134 MMHG | HEART RATE: 76 BPM

## 2024-10-01 DIAGNOSIS — G89.18 ACUTE POSTOPERATIVE PAIN: Primary | ICD-10-CM

## 2024-10-01 DIAGNOSIS — M17.12 PRIMARY OSTEOARTHRITIS OF LEFT KNEE: ICD-10-CM

## 2024-10-01 LAB
GLUCOSE BLD-MCNC: 119 MG/DL (ref 75–110)
GLUCOSE BLD-MCNC: 140 MG/DL (ref 75–110)

## 2024-10-01 PROCEDURE — 3600000015 HC SURGERY LEVEL 5 ADDTL 15MIN: Performed by: ORTHOPAEDIC SURGERY

## 2024-10-01 PROCEDURE — 3700000001 HC ADD 15 MINUTES (ANESTHESIA): Performed by: ORTHOPAEDIC SURGERY

## 2024-10-01 PROCEDURE — C1713 ANCHOR/SCREW BN/BN,TIS/BN: HCPCS | Performed by: ORTHOPAEDIC SURGERY

## 2024-10-01 PROCEDURE — 7100000000 HC PACU RECOVERY - FIRST 15 MIN: Performed by: ORTHOPAEDIC SURGERY

## 2024-10-01 PROCEDURE — 2580000003 HC RX 258

## 2024-10-01 PROCEDURE — 88305 TISSUE EXAM BY PATHOLOGIST: CPT

## 2024-10-01 PROCEDURE — 3700000000 HC ANESTHESIA ATTENDED CARE: Performed by: ORTHOPAEDIC SURGERY

## 2024-10-01 PROCEDURE — 7100000010 HC PHASE II RECOVERY - FIRST 15 MIN: Performed by: ORTHOPAEDIC SURGERY

## 2024-10-01 PROCEDURE — 82947 ASSAY GLUCOSE BLOOD QUANT: CPT

## 2024-10-01 PROCEDURE — 6360000002 HC RX W HCPCS: Performed by: SPECIALIST

## 2024-10-01 PROCEDURE — 3600000005 HC SURGERY LEVEL 5 BASE: Performed by: ORTHOPAEDIC SURGERY

## 2024-10-01 PROCEDURE — 6360000002 HC RX W HCPCS: Performed by: ORTHOPAEDIC SURGERY

## 2024-10-01 PROCEDURE — 2580000003 HC RX 258: Performed by: ORTHOPAEDIC SURGERY

## 2024-10-01 PROCEDURE — 97110 THERAPEUTIC EXERCISES: CPT

## 2024-10-01 PROCEDURE — C1776 JOINT DEVICE (IMPLANTABLE): HCPCS | Performed by: ORTHOPAEDIC SURGERY

## 2024-10-01 PROCEDURE — 7100000001 HC PACU RECOVERY - ADDTL 15 MIN: Performed by: ORTHOPAEDIC SURGERY

## 2024-10-01 PROCEDURE — 97530 THERAPEUTIC ACTIVITIES: CPT

## 2024-10-01 PROCEDURE — 6370000000 HC RX 637 (ALT 250 FOR IP)

## 2024-10-01 PROCEDURE — 6370000000 HC RX 637 (ALT 250 FOR IP): Performed by: ANESTHESIOLOGY

## 2024-10-01 PROCEDURE — 2500000003 HC RX 250 WO HCPCS: Performed by: SPECIALIST

## 2024-10-01 PROCEDURE — 97162 PT EVAL MOD COMPLEX 30 MIN: CPT

## 2024-10-01 PROCEDURE — 97116 GAIT TRAINING THERAPY: CPT

## 2024-10-01 PROCEDURE — 97535 SELF CARE MNGMENT TRAINING: CPT

## 2024-10-01 PROCEDURE — 7100000011 HC PHASE II RECOVERY - ADDTL 15 MIN: Performed by: ORTHOPAEDIC SURGERY

## 2024-10-01 PROCEDURE — 97166 OT EVAL MOD COMPLEX 45 MIN: CPT

## 2024-10-01 PROCEDURE — 2709999900 HC NON-CHARGEABLE SUPPLY: Performed by: ORTHOPAEDIC SURGERY

## 2024-10-01 DEVICE — IMPLANTABLE DEVICE
Type: IMPLANTABLE DEVICE | Site: KNEE | Status: FUNCTIONAL
Brand: PERSONA®

## 2024-10-01 DEVICE — IMPLANTABLE DEVICE
Type: IMPLANTABLE DEVICE | Site: KNEE | Status: FUNCTIONAL
Brand: BIOMET® BONE CEMENT R

## 2024-10-01 DEVICE — DUP USE 339820 IMPL KNEE PSN TIB STM 5 DEG SZ G L: Type: IMPLANTABLE DEVICE | Site: KNEE | Status: FUNCTIONAL

## 2024-10-01 DEVICE — IMPLANTABLE DEVICE
Type: IMPLANTABLE DEVICE | Site: KNEE | Status: FUNCTIONAL
Brand: PERSONA® VIVACIT-E®

## 2024-10-01 RX ORDER — ROSUVASTATIN CALCIUM 10 MG/1
10 TABLET, COATED ORAL DAILY
Status: CANCELLED | OUTPATIENT
Start: 2024-10-01

## 2024-10-01 RX ORDER — ASPIRIN 325 MG
325 TABLET ORAL 2 TIMES DAILY
Qty: 24 TABLET | Refills: 0 | Status: SHIPPED | OUTPATIENT
Start: 2024-10-01 | End: 2024-10-13

## 2024-10-01 RX ORDER — SODIUM CHLORIDE, SODIUM LACTATE, POTASSIUM CHLORIDE, CALCIUM CHLORIDE 600; 310; 30; 20 MG/100ML; MG/100ML; MG/100ML; MG/100ML
INJECTION, SOLUTION INTRAVENOUS CONTINUOUS
Status: DISCONTINUED | OUTPATIENT
Start: 2024-10-01 | End: 2024-10-01 | Stop reason: HOSPADM

## 2024-10-01 RX ORDER — ACETAMINOPHEN 500 MG
1000 TABLET ORAL 3 TIMES DAILY PRN
Qty: 84 TABLET | Refills: 0 | Status: SHIPPED | OUTPATIENT
Start: 2024-10-01 | End: 2024-10-15

## 2024-10-01 RX ORDER — OXYCODONE HYDROCHLORIDE 5 MG/1
5 TABLET ORAL EVERY 6 HOURS PRN
Qty: 28 TABLET | Refills: 0 | Status: SHIPPED | OUTPATIENT
Start: 2024-10-01 | End: 2024-10-08

## 2024-10-01 RX ORDER — CELECOXIB 200 MG/1
200 CAPSULE ORAL ONCE
Status: COMPLETED | OUTPATIENT
Start: 2024-10-01 | End: 2024-10-01

## 2024-10-01 RX ORDER — HYDROMORPHONE HYDROCHLORIDE 1 MG/ML
0.25 INJECTION, SOLUTION INTRAMUSCULAR; INTRAVENOUS; SUBCUTANEOUS EVERY 5 MIN PRN
Status: DISCONTINUED | OUTPATIENT
Start: 2024-10-01 | End: 2024-10-01 | Stop reason: HOSPADM

## 2024-10-01 RX ORDER — GABAPENTIN 100 MG/1
100 CAPSULE ORAL 3 TIMES DAILY
Qty: 21 CAPSULE | Refills: 0 | Status: SHIPPED | OUTPATIENT
Start: 2024-10-01 | End: 2024-10-08

## 2024-10-01 RX ORDER — LIDOCAINE HYDROCHLORIDE 20 MG/ML
INJECTION, SOLUTION EPIDURAL; INFILTRATION; INTRACAUDAL; PERINEURAL
Status: DISCONTINUED | OUTPATIENT
Start: 2024-10-01 | End: 2024-10-01 | Stop reason: SDUPTHER

## 2024-10-01 RX ORDER — SODIUM CHLORIDE 0.9 % (FLUSH) 0.9 %
5-40 SYRINGE (ML) INJECTION EVERY 12 HOURS SCHEDULED
Status: CANCELLED | OUTPATIENT
Start: 2024-10-01

## 2024-10-01 RX ORDER — LIDOCAINE HYDROCHLORIDE 10 MG/ML
1 INJECTION, SOLUTION EPIDURAL; INFILTRATION; INTRACAUDAL; PERINEURAL
Status: DISCONTINUED | OUTPATIENT
Start: 2024-10-02 | End: 2024-10-01 | Stop reason: HOSPADM

## 2024-10-01 RX ORDER — KETOROLAC TROMETHAMINE 30 MG/ML
30 INJECTION, SOLUTION INTRAMUSCULAR; INTRAVENOUS EVERY 6 HOURS
Status: DISCONTINUED | OUTPATIENT
Start: 2024-10-01 | End: 2024-10-01 | Stop reason: HOSPADM

## 2024-10-01 RX ORDER — AMLODIPINE BESYLATE 10 MG/1
10 TABLET ORAL DAILY
Status: CANCELLED | OUTPATIENT
Start: 2024-10-01

## 2024-10-01 RX ORDER — METFORMIN HCL 500 MG
500 TABLET, EXTENDED RELEASE 24 HR ORAL
Status: CANCELLED | OUTPATIENT
Start: 2024-10-01

## 2024-10-01 RX ORDER — DOCUSATE SODIUM 100 MG/1
100 CAPSULE, LIQUID FILLED ORAL 2 TIMES DAILY
Qty: 30 CAPSULE | Refills: 0 | Status: SHIPPED | OUTPATIENT
Start: 2024-10-01

## 2024-10-01 RX ORDER — SODIUM CHLORIDE 0.9 % (FLUSH) 0.9 %
5-40 SYRINGE (ML) INJECTION PRN
Status: CANCELLED | OUTPATIENT
Start: 2024-10-01

## 2024-10-01 RX ORDER — OXYCODONE HYDROCHLORIDE 5 MG/1
10 TABLET ORAL EVERY 4 HOURS PRN
Status: CANCELLED | OUTPATIENT
Start: 2024-10-01

## 2024-10-01 RX ORDER — BISACODYL 5 MG/1
5 TABLET, DELAYED RELEASE ORAL DAILY
Status: CANCELLED | OUTPATIENT
Start: 2024-10-01

## 2024-10-01 RX ORDER — OXYCODONE HYDROCHLORIDE 5 MG/1
5 TABLET ORAL EVERY 4 HOURS PRN
Status: CANCELLED | OUTPATIENT
Start: 2024-10-01

## 2024-10-01 RX ORDER — ROCURONIUM BROMIDE 10 MG/ML
INJECTION, SOLUTION INTRAVENOUS
Status: DISCONTINUED | OUTPATIENT
Start: 2024-10-01 | End: 2024-10-01 | Stop reason: SDUPTHER

## 2024-10-01 RX ORDER — PROCHLORPERAZINE EDISYLATE 5 MG/ML
5 INJECTION INTRAMUSCULAR; INTRAVENOUS
Status: DISCONTINUED | OUTPATIENT
Start: 2024-10-01 | End: 2024-10-01 | Stop reason: HOSPADM

## 2024-10-01 RX ORDER — SODIUM CHLORIDE 9 MG/ML
INJECTION, SOLUTION INTRAVENOUS CONTINUOUS
Status: DISCONTINUED | OUTPATIENT
Start: 2024-10-01 | End: 2024-10-01

## 2024-10-01 RX ORDER — OXYCODONE HYDROCHLORIDE 5 MG/1
TABLET ORAL
Status: COMPLETED
Start: 2024-10-01 | End: 2024-10-01

## 2024-10-01 RX ORDER — SODIUM CHLORIDE 0.9 % (FLUSH) 0.9 %
5-40 SYRINGE (ML) INJECTION EVERY 12 HOURS SCHEDULED
Status: DISCONTINUED | OUTPATIENT
Start: 2024-10-01 | End: 2024-10-01 | Stop reason: HOSPADM

## 2024-10-01 RX ORDER — DEXAMETHASONE SODIUM PHOSPHATE 10 MG/ML
INJECTION, SOLUTION INTRAMUSCULAR; INTRAVENOUS
Status: DISCONTINUED | OUTPATIENT
Start: 2024-10-01 | End: 2024-10-01 | Stop reason: SDUPTHER

## 2024-10-01 RX ORDER — MIDAZOLAM HYDROCHLORIDE 1 MG/ML
INJECTION INTRAMUSCULAR; INTRAVENOUS
Status: DISCONTINUED | OUTPATIENT
Start: 2024-10-01 | End: 2024-10-01 | Stop reason: SDUPTHER

## 2024-10-01 RX ORDER — ONDANSETRON 4 MG/1
4 TABLET, ORALLY DISINTEGRATING ORAL EVERY 8 HOURS PRN
Status: CANCELLED | OUTPATIENT
Start: 2024-10-01

## 2024-10-01 RX ORDER — ACETAMINOPHEN 325 MG/1
650 TABLET ORAL EVERY 6 HOURS
Status: CANCELLED | OUTPATIENT
Start: 2024-10-01

## 2024-10-01 RX ORDER — GABAPENTIN 300 MG/1
300 CAPSULE ORAL ONCE
Status: COMPLETED | OUTPATIENT
Start: 2024-10-01 | End: 2024-10-01

## 2024-10-01 RX ORDER — ONDANSETRON 2 MG/ML
4 INJECTION INTRAMUSCULAR; INTRAVENOUS EVERY 6 HOURS PRN
Status: CANCELLED | OUTPATIENT
Start: 2024-10-01

## 2024-10-01 RX ORDER — IBUPROFEN 600 MG/1
600 TABLET, FILM COATED ORAL 3 TIMES DAILY PRN
Qty: 30 TABLET | Refills: 0 | Status: SHIPPED | OUTPATIENT
Start: 2024-10-01

## 2024-10-01 RX ORDER — ONDANSETRON 4 MG/1
4 TABLET, FILM COATED ORAL EVERY 6 HOURS PRN
Qty: 30 TABLET | Refills: 1 | Status: SHIPPED | OUTPATIENT
Start: 2024-10-01

## 2024-10-01 RX ORDER — ACETAMINOPHEN 500 MG
1000 TABLET ORAL ONCE
Status: COMPLETED | OUTPATIENT
Start: 2024-10-01 | End: 2024-10-01

## 2024-10-01 RX ORDER — NALOXONE HYDROCHLORIDE 0.4 MG/ML
INJECTION, SOLUTION INTRAMUSCULAR; INTRAVENOUS; SUBCUTANEOUS PRN
Status: DISCONTINUED | OUTPATIENT
Start: 2024-10-01 | End: 2024-10-01 | Stop reason: HOSPADM

## 2024-10-01 RX ORDER — SODIUM CHLORIDE 9 MG/ML
INJECTION, SOLUTION INTRAVENOUS PRN
Status: CANCELLED | OUTPATIENT
Start: 2024-10-01

## 2024-10-01 RX ORDER — TRANEXAMIC ACID 100 MG/ML
INJECTION, SOLUTION INTRAVENOUS
Status: DISCONTINUED | OUTPATIENT
Start: 2024-10-01 | End: 2024-10-01 | Stop reason: SDUPTHER

## 2024-10-01 RX ORDER — ONDANSETRON 2 MG/ML
INJECTION INTRAMUSCULAR; INTRAVENOUS
Status: DISCONTINUED | OUTPATIENT
Start: 2024-10-01 | End: 2024-10-01 | Stop reason: SDUPTHER

## 2024-10-01 RX ORDER — HYDROMORPHONE HYDROCHLORIDE 1 MG/ML
0.5 INJECTION, SOLUTION INTRAMUSCULAR; INTRAVENOUS; SUBCUTANEOUS EVERY 5 MIN PRN
Status: DISCONTINUED | OUTPATIENT
Start: 2024-10-01 | End: 2024-10-01 | Stop reason: HOSPADM

## 2024-10-01 RX ORDER — OXYCODONE HYDROCHLORIDE 5 MG/1
5 TABLET ORAL
Status: COMPLETED | OUTPATIENT
Start: 2024-10-01 | End: 2024-10-01

## 2024-10-01 RX ORDER — SODIUM CHLORIDE 9 MG/ML
INJECTION, SOLUTION INTRAVENOUS CONTINUOUS
Status: CANCELLED | OUTPATIENT
Start: 2024-10-01

## 2024-10-01 RX ORDER — LANOLIN ALCOHOL/MO/W.PET/CERES
400 CREAM (GRAM) TOPICAL DAILY
Status: CANCELLED | OUTPATIENT
Start: 2024-10-01

## 2024-10-01 RX ORDER — SODIUM CHLORIDE 0.9 % (FLUSH) 0.9 %
5-40 SYRINGE (ML) INJECTION PRN
Status: DISCONTINUED | OUTPATIENT
Start: 2024-10-01 | End: 2024-10-01 | Stop reason: HOSPADM

## 2024-10-01 RX ORDER — CYCLOBENZAPRINE HCL 10 MG
10 TABLET ORAL EVERY 12 HOURS PRN
Status: CANCELLED | OUTPATIENT
Start: 2024-10-01

## 2024-10-01 RX ORDER — ASPIRIN 325 MG
325 TABLET, DELAYED RELEASE (ENTERIC COATED) ORAL 2 TIMES DAILY
Status: CANCELLED | OUTPATIENT
Start: 2024-10-01

## 2024-10-01 RX ORDER — ONDANSETRON 2 MG/ML
4 INJECTION INTRAMUSCULAR; INTRAVENOUS
Status: DISCONTINUED | OUTPATIENT
Start: 2024-10-01 | End: 2024-10-01 | Stop reason: HOSPADM

## 2024-10-01 RX ORDER — PROPOFOL 10 MG/ML
INJECTION, EMULSION INTRAVENOUS
Status: DISCONTINUED | OUTPATIENT
Start: 2024-10-01 | End: 2024-10-01 | Stop reason: SDUPTHER

## 2024-10-01 RX ORDER — HYDROCHLOROTHIAZIDE 12.5 MG/1
12.5 TABLET ORAL DAILY
Status: CANCELLED | OUTPATIENT
Start: 2024-10-01

## 2024-10-01 RX ORDER — VANCOMYCIN HYDROCHLORIDE 1 G/20ML
INJECTION, POWDER, LYOPHILIZED, FOR SOLUTION INTRAVENOUS PRN
Status: DISCONTINUED | OUTPATIENT
Start: 2024-10-01 | End: 2024-10-01 | Stop reason: ALTCHOICE

## 2024-10-01 RX ORDER — HYDROXYZINE HYDROCHLORIDE 10 MG/1
10 TABLET, FILM COATED ORAL EVERY 8 HOURS PRN
Status: CANCELLED | OUTPATIENT
Start: 2024-10-01

## 2024-10-01 RX ORDER — FENTANYL CITRATE 50 UG/ML
INJECTION, SOLUTION INTRAMUSCULAR; INTRAVENOUS
Status: DISCONTINUED | OUTPATIENT
Start: 2024-10-01 | End: 2024-10-01 | Stop reason: SDUPTHER

## 2024-10-01 RX ORDER — LABETALOL 200 MG/1
200 TABLET, FILM COATED ORAL 2 TIMES DAILY
Status: CANCELLED | OUTPATIENT
Start: 2024-10-01

## 2024-10-01 RX ORDER — POTASSIUM CHLORIDE 1500 MG/1
10 TABLET, EXTENDED RELEASE ORAL DAILY
Status: CANCELLED | OUTPATIENT
Start: 2024-10-01

## 2024-10-01 RX ORDER — SODIUM CHLORIDE 9 MG/ML
INJECTION, SOLUTION INTRAVENOUS PRN
Status: DISCONTINUED | OUTPATIENT
Start: 2024-10-01 | End: 2024-10-01 | Stop reason: HOSPADM

## 2024-10-01 RX ADMIN — FENTANYL CITRATE 50 MCG: 50 INJECTION, SOLUTION INTRAMUSCULAR; INTRAVENOUS at 08:31

## 2024-10-01 RX ADMIN — SODIUM CHLORIDE, POTASSIUM CHLORIDE, SODIUM LACTATE AND CALCIUM CHLORIDE: 600; 310; 30; 20 INJECTION, SOLUTION INTRAVENOUS at 08:13

## 2024-10-01 RX ADMIN — LIDOCAINE HYDROCHLORIDE 80 MG: 20 INJECTION, SOLUTION EPIDURAL; INFILTRATION; INTRACAUDAL; PERINEURAL at 07:22

## 2024-10-01 RX ADMIN — FENTANYL CITRATE 100 MCG: 50 INJECTION, SOLUTION INTRAMUSCULAR; INTRAVENOUS at 07:22

## 2024-10-01 RX ADMIN — PROPOFOL 30 MCG/KG/MIN: 10 INJECTION, EMULSION INTRAVENOUS at 08:00

## 2024-10-01 RX ADMIN — GABAPENTIN 300 MG: 300 CAPSULE ORAL at 07:13

## 2024-10-01 RX ADMIN — DEXAMETHASONE SODIUM PHOSPHATE 10 MG: 10 INJECTION INTRAMUSCULAR; INTRAVENOUS at 07:30

## 2024-10-01 RX ADMIN — ACETAMINOPHEN 1000 MG: 500 TABLET ORAL at 07:13

## 2024-10-01 RX ADMIN — FENTANYL CITRATE 50 MCG: 50 INJECTION, SOLUTION INTRAMUSCULAR; INTRAVENOUS at 08:00

## 2024-10-01 RX ADMIN — KETOROLAC TROMETHAMINE 30 MG: 30 INJECTION, SOLUTION INTRAMUSCULAR at 11:01

## 2024-10-01 RX ADMIN — Medication 20 MG: at 08:15

## 2024-10-01 RX ADMIN — ONDANSETRON 4 MG: 2 INJECTION, SOLUTION INTRAMUSCULAR; INTRAVENOUS at 09:24

## 2024-10-01 RX ADMIN — ROCURONIUM BROMIDE 5 MG: 10 INJECTION, SOLUTION INTRAVENOUS at 08:02

## 2024-10-01 RX ADMIN — TRANEXAMIC ACID 1000 MG: 100 INJECTION, SOLUTION INTRAVENOUS at 07:52

## 2024-10-01 RX ADMIN — SODIUM CHLORIDE, POTASSIUM CHLORIDE, SODIUM LACTATE AND CALCIUM CHLORIDE: 600; 310; 30; 20 INJECTION, SOLUTION INTRAVENOUS at 06:38

## 2024-10-01 RX ADMIN — OXYCODONE HYDROCHLORIDE 5 MG: 5 TABLET ORAL at 12:37

## 2024-10-01 RX ADMIN — SODIUM CHLORIDE 3000 MG: 9 INJECTION, SOLUTION INTRAVENOUS at 07:34

## 2024-10-01 RX ADMIN — PROPOFOL 70 MG: 10 INJECTION, EMULSION INTRAVENOUS at 07:24

## 2024-10-01 RX ADMIN — TRANEXAMIC ACID 1000 MG: 100 INJECTION, SOLUTION INTRAVENOUS at 09:24

## 2024-10-01 RX ADMIN — FENTANYL CITRATE 50 MCG: 50 INJECTION, SOLUTION INTRAMUSCULAR; INTRAVENOUS at 07:50

## 2024-10-01 RX ADMIN — PROPOFOL 200 MG: 10 INJECTION, EMULSION INTRAVENOUS at 07:22

## 2024-10-01 RX ADMIN — MIDAZOLAM 2 MG: 1 INJECTION INTRAMUSCULAR; INTRAVENOUS at 07:19

## 2024-10-01 RX ADMIN — CELECOXIB 200 MG: 200 CAPSULE ORAL at 07:13

## 2024-10-01 ASSESSMENT — PAIN DESCRIPTION - ORIENTATION
ORIENTATION: LEFT

## 2024-10-01 ASSESSMENT — PAIN DESCRIPTION - LOCATION
LOCATION: KNEE

## 2024-10-01 ASSESSMENT — PAIN DESCRIPTION - DESCRIPTORS
DESCRIPTORS: ACHING
DESCRIPTORS: STABBING;SHARP
DESCRIPTORS: THROBBING
DESCRIPTORS: THROBBING

## 2024-10-01 ASSESSMENT — ENCOUNTER SYMPTOMS: SHORTNESS OF BREATH: 0

## 2024-10-01 ASSESSMENT — PAIN SCALES - GENERAL
PAINLEVEL_OUTOF10: 3
PAINLEVEL_OUTOF10: 7
PAINLEVEL_OUTOF10: 5
PAINLEVEL_OUTOF10: 6
PAINLEVEL_OUTOF10: 7
PAINLEVEL_OUTOF10: 6
PAINLEVEL_OUTOF10: 6

## 2024-10-01 ASSESSMENT — PAIN DESCRIPTION - PAIN TYPE: TYPE: SURGICAL PAIN

## 2024-10-01 ASSESSMENT — PAIN - FUNCTIONAL ASSESSMENT: PAIN_FUNCTIONAL_ASSESSMENT: 0-10

## 2024-10-01 NOTE — DISCHARGE INSTR - COC
Continuity of Care Form    Patient Name: Oli Cloud   :  1970  MRN:  [id number]    Admit date:  10/1/2024  Discharge date:  ***    Code Status Order: Prior   Advance Directives:   Advance Care Flowsheet Documentation        Date/Time Healthcare Directive Type of Healthcare Directive Copy in Chart Healthcare Agent Appointed Healthcare Agent's Name Healthcare Agent's Phone Number    10/01/24 0633 No, patient does not have an advance directive for healthcare treatment  --  --  --  --  --                     Admitting Physician:  Greyson Rodriguez MD  PCP: Shiraz Guaman MD    Discharging Nurse: ***  Discharging Hospital Unit/Room#: [facility] OR Pool/NONE  Discharging Unit Phone Number: ***    Emergency Contact:   Extended Emergency Contact Information  Primary Emergency Contact: AiLuís   [facility]  Home Phone: [phone]  Mobile Phone: [phone]  Relation: Spouse    Past Surgical History:  Past Surgical History:   Procedure Laterality Date    ABLATION OF DYSRHYTHMIC FOCUS  2019    Afib abation    CARDIOVERSION  2019    CARDIOVERSION  2019    by Dr. Marlow. 1 shock @ 200 converted to NSR    COLONOSCOPY      INSERTABLE CARDIAC MONITOR  2019    Loop recorder in place (10/20/22)    KNEE ARTHROSCOPY Bilateral     MOHS SURGERY      1st stage head and neck    SEPTOPLASTY Bilateral 2021    SEPTOPLASTY BILATERAL TURBINATE REDUCTION performed by Keshawn Qiu MD at [facility] OR    SKIN BIOPSY      TOTAL KNEE ARTHROPLASTY Right 11/10/2022    RIGHT KNEE TOTAL ARTHROPLASTY - NITHYA BIOMET performed by Greyson Rodriguez MD at [facility] OR    TRANSESOPHAGEAL ECHOCARDIOGRAM  2019    VEIN SURGERY Left        Immunization History:   Immunization History   Administered Date(s) Administered    Influenza Virus Vaccine 2019    Influenza, FLUARIX, FLULAVAL, FLUZONE (age 6 mo+) and AFLURIA, (age 3 y+), Quadv PF, 0.5mL 10/24/2022    TDaP, ADACEL (age 10y-64y), BOOSTRIX (age  10y+), IM, 0.5mL 11/15/2017       Active Problems:  Patient Active Problem List   Diagnosis Code    Hyperlipidemia E78.5    Atrial fibrillation with rapid ventricular response (HCC) I48.91    Atrial fibrillation with RVR ([facility]) I48.91    Essential hypertension I10    Hypotension due to drugs I95.2    S/P ablation of atrial fibrillation Z98.890, Z86.79    Elevated triglycerides with high cholesterol E78.2    Hypokalemia E87.6    Liver enzyme elevation R74.8    Vitamin D deficiency E55.9    Type 2 diabetes mellitus without complication, with long-term current use of insulin (HCC) E11.9, Z79.4    Localized osteoarthritis of right knee M17.11       Isolation/Infection:   Isolation            No Isolation          Patient Infection Status       None to display            Nurse Assessment:  Last Vital Signs: BP (!) 140/83   Pulse 74   Temp 97.5 °F (36.4 °C)   Resp 18   Ht 1.93 m (6' 4\")   Wt 124.7 kg (275 lb)   SpO2 95%   BMI 33.47 kg/m²     Last documented pain score (0-10 scale): Pain Level: 4  Last Weight:   Wt Readings from Last 1 Encounters:   10/01/24 124.7 kg (275 lb)     Mental Status:  {IP PT MENTAL STATUS:20030}    IV Access:  { PILAR IV ACCESS:997695011}    Nursing Mobility/ADLs:  Walking   {CHP DME ADLs:377560626}  Transfer  {CHP DME ADLs:992534451}  Bathing  {CHP DME ADLs:266568037}  Dressing  {CHP DME ADLs:948172607}  Toileting  {CHP DME ADLs:968224409}  Feeding  {CHP DME ADLs:744486611}  Med Admin  {CHP DME ADLs:080941582}  Med Delivery   { PILAR MED Delivery:880884895}    Wound Care Documentation and Therapy:        Elimination:  Continence:   Bowel: {YES / NO:19727}  Bladder: {YES / NO:19727}  Urinary Catheter: {Urinary Catheter:062575118}   Colostomy/Ileostomy/Ileal Conduit: {YES / NO:19727}       Date of Last BM: ***  No intake or output data in the 24 hours ending 10/01/24 0710  No intake/output data recorded.    Safety Concerns:     { PILAR Safety Concerns:533394185}    Impairments/Disabilities:

## 2024-10-01 NOTE — ANESTHESIA POSTPROCEDURE EVALUATION
Department of Anesthesiology  Postprocedure Note    Patient: Oli Cloud  MRN: [id number]  YOB: 1970  Date of evaluation: 10/1/2024    Procedure Summary       Date: 10/01/24 Room / Location: [address]    Anesthesia Start: 0719 Anesthesia Stop: 1004    Procedure: LEFT KNEE TOTAL ARTHROPLASTY (Left: Knee) Diagnosis:       Primary osteoarthritis of left knee      (Primary osteoarthritis of left knee [M17.12])    Surgeons: Greyson Rodriguez MD Responsible Provider: Cristo Duvall MD    Anesthesia Type: General ASA Status: 3            Anesthesia Type: General    Adeline Phase I: Adeline Score: 8    Adeline Phase II: Adeline Score: 10    Anesthesia Post Evaluation    Airway patency: patent  Cardiovascular status: hemodynamically stable  Respiratory status: acceptable    No notable events documented.

## 2024-10-01 NOTE — PROGRESS NOTES
Patient worked with therapy, they stated patient is good to be discharged. Discharge instructions reviewed with patient and patients wife, verbalized understanding. Patient sent home with ash hose and CHG soap. Patient was able to void prior to discharge and tolerated oral food/ drink.

## 2024-10-01 NOTE — PROGRESS NOTES
Occupational Therapy  Facility/Department: [facility] OR  Occupational Therapy Initial Assessment    Name: Oli Cloud  : 1970  MRN: [id number]  Date of Service: 10/1/2024    RN reports patient is medically stable for therapy treatment this date.    Chart reviewed prior to treatment and patient is agreeable for therapy.  All lines intact and patient positioned comfortably at end of treatment.  All patient needs addressed prior to ending therapy session.         Discharge Recommendations:  Patient would benefit from continued therapy after discharge  OT Equipment Recommendations  Equipment Needed: Yes  Mobility Devices: ADL Assistive Devices  ADL Assistive Devices: Long-handled Shoe Horn;Long-handled Sponge;Emergency Alert System;Toileting - 3-in-1 Commode;Grab Bars - shower       Patient Diagnosis(es): The primary encounter diagnosis was Acute postoperative pain. A diagnosis of Primary osteoarthritis of left knee was also pertinent to this visit.  Past Medical History:  has a past medical history of Arthritis, Atrial fibrillation (HCC), COVID-19, Diabetes mellitus (HCC), Hyperlipidemia, Hypertension, Neuropathy, Skin cancer, and Sleep apnea.  Past Surgical History:  has a past surgical history that includes transesophageal echocardiogram (2019); Cardioversion (2019); Mohs surgery; Cardioversion (2019); ablation of dysrhythmic focus (2019); Colonoscopy; skin biopsy; Vein Surgery (Left); Knee arthroscopy (Bilateral); Septoplasty (Bilateral, 2021); Insertable Cardiac Monitor (); and Total knee arthroplasty (Right, 11/10/2022).      Pt is s/p:   Date:  10/1/2024              Surgeon: Surgeon(s):  Greyson Rodriguez MD     Procedure: Procedure(s):  LEFT KNEE TOTAL ARTHROPLASTY      Assessment  Performance deficits / Impairments: Decreased functional mobility ;Decreased safe awareness;Decreased balance;Decreased ADL status;Decreased endurance;Decreased posture;Decreased  with RW, toilet to nurses station with RW and back to recliner with B shaunathes; MOD cues/tactile assist for pacing, scanning, upright posture/weight shifting, staying inside AD/keeping close to body, AD/LLE and RLE sequence, extending BUE's on AD/WB and pushing through for increased support (crutches are not proper fitting to pt and pt/spouse were both edu on need for new ones-PT to inform ortho navigator)     *pt was edu on medicated soap and proper body parts to use with and not to use on with good understanding         Vision  Vision: Impaired  Vision Exceptions:  (wears glasses for driving; denies visual changes)  Hearing  Hearing: Within functional limits  Cognition  Overall Cognitive Status: Exceptions  Arousal/Alertness: Appears intact  Following Commands: Appears intact  Attention Span: Appears intact  Memory: Decreased short term memory  Safety Judgement: Decreased awareness of need for safety  Problem Solving: Assistance required to correct errors made;Decreased awareness of errors;Assistance required to identify errors made  Insights: Decreased awareness of deficits  Initiation: Does not require cues  Sequencing: Does not require cues  Orientation  Overall Orientation Status: Within Functional Limits  Orientation Level: Oriented X4  Perception  Overall Perceptual Status: WFL               Education Given To: Patient;Family  Education Provided: Role of Therapy;Plan of Care;Transfer Training;Energy Conservation;Equipment;ADL Adaptive Strategies;Mobility Training;Precautions;Fall Prevention Strategies;Family Education  Education Provided Comments: safety in function, MD orders, call light use, benefits of being up/recommendations for continued therapy, compensatory tech/AE use with self care, pursed lip beathing, proper bed mob tech, postural control and weight shifting, ash hose wear and care schedule/recommendations for EZ slide use, use of medicated soap  Education Method: Demonstration;Verbal  Barriers

## 2024-10-01 NOTE — DISCHARGE INSTRUCTIONS
ANY ORTHOPEDIC QUESTIONS OR ANY OTHER CONCERNS YOU MAY CALL THE ORTHOPEDIC COORDINATOR:  RYAN GONZALEZ RN, MSN  [phone]  [email or web address]    DISCHARGE INSTRUCTIONS  Caring for yourself after joint replacement surgery (Total Hip and Total Knee Replacement)    Activity and Therapy  Receive physical therapy three times per week. (Pain medication one hour prior to therapy)   Perform PT exercises on own when not receiving home or outpatient PT.  Ideally exercises should be at least two times a day.  Increase level of activity and ambulation each day.  Perform deep breathing exercises daily.  Patient provides self-care when possible.  Work on Range of motion for Total knee patients.   No pillow under the knee for Total knee patients.  Elevate the surgical leg when seated.  No driving until cleared by surgeon      Diet:  Increase oral intake of fruits, fiber and water to prevent constipation.  Drink fluids frequently and take stool softeners to aid in bowel motility.  Increase protein intake/reduce high-sugar intake to help promote healing and prevent infection.    Incision Care:  Keep Primaseal or other dressing intact and remove as directed by surgeon, unless saturated, in which case, call surgeon and request instructions.  If dressing falls off, call surgeon.  Chance Hose on in the am and off in the pm to reduce swelling.  Ice affected area four times a day, for twenty minutes.    Pain Medications and Anticoagulant  You have been place on an anticoagulant to prevent blood clots.  Take this medication exactly as prescribed.  Be alert for signs of bleeding.  Take care not to injure yourself.  You have been provided pain medicine to control your pain.  Do not take more narcotics than prescribed.  You may begin weaning from narcotics as your pain level improves by decreasing the amount or frequency of the narcotics.  You may also take plain acetaminophen as an alternate to the narcotics.   Never exceed the

## 2024-10-01 NOTE — H&P
History and Physical Update    Pt Name: Oli Cloud  MRN: [id number]  YOB: 1970  Date of evaluation: 10/1/2024    [x] I have examined the patient and reviewed the H&P/Consult and there are no changes to the patient or plans.    [] I have examined the patient and reviewed the H&P/Consult and have noted the following changes:        Greyson Rodriguez MD   Electronically signed 10/1/2024 at 7:04 AM

## 2024-10-01 NOTE — ANESTHESIA PRE PROCEDURE
Department of Anesthesiology  Preprocedure Note       Name:  Oli Cloud   Age:  54 y.o.  :  1970                                          MRN:  [id number]         Date:  10/1/2024      Surgeon: Surgeon(s):  Greyson Rodriguez MD    Procedure: Procedure(s):  LEFT KNEE TOTAL ARTHROPLASTY    Medications prior to admission:   Prior to Admission medications    Medication Sig Start Date End Date Taking? Authorizing Provider   magnesium oxide (MAG-OX) 400 (240 Mg) MG tablet Take 1 tablet by mouth daily   Yes Nilton López MD   DHEA 25 MG TABS Take 1 tablet by mouth daily   Yes Nilton López MD   Coenzyme Q10 (COQ10) 100 MG CAPS Take 1 capsule by mouth daily   Yes Nilton López MD   anastrozole (ARIMIDEX) 1 MG tablet TAKE ONE TABLET BY MOUTH EVERY 14 DAYS  Patient taking differently: Take 1 tablet by mouth Every 2 weeks 24  Yes Shiraz Guaman MD   potassium chloride (KLOR-CON M) 10 MEQ extended release tablet TAKE ONE TABLET BY MOUTH ONCE A DAY 24  Yes Shiraz Guaman MD   metFORMIN (GLUCOPHAGE-XR) 500 MG extended release tablet TAKE ONE TABLET BY MOUTH ONCE A DAY BEFORE BREAKFAST  Patient taking differently: Take 1 tablet by mouth daily (with breakfast) 24  Yes Shiraz Guaman MD   vitamin D (ERGOCALCIFEROL) 1.25 MG (40796 UT) CAPS capsule Take 1 capsule by mouth once a week x8 weeks 24  Yes Shiraz Guaman MD   amLODIPine (NORVASC) 10 MG tablet Take 1 tablet by mouth daily 22  Yes Nilton López MD   labetalol (NORMODYNE) 200 MG tablet Take 1 tablet by mouth 2 times daily   Yes Nilton López MD   hydroCHLOROthiazide (HYDRODIURIL) 12.5 MG tablet Take 1 tablet by mouth daily   Yes Nilton López MD   acetaminophen (TYLENOL) 500 MG tablet Take 1 tablet by mouth every 6 hours as needed for Pain   Yes Nilton López MD   Multiple Vitamins-Minerals (MULTIVITAMIN ADULT PO) Take 1 tablet by mouth daily    Yes Rene

## 2024-10-01 NOTE — H&P
Interval H&P Note    Pt Name: Oli Cloud  MRN: [id number]  YOB: 1970  Date of evaluation: 10/1/2024      [x] I have reviewed the hard copy progress note by TABBY Bhardwaj dated 09/19/2024 for an Interval History and Physical note. Note labeled and placed in paper chart.     [x] I have examined  Oli Cloud, a 54 y.o. male.There are no changes to the patient who is scheduled for LEFT KNEE TOTAL ARTHROPLASTY by Greyson Rodriguez MD for Primary osteoarthritis of left knee. Patient reports he has had left knee pain for about 20 years that has progressively worsened. It rates 7/10 sharp, shooting pain with activity. Tylenol helps to alleviate his pain. The patient denies new health changes, fever, chills, wheezing, cough, increased SOB, chest pain, open sores or wounds. Known diabetes, POC . Last aspirin dose 09/24/2024.    Patient received cardiac clearance and placed in paper chart.    Vital signs: BP (!) 140/83   Pulse 74   Temp 97.5 °F (36.4 °C)   Resp 18   Ht 1.93 m (6' 4\")   Wt 124.7 kg (275 lb)   SpO2 95%   BMI 33.47 kg/m²     Allergies:  Patient has no known allergies.    Medications:    Prior to Admission medications    Medication Sig Start Date End Date Taking? Authorizing Provider   magnesium oxide (MAG-OX) 400 (240 Mg) MG tablet Take 1 tablet by mouth daily   Yes ProviderNilton MD   DHEA 25 MG TABS Take 1 tablet by mouth daily   Yes ProviderNilton MD   Coenzyme Q10 (COQ10) 100 MG CAPS Take 1 capsule by mouth daily   Yes Nilton López MD   anastrozole (ARIMIDEX) 1 MG tablet TAKE ONE TABLET BY MOUTH EVERY 14 DAYS  Patient taking differently: Take 1 tablet by mouth Every 2 weeks 9/18/24  Yes Shiraz Guaman MD   potassium chloride (KLOR-CON M) 10 MEQ extended release tablet TAKE ONE TABLET BY MOUTH ONCE A DAY 7/22/24  Yes Shiraz Guaman MD   metFORMIN (GLUCOPHAGE-XR) 500 MG extended release tablet TAKE ONE TABLET BY MOUTH ONCE A

## 2024-10-01 NOTE — PROGRESS NOTES
Orthopedic Coordinator Note    Patient s/p left total Knee replacement on 10/01/2024 WITH .    The following appointments are currently scheduled:    Post-op with surgeon 10/14/2024 AT 1430, PT TO ARRIVE EARLY FOR XRAYS.    Physical Therapy [facility] WITH [facility] IS ABLE TO ACCEPT.    PT HAS A FWW.    DVT Prophylaxis: 325MG ASA BID X 12 DAYS POSTOP.    PT HAD RTKA 11/10/2022.    PT IS A SDD.      Any questions please contact Ryan Stevenson RN, MSN  [phone]      Electronically signed by: RYAN STEVENSON RN on 10/1/2024 at 8:48 AM

## 2024-10-01 NOTE — OP NOTE
knee was stable with varus and valgus stresses.  Our mechanical axis was checked and was found to be aligned from the center of the hip to the center of the knee to the center of the ankle.  The gap  was then removed and our flexion gap was checked to make sure that it too was a size 10.  Our epicondylar axis was then drawn on the distal femur.  Our femoral sizing guide was placed on the femur and the femur measured to be a size 12.  The drill holes for the 4-in-1 cut block were then drilled to be parallel to our epicondylar access in 3 degrees of external rotation.  Our 4-in-1 cuts were then performed.  A lamina  was then inserted into the flexion gap.  Any remnants of our medial lateral menisci were removed in their entirety.  A curved osteotome was used to remove osteophytes off of the medial lateral posterior femur.  Our Exparel and total joint solution was then used to inject the soft tissues about the entire knee.  A double-pronged posterior retractor was then placed and the tibia was subluxed anteriorly.  The middle one third of our tibial tubercle was identified and marked.  Our tibial sizing guide was placed and aligned with the franck made from our tibial tubercle.  Once sized and in the appropriate alignment it was pinned into place.  We then turned our attention to the femur.  Our trial femoral component was impacted into place.  A size 10 trial was then positioned into the knee .  The knee now felt balanced in flexion and extension and varus and valgus.  We then inverted the patella and her patella thickness measured to be 24 mm.  Our patella osteotomy was performed.  The patella measured to be a size 35.  Our drill holes for the patella were then created and our trial patella was placed.  We confirmed that the thickness of our patella was restored.  The knee was now brought through full range of motion and patellar tracking was assessed.  The patella appeared to track very nicely.  An  Esmarch was then used to exsanguinate the extremity.  The tourniquet was elevated to 250.  With the tourniquet up our trial femur and polyethylene components were removed.  We then created the keel for our tibial component.  After that was done all of our osteotomy aid bone ends were thoroughly irrigated and dried.  2 batches of cement with 1 g of vancomycin were prepared on the back table.  Our final components were cemented into place and any excess cement was removed.  A size 10 trial polyethylene component was inserted into the knee and the knee was held in full extension as the cement hardened.   A patella clamp was placed on the patella as the patella cement hardened.  After the cement set up, the knee was brought through one last full range of motion.  Betadine solution was used to fully irrigate the knee at this point. The knee felt stable with varus and valgus in flexion and extension with a size 10 polyethylene trial.  Our final polyethylene was then inserted and clicked into place.  We confirmed there was no soft tissue caught underneath the polyethylene.  The knee was thoroughly irrigated with jet lavage.  Irrisept was also used for irrigation.  Our final Exparel and total joint solution was injected into the skin and soft tissues.  The extensor mechanism was closed with a #2 Vicryl and a #1 Quill suture.  The soft tissues were closed with an 0 Vicryl and a running 2 oh VueLock suture.  The skin was approximated with a 3-0 Monocryl suture.  Skin glue was placed over the incision.  After the glue was hardened sterile dressings were placed on the knee and an Ace bandage was placed from foot to thigh.  The patient was transported to recovery in stable condition.  Final closure of the skin was performed by a first assist.    Electronically signed by Greyson Rodriguez MD on 10/1/2024 at 9:28 AM

## 2024-10-02 LAB — SURGICAL PATHOLOGY REPORT: NORMAL

## 2024-10-02 NOTE — PROGRESS NOTES
Physical Therapy  Facility/Department: [facility] OR  Physical Therapy Initial Assessment    Name: Oli Cloud  : 1970  MRN: [id number]  Date of Service: 10/2/2024    Discharge Recommendations:  Patient would benefit from continued therapy after discharge          Patient Diagnosis(es): The primary encounter diagnosis was Acute postoperative pain. A diagnosis of Primary osteoarthritis of left knee was also pertinent to this visit.  Past Medical History:  has a past medical history of Arthritis, Atrial fibrillation (HCC), COVID-19, Diabetes mellitus (HCC), Hyperlipidemia, Hypertension, Neuropathy, Skin cancer, and Sleep apnea.  Past Surgical History:  has a past surgical history that includes transesophageal echocardiogram (2019); Cardioversion (2019); Mohs surgery; Cardioversion (2019); ablation of dysrhythmic focus (2019); Colonoscopy; skin biopsy; Vein Surgery (Left); Knee arthroscopy (Bilateral); Septoplasty (Bilateral, 2021); Insertable Cardiac Monitor (); and Total knee arthroplasty (Right, 11/10/2022).    Assessment  Body Structures, Functions, Activity Limitations Requiring Skilled Therapeutic Intervention: Decreased functional mobility ;Decreased ROM;Increased pain    Assessment: Pt presents s/p same day total joint w/ post op dysfunction. Pt demonstrating good tolerance to gait with both r.walker and crutches. Pt had used crutches at last surgery that were 8 inches too short - pt issued appropriate height crutches for discharge. Pt motivated and with supportive family. Would expect safe discharge.    Specific Instructions for Next Treatment: n/a  Therapy Prognosis: Good  Decision Making: Medium Complexity  Clinical Presentation: evolving  Requires PT Follow-Up: Yes  Activity Tolerance  Activity Tolerance: Patient tolerated evaluation without incident    Plan  Physical Therapy Plan  General Plan: 2 times a day 7 days a week  Specific Instructions for Next Treatment:  issued HEP and independent w/ exercises.  Short Term Goal 2: Pt able to amb x 100 ft w/ rwalker independently  Short Term Goal 3: Pt able to ascend / descend 4 steps w/ one rail independently  Patient Goals   Patient Goals : Pt goal is for a safe d/c home.       Education  Patient Education  Education Given To: Patient;Family  Education Provided: Role of Therapy;Home Exercise Program  Education Provided Comments: TKA HEP issued and reviewed  Education Method: Demonstration;Verbal;Printed Information/Hand-outs  Barriers to Learning: None  Education Outcome: Verbalized understanding      Therapy Time   Individual Concurrent   Time In 1240 1303   Time Out 1250 1416   Minutes 10 73       Total patient care time 83 minutes - treatment time 70    MAO FLORES, PT

## 2024-10-15 ENCOUNTER — HOSPITAL ENCOUNTER (OUTPATIENT)
Dept: PHYSICAL THERAPY | Facility: CLINIC | Age: 54
Setting detail: THERAPIES SERIES
Discharge: HOME | End: 2024-10-15
Payer: COMMERCIAL

## 2024-10-15 PROCEDURE — 97110 THERAPEUTIC EXERCISES: CPT

## 2024-10-15 PROCEDURE — 97016 VASOPNEUMATIC DEVICE THERAPY: CPT

## 2024-10-15 PROCEDURE — 97161 PT EVAL LOW COMPLEX 20 MIN: CPT

## 2024-10-15 NOTE — CONSULTS
[] [facility]  Outpatient Rehabilitation &  Therapy  [address]  [phone]  F: [phone] [] [facility]  Outpatient Rehabilitation &  Therapy  [address]  P: [phone]  F: [phone] [] [facility]  Outpatient Rehabilitation &  Therapy  [address]  P: [phone]  F: [phone] [] [facility]  Outpatient Rehabilitation &  Therapy  [address]  P: [phone]  F: [phone] [x] [facility]  Outpatient Rehabilitation &  Therapy  [address]   P: [phone]  F: [phone]      Physical Therapy Lower Extremity Evaluation    Date:  10/15/2024  Patient: Oli Cloud   : 1970  MRN: [id number]  Physician: Dr. Rodriguez    Insurance: [facility] ( vs)  Medical Diagnosis: Z96.652 (ICD-10-CM) - Presence of left artificial knee joint     Rehab Codes: M62.81 , R26.89 , M25.562, M25.662 , M25.362   Onset date: 10/1/24      Next 's appt.: 24     Subjective:   CC/HPI: Patient is a 55 y/o male presenting with left knee pain and swelling s/p left TKA completed on 10/1/24. Patient denies complications from the surgery. He has been using crutches at home for mobility. Patient denies falls. His wife has been assisting with ADL's as needed. He completed 4 total visits of home health therapy and continued with his exercises at home. Has continued to use stairs at home, as his bedroom is upstairs. Has hx of right TKA.     PMHx: [] Unremarkable [] Diabetes [] HTN  [] Pacemaker   [] MI/Heart Problems [] Cancer [] Arthritis [] Other:              [x] Refer to full medical chart  In EPIC     Past Medical History         Diagnosis Date Comments     Hyperlipidemia [E78.5]       Hypertension [I10]       Atrial fibrillation (HCC) [I48.91] 2018 Intermittent. Seees Dr Marlow     Arthritis [M19.90]       Skin cancer [C44.90]  Mohs procedurex 2     Sleep apnea

## 2024-10-21 ENCOUNTER — HOSPITAL ENCOUNTER (OUTPATIENT)
Dept: PHYSICAL THERAPY | Facility: CLINIC | Age: 54
Setting detail: THERAPIES SERIES
Discharge: HOME | End: 2024-10-21
Payer: COMMERCIAL

## 2024-10-21 PROCEDURE — 97016 VASOPNEUMATIC DEVICE THERAPY: CPT

## 2024-10-21 PROCEDURE — 97110 THERAPEUTIC EXERCISES: CPT

## 2024-10-21 NOTE — FLOWSHEET NOTE
[] [facility]  Outpatient Rehabilitation &  Therapy  [address]  [phone]  [phone] [] [facility]  Outpatient Rehabilitation &  Therapy  [address]  P: [phone]  F: [phone] [] [facility]  Outpatient Rehabilitation &  Therapy  [address]  P: [phone]  F: [phone] [] [facility]  Outpatient Rehabilitation &  Therapy  [address]  [phone]  [phone] [x] [facility]  Outpatient Rehabilitation &  Therapy  [address]   P: [phone]  F: [phone]  [] [facility]  Outpatient Rehabilitation &  Therapy  [address]  P: [phone]  F: [phone] [] [facility]  Outpatient Rehabilitation &  Therapy  [address]  P: [phone]  F: [phone] [] [facility]  Outpatient Rehabilitation &  Therapy  [address]  P: [phone]  F: [phone] [] [facility]  Outpatient Rehabilitation &  Therapy  [address]  P: [phone]  F: [phone]  [] [facility] Outpatient Rehabilitation &  Therapy  [address]  P: [phone]  F: [phone]     Physical Therapy Daily Treatment Note    Date:  10/21/2024  Patient Name:  Oli THOMPSON Ai    :  1970  MRN: [id number]  Physician: Dr. Rodriguez                                               Insurance: [facility] ( vs)  Medical Diagnosis: Z96.652 (ICD-10-CM) - Presence of left artificial knee joint                  Rehab Codes: M62.81 , R26.89 , M25.562, M25.662 , M25.362   Onset date: 10/1/24                                                   Next 's appt.: 24   Visit# / total visits:     Cancels/No Shows: 0    Subjective:    Pain:  [x] Yes  [] No Location:  left leg   Pain Rating: (0-10 scale) not rated/10  Pain altered

## 2024-10-23 ENCOUNTER — HOSPITAL ENCOUNTER (OUTPATIENT)
Dept: PHYSICAL THERAPY | Facility: CLINIC | Age: 54
Setting detail: THERAPIES SERIES
Discharge: HOME | End: 2024-10-23
Payer: COMMERCIAL

## 2024-10-23 PROCEDURE — 97110 THERAPEUTIC EXERCISES: CPT

## 2024-10-23 PROCEDURE — 97016 VASOPNEUMATIC DEVICE THERAPY: CPT

## 2024-10-23 NOTE — FLOWSHEET NOTE
decrease in knee pain to ease ADL's and allow for improved independence     STG/LTG   Goals  MET NOT MET ON-  GOING  Details   Date Addressed:            STG: To be met in 10 treatments            1. ? Pain: Decrease L knee pain levels to 3/10 with ADLs []  []  []      2. ? ROM: Increase L knee AROM to at least 0-120 deg to reduce difficulty with ADLs []  []  []      3. Patient to demonstrate a set of 20 SLR without quadriceps lag to ease ADL's and improve ability to sit to stand  []  []  []      4. Patient to demonstrate gait without use of an AD with equal LE weight bearing and minimal to no antalgia  []  []  []      5.  Improve score on assessment tool KOOS from 71% impairment to less than 55% impairment  []  []  []      6. Independent with Home Exercise Programs []  []  []      Date Addressed:            LTG: To be met in 20 treatments           1. Improve score on assessment tool KOOS to less than 40% impairment  []  []  []      2. Reduce pain levels to 0/10 or less with ADLs []  []  []      3. Patient to demonstrate a squat with equal LE weight bearing and minimal to no increase in pain levels  []  []  []      4.  ? Strength: Increase MMT to 5/5 throughout to ease functional limitations and mobility                            Patient goals: pain relief, walk normal       Pt. Education:  [x] Yes  [] No  [x] Reviewed Prior HEP/Ed - continue previous HEP  Method of Education: [x] Verbal  [x] Demo  [] Written      Comprehension of Education:  [x] Verbalizes understanding.  [x] Demonstrates understanding.  [] Needs review.  [x] Demonstrates/verbalizes HEP/Ed previously given.       Plan: [x] Continue current frequency toward long and short term goals.    [x] Specific Instructions for subsequent treatments: see above         Time In:8:30a             Time Out: 9:30a      Electronically signed by:  Olinda Nolan, PT

## 2024-10-28 ENCOUNTER — OFFICE VISIT (OUTPATIENT)
Dept: PODIATRY | Age: 54
End: 2024-10-28
Payer: COMMERCIAL

## 2024-10-28 VITALS — WEIGHT: 270 LBS | BODY MASS INDEX: 32.88 KG/M2 | HEIGHT: 76 IN

## 2024-10-28 DIAGNOSIS — L85.3 XEROSIS CUTIS: ICD-10-CM

## 2024-10-28 DIAGNOSIS — B35.3 TINEA PEDIS OF BOTH FEET: ICD-10-CM

## 2024-10-28 DIAGNOSIS — B35.1 ONYCHOMYCOSIS OF TOENAIL: Primary | ICD-10-CM

## 2024-10-28 DIAGNOSIS — M79.671 PAIN IN RIGHT FOOT: ICD-10-CM

## 2024-10-28 DIAGNOSIS — M54.17 LUMBOSACRAL RADICULOPATHY: ICD-10-CM

## 2024-10-28 DIAGNOSIS — M79.672 PAIN IN LEFT FOOT: ICD-10-CM

## 2024-10-28 DIAGNOSIS — G57.91 COMPRESSION NEUROPATHY OF RIGHT LOWER EXTREMITY: ICD-10-CM

## 2024-10-28 DIAGNOSIS — L84 CORNS AND CALLOSITIES: ICD-10-CM

## 2024-10-28 DIAGNOSIS — M79.675 PAIN OF TOES OF BOTH FEET: ICD-10-CM

## 2024-10-28 DIAGNOSIS — G57.92 COMPRESSION NEUROPATHY OF LEFT LOWER EXTREMITY: ICD-10-CM

## 2024-10-28 DIAGNOSIS — M79.674 PAIN OF TOES OF BOTH FEET: ICD-10-CM

## 2024-10-28 PROCEDURE — 11056 PARNG/CUTG B9 HYPRKR LES 2-4: CPT | Performed by: PODIATRIST

## 2024-10-28 PROCEDURE — 99203 OFFICE O/P NEW LOW 30 MIN: CPT | Performed by: PODIATRIST

## 2024-10-28 PROCEDURE — 11721 DEBRIDE NAIL 6 OR MORE: CPT | Performed by: PODIATRIST

## 2024-10-28 RX ORDER — ASPIRIN 81 MG/1
81 TABLET ORAL DAILY
COMMUNITY

## 2024-10-28 RX ORDER — CICLOPIROX 7.7 MG/G
GEL TOPICAL
Qty: 100 G | Refills: 3 | Status: SHIPPED | OUTPATIENT
Start: 2024-10-28

## 2024-10-28 RX ORDER — UREA 200 MG/G
CREAM TOPICAL
Qty: 480 G | Refills: 3 | Status: SHIPPED | OUTPATIENT
Start: 2024-10-28

## 2024-10-28 RX ORDER — CICLOPIROX OLAMINE 7.7 MG/G
CREAM TOPICAL
Qty: 90 G | Refills: 3 | Status: SHIPPED | OUTPATIENT
Start: 2024-10-28

## 2024-10-28 ASSESSMENT — ENCOUNTER SYMPTOMS
COLOR CHANGE: 0
BACK PAIN: 0
DIARRHEA: 0
NAUSEA: 0
SHORTNESS OF BREATH: 0

## 2024-10-28 NOTE — PROGRESS NOTES
foot/ankle.     Erythema is absent to the bilateral feet.    Neurological: Achilles tendon reflex is intact bilaterally.    Protective sensation is present to the right plantar foot as noted with a 5.07 Edison-Bard monofilament.     Protective sensation is present to the left plantar foot as noted with a 5.07 Edison-Brad monofilament.    Vibratory sense is minimally diminished to the bilateral feet as noted with a 128 Hz tuning fork.    Musculoskeletal:  Muscle strength is +5/5 to all four muscle groups of the right lower extremity and +5/5 to all four muscle groups of the left lower extremity.     There are no areas of subluxation, dislocation, or laxity noted to either lower extremity.     Range of motion to the right ankle is  free of pain or grinding.     Range of motion to the left ankle is  free of pain or grinding.     Range of motion to the right subtalar joint is  free of pain or grinding.     Range of motion to the left subtalar joint is  free of pain or grinding.     No abnormalities, asymmetries, or misalignments are seen between the extremities.    Weightbearing evaluation does not reveal rearfoot eversion, medial prominence of the talar head, loss of the medial longitudinal arch height, and too many toes sign bilaterally.    The lesser digits of the right foot are contracted.     The lesser digits of the left foot are contracted.     There is no prominence noted to the first metatarsal head without abduction of the hallux of the right foot.     There is no prominence noted to the first metatarsal head without abduction of the hallux of the left foot.    Shoe examination was performed.    Biomechanical Exam: normal bilaterally.    Asessment: Patient is a 54 y.o. male with:    Diagnosis Orders   1. Onychomycosis of toenail  DEBRIDEMENT OF NAILS, 6 OR MORE     DIABETES FOOT EXAM      2. Corns and callosities   DIABETES FOOT EXAM    TRIM HYPERKERATOTIC SKIN LESION,2-4      3. Xerosis cutis

## 2024-10-30 ENCOUNTER — HOSPITAL ENCOUNTER (OUTPATIENT)
Dept: PHYSICAL THERAPY | Facility: CLINIC | Age: 54
Setting detail: THERAPIES SERIES
Discharge: HOME | End: 2024-10-30
Payer: COMMERCIAL

## 2024-10-30 PROCEDURE — 97110 THERAPEUTIC EXERCISES: CPT

## 2024-10-30 PROCEDURE — 97016 VASOPNEUMATIC DEVICE THERAPY: CPT

## 2024-10-30 NOTE — FLOWSHEET NOTE
[] [facility]  Outpatient Rehabilitation &  Therapy  [address]  [phone]  [phone] [] [facility]  Outpatient Rehabilitation &  Therapy  [address]  P: [phone]  F: [phone] [] [facility]  Outpatient Rehabilitation &  Therapy  [address]  P: [phone]  F: [phone] [] [facility]  Outpatient Rehabilitation &  Therapy  [address]  [phone]  [phone] [x] [facility]  Outpatient Rehabilitation &  Therapy  [address]   P: [phone]  F: [phone]  [] [facility]  Outpatient Rehabilitation &  Therapy  [address]  P: [phone]  F: [phone] [] [facility]  Outpatient Rehabilitation &  Therapy  [address]  P: [phone]  F: [phone] [] [facility]  Outpatient Rehabilitation &  Therapy  [address]  P: [phone]  F: [phone] [] [facility]  Outpatient Rehabilitation &  Therapy  [address]  P: [phone]  F: [phone]  [] [facility] Outpatient Rehabilitation &  Therapy  [address]  P: [phone]  F: [phone]     Physical Therapy Daily Treatment Note    Date:  10/30/2024  Patient Name:  Oli THOMPSON Ai    :  1970  MRN: [id number]  Physician: Dr. Rodriguez                                               Insurance: [facility] ( vs)  Medical Diagnosis: Z96.652 (ICD-10-CM) - Presence of left artificial knee joint                  Rehab Codes: M62.81 , R26.89 , M25.562, M25.662 , M25.362   Onset date: 10/1/24                                                   Next 's appt.: 24     Visit# / total visits:     Cancels/No Shows: 0    Subjective:    Pain:  [x] Yes  [] No Location:  left leg   Pain Rating: (0-10 scale) not rated/10  Pain

## 2024-11-01 ENCOUNTER — HOSPITAL ENCOUNTER (OUTPATIENT)
Dept: PHYSICAL THERAPY | Facility: CLINIC | Age: 54
Setting detail: THERAPIES SERIES
Discharge: HOME | End: 2024-11-01
Payer: COMMERCIAL

## 2024-11-01 PROCEDURE — 97110 THERAPEUTIC EXERCISES: CPT

## 2024-11-01 PROCEDURE — 97016 VASOPNEUMATIC DEVICE THERAPY: CPT

## 2024-11-01 NOTE — FLOWSHEET NOTE
Other:  [x] Patient would continue to benefit from skilled physical therapy services in order to:  improve knee ROM, improve strength, improve gait without an AD, and decrease in knee pain to ease ADL's and allow for improved independence     STG/LTG   Goals  MET NOT MET ON-  GOING  Details   Date Addressed:            STG: To be met in 10 treatments            1. ? Pain: Decrease L knee pain levels to 3/10 with ADLs []  []  []      2. ? ROM: Increase L knee AROM to at least 0-120 deg to reduce difficulty with ADLs []  []  []      3. Patient to demonstrate a set of 20 SLR without quadriceps lag to ease ADL's and improve ability to sit to stand  []  []  []      4. Patient to demonstrate gait without use of an AD with equal LE weight bearing and minimal to no antalgia  []  []  []      5.  Improve score on assessment tool KOOS from 71% impairment to less than 55% impairment  []  []  []      6. Independent with Home Exercise Programs []  []  []      Date Addressed:            LTG: To be met in 20 treatments           1. Improve score on assessment tool KOOS to less than 40% impairment  []  []  []      2. Reduce pain levels to 0/10 or less with ADLs []  []  []      3. Patient to demonstrate a squat with equal LE weight bearing and minimal to no increase in pain levels  []  []  []      4.  ? Strength: Increase MMT to 5/5 throughout to ease functional limitations and mobility                            Patient goals: pain relief, walk normal       Pt. Education:  [x] Yes  [] No  [x] Reviewed Prior HEP/Ed - continue previous HEP  Method of Education: [x] Verbal  [x] Demo  [] Written      Comprehension of Education:  [x] Verbalizes understanding.  [x] Demonstrates understanding.  [] Needs review.  [x] Demonstrates/verbalizes HEP/Ed previously given.       Plan: [x] Continue current frequency toward long and short term goals.    [x] Specific Instructions for subsequent treatments: see above         Time In: 11:00a

## 2024-11-04 ENCOUNTER — HOSPITAL ENCOUNTER (OUTPATIENT)
Dept: PHYSICAL THERAPY | Facility: CLINIC | Age: 54
Setting detail: THERAPIES SERIES
Discharge: HOME | End: 2024-11-04
Payer: COMMERCIAL

## 2024-11-04 PROCEDURE — 97016 VASOPNEUMATIC DEVICE THERAPY: CPT

## 2024-11-04 PROCEDURE — 97110 THERAPEUTIC EXERCISES: CPT

## 2024-11-04 NOTE — FLOWSHEET NOTE
[] [facility]  Outpatient Rehabilitation &  Therapy  [address]  [phone]  [phone] [] [facility]  Outpatient Rehabilitation &  Therapy  [address]  P: [phone]  F: [phone] [] [facility]  Outpatient Rehabilitation &  Therapy  [address]  P: [phone]  F: [phone] [] [facility]  Outpatient Rehabilitation &  Therapy  [address]  [phone]  [phone] [x] [facility]  Outpatient Rehabilitation &  Therapy  [address]   P: [phone]  F: [phone]  [] [facility]  Outpatient Rehabilitation &  Therapy  [address]  P: [phone]  F: [phone] [] [facility]  Outpatient Rehabilitation &  Therapy  [address]  P: [phone]  F: [phone] [] [facility]  Outpatient Rehabilitation &  Therapy  [address]  P: [phone]  F: [phone] [] [facility]  Outpatient Rehabilitation &  Therapy  [address]  P: [phone]  F: [phone]  [] [facility] Outpatient Rehabilitation &  Therapy  [address]  P: [phone]  F: [phone]     Physical Therapy Daily Treatment Note    Date:  2024  Patient Name:  Oli THOMPSON Ai    :  1970  MRN: [id number]  Physician: Dr. Rodriguez                                               Insurance: [facility] ( vs)  Medical Diagnosis: Z96.652 (ICD-10-CM) - Presence of left artificial knee joint                  Rehab Codes: M62.81 , R26.89 , M25.562, M25.662 , M25.362   Onset date: 10/1/24                                                   Next 's appt.: 24   Visit# / total visits:     Cancels/No Shows: 0    Subjective:    Pain:  [x] Yes  [] No Location:  left leg   Pain Rating: (0-10 scale) not rated/10  Pain altered

## 2024-11-07 ENCOUNTER — HOSPITAL ENCOUNTER (OUTPATIENT)
Dept: PHYSICAL THERAPY | Facility: CLINIC | Age: 54
Setting detail: THERAPIES SERIES
Discharge: HOME | End: 2024-11-07
Payer: COMMERCIAL

## 2024-11-07 PROCEDURE — 97110 THERAPEUTIC EXERCISES: CPT

## 2024-11-07 PROCEDURE — 97016 VASOPNEUMATIC DEVICE THERAPY: CPT

## 2024-11-07 NOTE — FLOWSHEET NOTE
Tx:  [x] No  [] Yes  Action:  Comments: Patient arrived noting minor soreness and stiffness from increased activity over the weekend.    Objective:  Modalities:   Precautions: s/p TKA   Exercises:  Exercise    Reps/ Time Weight/ Level Comments             Bike 10'                Calf Stretch Slantboard  3x30\"       Hamstring Stretch  3x30\"                 Quad Set          SLR  2x10       Heel Slides HEP       Clamshells   A     Bridges         Sidelying hip abduction   A               4-way hip Tband   x15 Blue  outside of // bars    Tband TKE        Squats to mat table  x20       Heel Raises off step       Step ups with march  2x10  8\"    Lateral tap downs  2x10 4\"    Balance board 5' L2    Lunges 2x10     Prone hang  5' 5#    Cone drops  2x6 cones         Game Ready Vasocompression     Left   Right   Time   Temperature   Compression   Comments    [] Shoulder   [] Elbow  [] Hip   [x] Knee  [] Ankle   [] Boot   [] Low Back       [x]  []  [] 10 mins   [x] 15 mins   [] 20 mins     [x] 34 degrees   [] 36 degrees   [] 40 degrees   [] __ degrees  [] None   [] Low   [] Medium   [x] High         Specific Instructions for next treatment: progress HEP        Treatment Charges: Mins Units   []  Modalities       [x]  Ther Exercise 50 3   []  Neuromuscular Re-ed     []  Gait Training     []  Manual Therapy     []  Ther Activities     []  Aquatics     [x]  Vasocompression 15 1   []  Cervical Traction     []  Other     Total Billable time 65 min 4          Assessment: [x] Progressing toward goals. Continued program, no complaints of pain. Complete prone hang to ensure HEP compliance and he demonstrated good form with improvement in knee extension over the course of the stretch. To continue to work on end range knee extension strength and single leg stability.     [] No change.     [] Other:  [x] Patient would continue to benefit from skilled physical therapy services in order to:  improve knee ROM, improve strength, improve gait

## 2024-11-11 ENCOUNTER — HOSPITAL ENCOUNTER (OUTPATIENT)
Dept: PHYSICAL THERAPY | Facility: CLINIC | Age: 54
Setting detail: THERAPIES SERIES
Discharge: HOME | End: 2024-11-11
Payer: COMMERCIAL

## 2024-11-11 PROCEDURE — 97110 THERAPEUTIC EXERCISES: CPT

## 2024-11-11 PROCEDURE — 97016 VASOPNEUMATIC DEVICE THERAPY: CPT

## 2024-11-11 NOTE — FLOWSHEET NOTE
[] [facility]  Outpatient Rehabilitation &  Therapy  [address]  [phone]  [phone] [] [facility]  Outpatient Rehabilitation &  Therapy  [address]  P: [phone]  F: [phone] [] [facility]  Outpatient Rehabilitation &  Therapy  [address]  P: [phone]  F: [phone] [] [facility]  Outpatient Rehabilitation &  Therapy  [address]  [phone]  [phone] [x] [facility]  Outpatient Rehabilitation &  Therapy  [address]   P: [phone]  F: [phone]  [] [facility]  Outpatient Rehabilitation &  Therapy  [address]  P: [phone]  F: [phone] [] [facility]  Outpatient Rehabilitation &  Therapy  [address]  P: [phone]  F: [phone] [] [facility]  Outpatient Rehabilitation &  Therapy  [address]  P: [phone]  F: [phone] [] [facility]  Outpatient Rehabilitation &  Therapy  [address]  P: [phone]  F: [phone]  [] [facility] Outpatient Rehabilitation &  Therapy  [address]  P: [phone]  F: [phone]     Physical Therapy Daily Treatment Note    Date:  2024  Patient Name:  Oli THOMPSON Ai    :  1970  MRN: [id number]  Physician: Dr. Rodriguez                                               Insurance: [facility] ( vs)  Medical Diagnosis: Z96.652 (ICD-10-CM) - Presence of left artificial knee joint                  Rehab Codes: M62.81 , R26.89 , M25.562, M25.662 , M25.362   Onset date: 10/1/24                                                   Next 's appt.: 24   Visit# / total visits:  (updated count)    Cancels/No Shows: 0    Subjective:    Pain:  [x] Yes  [] No Location:  left leg   Pain Rating: (0-10 scale) not

## 2024-11-13 ENCOUNTER — HOSPITAL ENCOUNTER (OUTPATIENT)
Dept: PHYSICAL THERAPY | Facility: CLINIC | Age: 54
Setting detail: THERAPIES SERIES
Discharge: HOME | End: 2024-11-13
Payer: COMMERCIAL

## 2024-11-13 PROCEDURE — 97016 VASOPNEUMATIC DEVICE THERAPY: CPT

## 2024-11-13 PROCEDURE — 97110 THERAPEUTIC EXERCISES: CPT

## 2024-11-13 NOTE — FLOWSHEET NOTE
[] [facility]  Outpatient Rehabilitation &  Therapy  [address]  [phone]  [phone] [] [facility]  Outpatient Rehabilitation &  Therapy  [address]  P: [phone]  F: [phone] [] [facility]  Outpatient Rehabilitation &  Therapy  [address]  P: [phone]  F: [phone] [] [facility]  Outpatient Rehabilitation &  Therapy  [address]  [phone]  [phone] [x] [facility]  Outpatient Rehabilitation &  Therapy  [address]   P: [phone]  F: [phone]  [] [facility]  Outpatient Rehabilitation &  Therapy  [address]  P: [phone]  F: [phone] [] [facility]  Outpatient Rehabilitation &  Therapy  [address]  P: [phone]  F: [phone] [] [facility]  Outpatient Rehabilitation &  Therapy  [address]  P: [phone]  F: [phone] [] [facility]  Outpatient Rehabilitation &  Therapy  [address]  P: [phone]  F: [phone]  [] [facility] Outpatient Rehabilitation &  Therapy  [address]  P: [phone]  F: [phone]     Physical Therapy Daily Treatment Note    Date:  2024  Patient Name:  Oli THOMPSON Ai    :  1970  MRN: [id number]  Physician: Dr. Rodriguez                                               Insurance: [facility] ( vs)  Medical Diagnosis: Z96.652 (ICD-10-CM) - Presence of left artificial knee joint                  Rehab Codes: M62.81 , R26.89 , M25.562, M25.662 , M25.362   Onset date: 10/1/24                                                   Next 's appt.: 24     Visit# / total visits:  (updated count)    Cancels/No Shows: 0    Subjective:    Pain:  [] Yes  [x] No Location:  left leg   Pain Rating: (0-10 scale)

## 2024-11-20 ENCOUNTER — HOSPITAL ENCOUNTER (OUTPATIENT)
Dept: PHYSICAL THERAPY | Facility: CLINIC | Age: 54
Setting detail: THERAPIES SERIES
Discharge: HOME | End: 2024-11-20
Payer: COMMERCIAL

## 2024-11-20 PROCEDURE — 97110 THERAPEUTIC EXERCISES: CPT

## 2024-11-20 PROCEDURE — 97016 VASOPNEUMATIC DEVICE THERAPY: CPT

## 2024-11-20 NOTE — FLOWSHEET NOTE
[] [facility]  Outpatient Rehabilitation &  Therapy  [address]  [phone]  [phone] [] [facility]  Outpatient Rehabilitation &  Therapy  [address]  P: [phone]  F: [phone] [] [facility]  Outpatient Rehabilitation &  Therapy  [address]  P: [phone]  F: [phone] [] [facility]  Outpatient Rehabilitation &  Therapy  [address]  [phone]  [phone] [x] [facility]  Outpatient Rehabilitation &  Therapy  [address]   P: [phone]  F: [phone]  [] [facility]  Outpatient Rehabilitation &  Therapy  [address]  P: [phone]  F: [phone] [] [facility]  Outpatient Rehabilitation &  Therapy  [address]  P: [phone]  F: [phone] [] [facility]  Outpatient Rehabilitation &  Therapy  [address]  P: [phone]  F: [phone] [] [facility]  Outpatient Rehabilitation &  Therapy  [address]  P: [phone]  F: [phone]  [] [facility] Outpatient Rehabilitation &  Therapy  [address]  P: [phone]  F: [phone]     Physical Therapy Daily Treatment Note    Date:  2024  Patient Name:  Oli THOMPSON Ai    :  1970  MRN: [id number]  Physician: Dr. Rodriguez                                               Insurance: [facility] ( vs)  Medical Diagnosis: Z96.652 (ICD-10-CM) - Presence of left artificial knee joint                  Rehab Codes: M62.81 , R26.89 , M25.562, M25.662 , M25.362   Onset date: 10/1/24                                                   Next 's appt.: 24     Visit# / total visits: 10/20 (updated count)    Cancels/No Shows: 0    Subjective:    Pain:  [] Yes  [x] No Location:  left leg   Pain Rating: (0-10 scale)

## 2024-11-22 ENCOUNTER — APPOINTMENT (OUTPATIENT)
Dept: PHYSICAL THERAPY | Facility: CLINIC | Age: 54
End: 2024-11-22
Payer: COMMERCIAL

## 2024-11-25 ENCOUNTER — OFFICE VISIT (OUTPATIENT)
Dept: PODIATRY | Age: 54
End: 2024-11-25
Payer: COMMERCIAL

## 2024-11-25 VITALS — HEIGHT: 76 IN | WEIGHT: 260 LBS | BODY MASS INDEX: 31.66 KG/M2

## 2024-11-25 DIAGNOSIS — B35.1 ONYCHOMYCOSIS OF TOENAIL: Primary | ICD-10-CM

## 2024-11-25 DIAGNOSIS — M79.672 PAIN IN LEFT FOOT: ICD-10-CM

## 2024-11-25 DIAGNOSIS — L84 CORNS AND CALLOSITIES: ICD-10-CM

## 2024-11-25 DIAGNOSIS — M79.671 PAIN IN RIGHT FOOT: ICD-10-CM

## 2024-11-25 DIAGNOSIS — M79.675 PAIN OF TOES OF BOTH FEET: ICD-10-CM

## 2024-11-25 DIAGNOSIS — M79.674 PAIN OF TOES OF BOTH FEET: ICD-10-CM

## 2024-11-25 PROCEDURE — 11056 PARNG/CUTG B9 HYPRKR LES 2-4: CPT | Performed by: PODIATRIST

## 2024-11-25 PROCEDURE — 99999 PR OFFICE/OUTPT VISIT,PROCEDURE ONLY: CPT | Performed by: PODIATRIST

## 2024-11-25 PROCEDURE — 11721 DEBRIDE NAIL 6 OR MORE: CPT | Performed by: PODIATRIST

## 2024-11-25 ASSESSMENT — ENCOUNTER SYMPTOMS
SHORTNESS OF BREATH: 0
BACK PAIN: 0
COLOR CHANGE: 0
NAUSEA: 0
DIARRHEA: 0

## 2024-11-25 NOTE — PROGRESS NOTES
SUBJECTIVE: Oli Cloud is a 54 y.o. male who returns to the office with chief complaint of painful fungal toenails. Patient relates toe nails are thickened/difficult to trim as well as painful with ambulation and with shoe gear.   Chief Complaint   Patient presents with    Nail Problem     Nail trim/last saw  Dr.Phillip Guaman 5/1/24    Diabetes     Last A1c 5.4     Review of Systems   Constitutional:  Negative for activity change, appetite change, chills, diaphoresis, fatigue and fever.   Respiratory:  Negative for shortness of breath.    Cardiovascular:  Negative for leg swelling.   Gastrointestinal:  Negative for diarrhea and nausea.   Endocrine: Negative for cold intolerance, heat intolerance and polyuria.   Musculoskeletal:  Positive for arthralgias. Negative for back pain, gait problem, joint swelling and myalgias.   Skin:  Negative for color change, pallor, rash and wound.   Allergic/Immunologic: Negative for environmental allergies and food allergies.   Neurological:  Positive for numbness. Negative for dizziness, weakness and light-headedness.   Hematological:  Does not bruise/bleed easily.   Psychiatric/Behavioral:  Negative for behavioral problems, confusion and self-injury. The patient is not nervous/anxious.      OBJECTIVE: Clinical evaluation of patient reveals nails 1,2,3,4,5 of the right foot and nails 1,2,3,4,5 of the left foot to present with thickness, elongation, discoloration, brittleness, and subungual debris. There was pain with palpation and debridement of the toenails of the bilateral feet. No open lesions noted to either foot today. There is hyperkeratotic tissue formation noted to the plantar medial aspect of the bilateral hallux. There is pain with direct palpation of the lesion(s). Debridement of the lesion(s) with a fifteen blade does not reveal a central core. The core of the lesion(s) was debrided with a fifteen blade. No signs of bacterial infection are noted to the

## 2024-11-26 ENCOUNTER — APPOINTMENT (OUTPATIENT)
Dept: PHYSICAL THERAPY | Facility: CLINIC | Age: 54
End: 2024-11-26
Payer: COMMERCIAL

## 2024-11-29 ENCOUNTER — HOSPITAL ENCOUNTER (OUTPATIENT)
Dept: PHYSICAL THERAPY | Facility: CLINIC | Age: 54
Setting detail: THERAPIES SERIES
Discharge: HOME | End: 2024-11-29
Payer: COMMERCIAL

## 2024-12-31 NOTE — DISCHARGE SUMMARY
[] [facility]  Outpatient Rehabilitation &  Therapy  [address]  [phone]  [phone] [] [facility]  Outpatient Rehabilitation &  Therapy  [address]  P: [phone]  F: [phone] [] [facility]  Outpatient Rehabilitation &  Therapy  [address]  P: [phone]  F: [phone] [] [facility]  Outpatient Rehabilitation &  Therapy  [address]  [phone]  [phone] [x] [facility]  Outpatient Rehabilitation &  Therapy  [address]   P: [phone]  F: [phone]  [] [facility]  Outpatient Rehabilitation &  Therapy  [address]  P: [phone]  F: [phone] [] [facility]  Outpatient Rehabilitation &  Therapy  [address]  P: [phone]  F: [phone] [] [facility]  Outpatient Rehabilitation &  Therapy  [address]  P: [phone]  F: [phone] [] [facility]  Outpatient Rehabilitation &  Therapy  [address]  P: [phone]  F: [phone]  [] [facility] Outpatient Rehabilitation &  Therapy  [address]  P: [phone]  F: [phone]     Physical Therapy Discharge Note    Date: 2024      Patient: Oli THOMPSON Ai  : 1970  MRN: [id number] Physician: Dr. Rodriguez                                               Insurance: [facility] ( vs)  Medical Diagnosis: Z96.652 (ICD-10-CM) - Presence of left artificial knee joint                  Rehab Codes: M62.81 , R26.89 , M25.562, M25.662 , M25.362   Onset date: 10/1/24                                                   Next Dr's appt.: 24      Visit# / total visits: 10/20 (updated count)                  Cancels/No Shows: 0  Date of initial visit: 10/15/24                Date of final visit: 24      Subjective:

## (undated) DEVICE — SUTURE 2-0 STRATAFIX MONOCRYL 45CM CT-1

## (undated) DEVICE — SUTURE MONOCRYL STRATAFIX SPRL + SZ 2-0 L18IN ABSRB UD CT-1 SXMP1B413

## (undated) DEVICE — STERILE PATIENT PROTECTIVE PAD FOR IMP® KNEE POSITIONERS & COHESIVE WRAP (10 / CASE): Brand: DE MAYO KNEE POSITIONER®

## (undated) DEVICE — DECANTER BAG 9": Brand: MEDLINE INDUSTRIES, INC.

## (undated) DEVICE — ZIMMER® STERILE DISPOSABLE TOURNIQUET CUFF WITH PLC, DUAL PORT, SINGLE BLADDER, 24 IN. (61 CM)

## (undated) DEVICE — 2T11 #2 PDO 36 X 36: Brand: 2T11 #2 PDO 36 X 36

## (undated) DEVICE — 4-PORT MANIFOLD: Brand: NEPTUNE 2

## (undated) DEVICE — BLANKET WRM W29.9XL79.1IN UP BODY FORC AIR MISTRAL-AIR

## (undated) DEVICE — 60 ML SYRINGE,TOOMEY TYPE: Brand: MONOJECT

## (undated) DEVICE — GLOVE SURG 9 PF CRM STRL SENSICARE PI MIC LF

## (undated) DEVICE — 3M™ IOBAN™ 2 ANTIMICROBIAL INCISE DRAPE 6650EZ: Brand: IOBAN™ 2

## (undated) DEVICE — GLOVE ORTHO 7 1/2   MSG9475

## (undated) DEVICE — GOWN,AURORA,NON-REINFORCED,2XL: Brand: MEDLINE

## (undated) DEVICE — SUTURE MONOCRYL SZ 3-0 L27IN ABSRB UD PS-2 3/8 CIR REV CUT NDL MCP427H

## (undated) DEVICE — GOWN,SIRUS,NONRNF,SETINSLV,XL,20/CS: Brand: MEDLINE

## (undated) DEVICE — SUTURE VICRYL ABSRB BRAID COAT UD CP NO 2 27IN  J195H

## (undated) DEVICE — NEEDLE, QUINCKE, 18GX3.5": Brand: MEDLINE

## (undated) DEVICE — GAUZE,SPONGE,FLUFF,6"X6.75",STRL,5/TRAY: Brand: MEDLINE

## (undated) DEVICE — MARKER,SKIN,WI/RULER AND LABELS: Brand: MEDLINE

## (undated) DEVICE — SOLUTION IRRIG 3000ML 0.9% SOD CHL USP UROMATIC PLAS CONT

## (undated) DEVICE — STRYKER PERFORMANCE SERIES SAGITTAL BLADE: Brand: STRYKER PERFORMANCE SERIES

## (undated) DEVICE — ZIPPERED TOGA, 2X LARGE: Brand: FLYTE, SURGICOOL

## (undated) DEVICE — CONTAINER,SPECIMEN,OR STERILE,4OZ: Brand: MEDLINE

## (undated) DEVICE — DRAPE,U/ SHT,SPLIT,PLAS,STERIL: Brand: MEDLINE

## (undated) DEVICE — DRESSING PETRO W3XL8IN OIL EMUL N ADH GZ KNIT IMPREG CELOS

## (undated) DEVICE — BOWL BNE CEM MIX AND SPAT DISP

## (undated) DEVICE — GLOVE SURG SZ 8 L12IN FNGR THK79MIL GRN LTX FREE

## (undated) DEVICE — GARMENT,MEDLINE,DVT,INT,CALF,MED, GEN2: Brand: MEDLINE

## (undated) DEVICE — ADHESIVE SKIN CLOSURE TOP 36 CC HI VISC DERMBND MINI

## (undated) DEVICE — YANKAUER,FLEXIBLE HANDLE,REGLR CAPACITY: Brand: MEDLINE INDUSTRIES, INC.

## (undated) DEVICE — PREP-RESISTANT MARKER W/ RULER: Brand: MEDLINE INDUSTRIES, INC.

## (undated) DEVICE — 2108 SERIES SAGITTAL BLADE FLARED, GROUND  (29.0 X 1.32 X 84.0MM)

## (undated) DEVICE — SPLINT 1524055 DOYLE II AIRWAY SET: Brand: DOYLE II ™

## (undated) DEVICE — COUNTER NDL 40 COUNT HLD 70 FOAM BLK ADH W/ MAG

## (undated) DEVICE — STRIP,CLOSURE,WOUND,MEDI-STRIP,1/2X4: Brand: MEDLINE

## (undated) DEVICE — Device

## (undated) DEVICE — 450 ML BOTTLE OF 0.05% CHLORHEXIDINE GLUCONATE IN 99.95% STERILE WATER FOR IRRIGATION, USP AND APPLICATOR.: Brand: IRRISEPT ANTIMICROBIAL WOUND LAVAGE

## (undated) DEVICE — SYRINGE MED 30ML STD CLR PLAS LUERLOCK TIP N CTRL DISP

## (undated) DEVICE — SUTURE VICRYL SZ 0 L27IN ABSRB UD L36MM CP-1 1/2 CIR REV CUT J267H

## (undated) DEVICE — BLADE SAGITAL 18X90X1.27MM

## (undated) DEVICE — HYPODERMIC SAFETY NEEDLE: Brand: MAGELLAN

## (undated) DEVICE — SUTURE VCRL SZ 0 L27IN ABSRB UD L36MM CP-1 1/2 CIR REV CUT J267H

## (undated) DEVICE — CODMAN® SURGICAL PATTIES 1/2" X 3" (1.27CM X 7.62CM): Brand: CODMAN®

## (undated) DEVICE — SOLUTION IRRIG 500ML 0.9% SOD CHL USP POUR PLAS BTL

## (undated) DEVICE — SOLUTION PREP PEROXIDE HYDROGEN 3% 4 OZ 1

## (undated) DEVICE — SUTURE ABSRB BRAID COAT UD CP NO 2 27IN VCRL J195H

## (undated) DEVICE — GOWN,AURORA,NONRNF,XL,30/CS: Brand: MEDLINE

## (undated) DEVICE — REFLEX ULTRA 45 WITH INTEGRATED CABLE: Brand: COBLATION

## (undated) DEVICE — CEMENT MIXING SYSTEM WITH FEMORAL BREAKWAY NOZZLE: Brand: REVOLUTION

## (undated) DEVICE — MINOR BSIN PK

## (undated) DEVICE — SUTURE MCRYL SZ 3-0 L27IN ABSRB UD PS-2 3/8 CIR REV CUT NDL MCP427H